# Patient Record
Sex: MALE | Race: WHITE | NOT HISPANIC OR LATINO | URBAN - METROPOLITAN AREA
[De-identification: names, ages, dates, MRNs, and addresses within clinical notes are randomized per-mention and may not be internally consistent; named-entity substitution may affect disease eponyms.]

---

## 2017-03-10 ENCOUNTER — INPATIENT (INPATIENT)
Facility: HOSPITAL | Age: 57
LOS: 2 days | Discharge: HOME | DRG: 638 | End: 2017-03-13
Attending: INTERNAL MEDICINE | Admitting: INTERNAL MEDICINE
Payer: COMMERCIAL

## 2017-03-10 VITALS — WEIGHT: 300.05 LBS | OXYGEN SATURATION: 97 % | HEART RATE: 66 BPM | HEIGHT: 69 IN

## 2017-03-10 DIAGNOSIS — E16.2 HYPOGLYCEMIA, UNSPECIFIED: ICD-10-CM

## 2017-03-10 DIAGNOSIS — R07.9 CHEST PAIN, UNSPECIFIED: ICD-10-CM

## 2017-03-10 DIAGNOSIS — I25.10 ATHEROSCLEROTIC HEART DISEASE OF NATIVE CORONARY ARTERY WITHOUT ANGINA PECTORIS: ICD-10-CM

## 2017-03-10 DIAGNOSIS — Z29.9 ENCOUNTER FOR PROPHYLACTIC MEASURES, UNSPECIFIED: ICD-10-CM

## 2017-03-10 LAB
ACETONE SERPL-MCNC: NEGATIVE — SIGNIFICANT CHANGE UP
ALBUMIN SERPL ELPH-MCNC: 3.2 G/DL — LOW (ref 3.5–5)
ALP SERPL-CCNC: 84 U/L — SIGNIFICANT CHANGE UP (ref 40–120)
ALT FLD-CCNC: 28 U/L DA — SIGNIFICANT CHANGE UP (ref 10–60)
ANION GAP SERPL CALC-SCNC: 9 MMOL/L — SIGNIFICANT CHANGE UP (ref 5–17)
APPEARANCE UR: CLEAR — SIGNIFICANT CHANGE UP
APTT BLD: 30.2 SEC — SIGNIFICANT CHANGE UP (ref 27.5–37.4)
AST SERPL-CCNC: 13 U/L — SIGNIFICANT CHANGE UP (ref 10–40)
BACTERIA # UR AUTO: ABNORMAL /HPF
BASOPHILS # BLD AUTO: 0.1 K/UL — SIGNIFICANT CHANGE UP (ref 0–0.2)
BASOPHILS NFR BLD AUTO: 0.8 % — SIGNIFICANT CHANGE UP (ref 0–2)
BILIRUB SERPL-MCNC: 0.3 MG/DL — SIGNIFICANT CHANGE UP (ref 0.2–1.2)
BILIRUB UR-MCNC: NEGATIVE — SIGNIFICANT CHANGE UP
BUN SERPL-MCNC: 10 MG/DL — SIGNIFICANT CHANGE UP (ref 7–18)
CALCIUM SERPL-MCNC: 8.3 MG/DL — LOW (ref 8.4–10.5)
CHLORIDE SERPL-SCNC: 115 MMOL/L — HIGH (ref 96–108)
CHOLEST SERPL-MCNC: 129 MG/DL — SIGNIFICANT CHANGE UP (ref 10–199)
CK MB BLD-MCNC: <2 % — SIGNIFICANT CHANGE UP (ref 0–3.5)
CK MB BLD-MCNC: <2.1 % — SIGNIFICANT CHANGE UP (ref 0–3.5)
CK MB CFR SERPL CALC: <1 NG/ML — SIGNIFICANT CHANGE UP (ref 0–3.6)
CK MB CFR SERPL CALC: <1 NG/ML — SIGNIFICANT CHANGE UP (ref 0–3.6)
CK SERPL-CCNC: 44 U/L — SIGNIFICANT CHANGE UP (ref 35–232)
CK SERPL-CCNC: 47 U/L — SIGNIFICANT CHANGE UP (ref 35–232)
CK SERPL-CCNC: 51 U/L — SIGNIFICANT CHANGE UP (ref 35–232)
CO2 SERPL-SCNC: 21 MMOL/L — LOW (ref 22–31)
COLOR SPEC: YELLOW — SIGNIFICANT CHANGE UP
CREAT SERPL-MCNC: 1.15 MG/DL — SIGNIFICANT CHANGE UP (ref 0.5–1.3)
DIFF PNL FLD: NEGATIVE — SIGNIFICANT CHANGE UP
EOSINOPHIL # BLD AUTO: 0 K/UL — SIGNIFICANT CHANGE UP (ref 0–0.5)
EOSINOPHIL NFR BLD AUTO: 0.2 % — SIGNIFICANT CHANGE UP (ref 0–6)
EPI CELLS # UR: ABNORMAL (ref 0–10)
GLUCOSE SERPL-MCNC: 70 MG/DL — SIGNIFICANT CHANGE UP (ref 70–99)
GLUCOSE UR QL: 1000 MG/DL
HBA1C BLD-MCNC: 7.7 % — HIGH (ref 4–5.6)
HCT VFR BLD CALC: 48.2 % — SIGNIFICANT CHANGE UP (ref 39–50)
HDLC SERPL-MCNC: 30 MG/DL — LOW (ref 40–125)
HGB BLD-MCNC: 15.7 G/DL — SIGNIFICANT CHANGE UP (ref 13–17)
INR BLD: 1.05 RATIO — SIGNIFICANT CHANGE UP (ref 0.88–1.16)
KETONES UR-MCNC: NEGATIVE — SIGNIFICANT CHANGE UP
LACTATE SERPL-SCNC: 1.4 MMOL/L — SIGNIFICANT CHANGE UP (ref 0.7–2)
LEUKOCYTE ESTERASE UR-ACNC: NEGATIVE — SIGNIFICANT CHANGE UP
LIPID PNL WITH DIRECT LDL SERPL: 66 MG/DL — SIGNIFICANT CHANGE UP
LYMPHOCYTES # BLD AUTO: 0.6 K/UL — LOW (ref 1–3.3)
LYMPHOCYTES # BLD AUTO: 5.1 % — LOW (ref 13–44)
MAGNESIUM SERPL-MCNC: 2.3 MG/DL — SIGNIFICANT CHANGE UP (ref 1.8–2.4)
MCHC RBC-ENTMCNC: 29.9 PG — SIGNIFICANT CHANGE UP (ref 27–34)
MCHC RBC-ENTMCNC: 32.6 GM/DL — SIGNIFICANT CHANGE UP (ref 32–36)
MCV RBC AUTO: 91.8 FL — SIGNIFICANT CHANGE UP (ref 80–100)
MONOCYTES # BLD AUTO: 1 K/UL — HIGH (ref 0–0.9)
MONOCYTES NFR BLD AUTO: 7.7 % — SIGNIFICANT CHANGE UP (ref 2–14)
NEUTROPHILS # BLD AUTO: 10.8 K/UL — HIGH (ref 1.8–7.4)
NEUTROPHILS NFR BLD AUTO: 86.2 % — HIGH (ref 43–77)
NITRITE UR-MCNC: NEGATIVE — SIGNIFICANT CHANGE UP
NT-PROBNP SERPL-SCNC: 290 PG/ML — HIGH (ref 0–125)
PH UR: 5 — SIGNIFICANT CHANGE UP (ref 4.8–8)
PLATELET # BLD AUTO: 178 K/UL — SIGNIFICANT CHANGE UP (ref 150–400)
POTASSIUM SERPL-MCNC: 3.5 MMOL/L — SIGNIFICANT CHANGE UP (ref 3.5–5.3)
POTASSIUM SERPL-SCNC: 3.5 MMOL/L — SIGNIFICANT CHANGE UP (ref 3.5–5.3)
PROT SERPL-MCNC: 7.1 G/DL — SIGNIFICANT CHANGE UP (ref 6–8.3)
PROT UR-MCNC: 15
PROTHROM AB SERPL-ACNC: 11.8 SEC — SIGNIFICANT CHANGE UP (ref 10–13.1)
RBC # BLD: 5.25 M/UL — SIGNIFICANT CHANGE UP (ref 4.2–5.8)
RBC # FLD: 13.8 % — SIGNIFICANT CHANGE UP (ref 10.3–14.5)
RBC CASTS # UR COMP ASSIST: SIGNIFICANT CHANGE UP /HPF (ref 0–2)
SODIUM SERPL-SCNC: 145 MMOL/L — SIGNIFICANT CHANGE UP (ref 135–145)
SP GR SPEC: 1.02 — SIGNIFICANT CHANGE UP (ref 1.01–1.02)
TOTAL CHOLESTEROL/HDL RATIO MEASUREMENT: 4.3 RATIO — SIGNIFICANT CHANGE UP (ref 3.4–9.6)
TRIGL SERPL-MCNC: 164 MG/DL — HIGH (ref 10–149)
TROPONIN I SERPL-MCNC: <0.015 NG/ML — SIGNIFICANT CHANGE UP (ref 0–0.04)
UROBILINOGEN FLD QL: NEGATIVE — SIGNIFICANT CHANGE UP
WBC # BLD: 12.5 K/UL — HIGH (ref 3.8–10.5)
WBC # FLD AUTO: 12.5 K/UL — HIGH (ref 3.8–10.5)
WBC UR QL: SIGNIFICANT CHANGE UP /HPF (ref 0–5)

## 2017-03-10 PROCEDURE — 99285 EMERGENCY DEPT VISIT HI MDM: CPT

## 2017-03-10 PROCEDURE — 71010: CPT | Mod: 26

## 2017-03-10 RX ORDER — TOPIRAMATE 25 MG
50 TABLET ORAL
Qty: 0 | Refills: 0 | Status: DISCONTINUED | OUTPATIENT
Start: 2017-03-10 | End: 2017-03-13

## 2017-03-10 RX ORDER — SODIUM CHLORIDE 9 MG/ML
1000 INJECTION, SOLUTION INTRAVENOUS
Qty: 0 | Refills: 0 | Status: DISCONTINUED | OUTPATIENT
Start: 2017-03-10 | End: 2017-03-10

## 2017-03-10 RX ORDER — ASPIRIN/CALCIUM CARB/MAGNESIUM 324 MG
81 TABLET ORAL DAILY
Qty: 0 | Refills: 0 | Status: DISCONTINUED | OUTPATIENT
Start: 2017-03-10 | End: 2017-03-13

## 2017-03-10 RX ORDER — LOSARTAN POTASSIUM 100 MG/1
25 TABLET, FILM COATED ORAL DAILY
Qty: 0 | Refills: 0 | Status: DISCONTINUED | OUTPATIENT
Start: 2017-03-10 | End: 2017-03-13

## 2017-03-10 RX ORDER — INSULIN LISPRO 100/ML
2 VIAL (ML) SUBCUTANEOUS ONCE
Qty: 0 | Refills: 0 | Status: COMPLETED | OUTPATIENT
Start: 2017-03-10 | End: 2017-03-10

## 2017-03-10 RX ORDER — INSULIN LISPRO 100/ML
VIAL (ML) SUBCUTANEOUS
Qty: 0 | Refills: 0 | Status: DISCONTINUED | OUTPATIENT
Start: 2017-03-10 | End: 2017-03-13

## 2017-03-10 RX ORDER — IPRATROPIUM/ALBUTEROL SULFATE 18-103MCG
3 AEROSOL WITH ADAPTER (GRAM) INHALATION EVERY 6 HOURS
Qty: 0 | Refills: 0 | Status: DISCONTINUED | OUTPATIENT
Start: 2017-03-10 | End: 2017-03-12

## 2017-03-10 RX ORDER — CLOPIDOGREL BISULFATE 75 MG/1
75 TABLET, FILM COATED ORAL EVERY 24 HOURS
Qty: 0 | Refills: 0 | Status: DISCONTINUED | OUTPATIENT
Start: 2017-03-10 | End: 2017-03-13

## 2017-03-10 RX ORDER — DEXTROSE 50 % IN WATER 50 %
25 SYRINGE (ML) INTRAVENOUS ONCE
Qty: 0 | Refills: 0 | Status: DISCONTINUED | OUTPATIENT
Start: 2017-03-10 | End: 2017-03-13

## 2017-03-10 RX ORDER — INFLUENZA VIRUS VACCINE 15; 15; 15; 15 UG/.5ML; UG/.5ML; UG/.5ML; UG/.5ML
0.5 SUSPENSION INTRAMUSCULAR ONCE
Qty: 0 | Refills: 0 | Status: COMPLETED | OUTPATIENT
Start: 2017-03-10 | End: 2017-03-13

## 2017-03-10 RX ORDER — OXCARBAZEPINE 300 MG/1
600 TABLET, FILM COATED ORAL DAILY
Qty: 0 | Refills: 0 | Status: DISCONTINUED | OUTPATIENT
Start: 2017-03-10 | End: 2017-03-13

## 2017-03-10 RX ORDER — DEXTROSE 50 % IN WATER 50 %
50 SYRINGE (ML) INTRAVENOUS ONCE
Qty: 0 | Refills: 0 | Status: COMPLETED | OUTPATIENT
Start: 2017-03-10 | End: 2017-03-10

## 2017-03-10 RX ORDER — FUROSEMIDE 40 MG
40 TABLET ORAL DAILY
Qty: 0 | Refills: 0 | Status: DISCONTINUED | OUTPATIENT
Start: 2017-03-10 | End: 2017-03-13

## 2017-03-10 RX ORDER — SPIRONOLACTONE 25 MG/1
25 TABLET, FILM COATED ORAL DAILY
Qty: 0 | Refills: 0 | Status: DISCONTINUED | OUTPATIENT
Start: 2017-03-10 | End: 2017-03-13

## 2017-03-10 RX ORDER — RANOLAZINE 500 MG/1
500 TABLET, FILM COATED, EXTENDED RELEASE ORAL
Qty: 0 | Refills: 0 | Status: DISCONTINUED | OUTPATIENT
Start: 2017-03-10 | End: 2017-03-13

## 2017-03-10 RX ORDER — CEFTRIAXONE 500 MG/1
1 INJECTION, POWDER, FOR SOLUTION INTRAMUSCULAR; INTRAVENOUS ONCE
Qty: 0 | Refills: 0 | Status: COMPLETED | OUTPATIENT
Start: 2017-03-10 | End: 2017-03-10

## 2017-03-10 RX ORDER — DEXTROSE 50 % IN WATER 50 %
100 SYRINGE (ML) INTRAVENOUS ONCE
Qty: 0 | Refills: 0 | Status: COMPLETED | OUTPATIENT
Start: 2017-03-10 | End: 2017-03-10

## 2017-03-10 RX ORDER — AZITHROMYCIN 500 MG/1
500 TABLET, FILM COATED ORAL ONCE
Qty: 0 | Refills: 0 | Status: COMPLETED | OUTPATIENT
Start: 2017-03-10 | End: 2017-03-10

## 2017-03-10 RX ORDER — METOPROLOL TARTRATE 50 MG
25 TABLET ORAL DAILY
Qty: 0 | Refills: 0 | Status: DISCONTINUED | OUTPATIENT
Start: 2017-03-10 | End: 2017-03-13

## 2017-03-10 RX ORDER — ENOXAPARIN SODIUM 100 MG/ML
40 INJECTION SUBCUTANEOUS DAILY
Qty: 0 | Refills: 0 | Status: DISCONTINUED | OUTPATIENT
Start: 2017-03-10 | End: 2017-03-13

## 2017-03-10 RX ORDER — FLUOXETINE HCL 10 MG
40 CAPSULE ORAL DAILY
Qty: 0 | Refills: 0 | Status: DISCONTINUED | OUTPATIENT
Start: 2017-03-10 | End: 2017-03-13

## 2017-03-10 RX ORDER — ATORVASTATIN CALCIUM 80 MG/1
10 TABLET, FILM COATED ORAL AT BEDTIME
Qty: 0 | Refills: 0 | Status: DISCONTINUED | OUTPATIENT
Start: 2017-03-10 | End: 2017-03-13

## 2017-03-10 RX ORDER — SODIUM CHLORIDE 9 MG/ML
1000 INJECTION, SOLUTION INTRAVENOUS
Qty: 0 | Refills: 0 | Status: DISCONTINUED | OUTPATIENT
Start: 2017-03-10 | End: 2017-03-13

## 2017-03-10 RX ADMIN — Medication 100 MILLILITER(S): at 12:12

## 2017-03-10 RX ADMIN — SODIUM CHLORIDE 50 MILLILITER(S): 9 INJECTION, SOLUTION INTRAVENOUS at 22:36

## 2017-03-10 RX ADMIN — Medication 50 MILLILITER(S): at 11:00

## 2017-03-10 RX ADMIN — OXCARBAZEPINE 600 MILLIGRAM(S): 300 TABLET, FILM COATED ORAL at 22:49

## 2017-03-10 RX ADMIN — ATORVASTATIN CALCIUM 10 MILLIGRAM(S): 80 TABLET, FILM COATED ORAL at 22:50

## 2017-03-10 RX ADMIN — SODIUM CHLORIDE 30 MILLILITER(S): 9 INJECTION, SOLUTION INTRAVENOUS at 14:03

## 2017-03-10 RX ADMIN — AZITHROMYCIN 250 MILLIGRAM(S): 500 TABLET, FILM COATED ORAL at 14:46

## 2017-03-10 RX ADMIN — Medication 40 MILLIGRAM(S): at 22:48

## 2017-03-10 RX ADMIN — Medication 3 MILLILITER(S): at 22:46

## 2017-03-10 RX ADMIN — CEFTRIAXONE 100 GRAM(S): 500 INJECTION, POWDER, FOR SOLUTION INTRAMUSCULAR; INTRAVENOUS at 14:03

## 2017-03-10 NOTE — ED PROVIDER NOTE - NS ED MD SCRIBE ATTENDING SCRIBE SECTIONS
PHYSICAL EXAM/HISTORY OF PRESENT ILLNESS/PAST MEDICAL/SURGICAL/SOCIAL HISTORY/DISPOSITION/HIV/VITAL SIGNS( Pullset)/REVIEW OF SYSTEMS

## 2017-03-10 NOTE — H&P ADULT. - PROBLEM SELECTOR PLAN 2
Likely stable angina   Continue with aspirin plavix and ranexa   Pt is also on lasix and spironolactone for peripheral edema as per wife , no heart failure   Trend cardiac enzyme  EKG showed no signs of ischemia   Monitor on Tele   Cardio Dr. Aguiar ( Primary cardiologist)

## 2017-03-10 NOTE — H&P ADULT. - PROBLEM SELECTOR PLAN 1
Off insulin pump and oral agent ( invokana and metformin)   Continue with Dextrose 5 @ 50 cc/hr   Fingerstick Q1 hour   If FS drops <70 repeat serum glucose (BMP ) and insulin and c-peptide level (at the time of drop FS)  Endo Dr. Aguiar following  Regular diet with evening snack

## 2017-03-10 NOTE — ED PROVIDER NOTE - CRITICAL CARE PROVIDED
direct patient care (not related to procedure)/interpretation of diagnostic studies/documentation/consultation with other physicians/additional history taking

## 2017-03-10 NOTE — H&P ADULT. - ASSESSMENT
56 M from home lives with wife , PMH of CAD with stent x1( august 2016), HTN, DM , bipolar disorder presented to ED with hypoglycemia. As per patient and wife at bedside patient had episode of low blood sugar , he was brought to [facility] and was discharged on Wednesday, last night Patient was not feeling well , FS was checked and it was in 300's , he went to bed , wife went to wake him up in the morning but he was lethargic and drowsy and FS was checked and it was 20 , Coke was given to patient , repeated 35 , EMS was called , D50 was   given by the EMS , wife also reported shaking and sweaty , patient reported feeling blacked out.  Patient reported being off insulin pump and PO invokana and metformin completely since last admission. He had episode of hypoglycemia 3-4 years ago and since then no further episodes except this last week. Last HbA1c was 7.5 as per wife.  In ED patient FS found to be 55 D50 was given , repeated in one hour 36, another D50 was  given , repeated 107 . Started on D5 .   Discussed with Endocrinologist Dr. Aguiar will send serum insulin level, c-peptide and sulfonylurea level, if FS drops repeat serum glucose , serum insulin and c-peptide level.

## 2017-03-10 NOTE — ED PROVIDER NOTE - PROGRESS NOTE DETAILS
Pt w/ persistent hypoglycemia. Given total three amps D50, now blood sugar 106. Will discuss w/ Dr. Aguiar, endocrinologist. Will admit pt, etiology for hypoglycemia unknown since pt has not been using insulin for past few days. Pt w/ persistent hypoglycemia. Given total three amps D50, now blood sugar 106. Will discuss w/ Dr. Aguiar, endocrinologist. Will admit pt, etiology for hypoglycemia unknown since pt has not been using insulin for past few days. case d/w Dr. Cote

## 2017-03-10 NOTE — ED PROVIDER NOTE - MEDICAL DECISION MAKING DETAILS
Pt w/ persistent hypoglycemia. Concern for compromised renal function. Will get labs. Also c/o L chest pain. Unlikely cardiac, however, given Hx of CAD w/ stent will get trops and admission.

## 2017-03-10 NOTE — ED ADULT NURSE NOTE - CHIEF COMPLAINT QUOTE
C/O LOW BLD SUGAR IN THE FIELD THIS AM. EMS REPORTS PT F/S IN THE FIELD WAS 20 1 AMP D50 IVP GIVEN; THEN F/S WAS 97 1 AMP D50 GIVEN; F/S AFTER . EMS REPORTS THAT PT C/O CHEST PRESSURE IN THE FIELD  MG PO AND NITRO 1 SL GIVEN

## 2017-03-10 NOTE — H&P ADULT. - ATTENDING COMMENTS
56 M from home lives with wife , PMH of CAD with stent x1( august 2016), HTN, DM , bipolar disorder presented to ED with hypoglycemia. As per patient and wife at bedside patient had episode of low blood sugar , he was brought to [facility] and was discharged on Wednesday, last night Patient was not feeling well , FS was checked and it was in 300's , he went to bed , wife went to wake him up in the morning but he was lethargic and drowsy and FS was checked and it was 20 , Coke was given to patient , repeated 35 , EMS was called , D50 was   given by the EMS , wife also reported shaking and sweaty , patient reported feeling blacked out.  Patient reported being off insulin pump and PO invokana and metformin completely since last admission. He had episode of hypoglycemia 3-4 years ago and since then no further episodes except this last week. Last HbA1c was 7.5 as per wife.  He also c/o persistent left sided chest pain , non exertional , no radiation with occasional SOB.  ROS negative for fever, chills, headache, abdominal pain, constipation , diarrhea, urinary pain or burning.    pt seen in bed, a+o x3, nad, vitals stable except hypoglycemia, physical exam reveals no focal motor deficit, clear lungs, regular s1s2, abd soft nd nt bs+, ext no edema. labs and diagnostic test result reviewed.    assessment   --- chest pain,  r/o acs, hypoglycemia, r/o bacteremia, h/o CAD with stent x1( august 2016), HTN, DM , bipolar disorder, alejandra     plan  --  adm to tele, acs protocol, lopressor, aspirin, statin, cont preadmit home meds, gi and dvt profilaxis  cbc, bmp, mg, phos, lipid, tsh, ce q8 x3    echo    cardio cons  endo cons  pulm cons 56 M from home lives with wife , PMH of CAD with stent x1( august 2016), HTN, DM , bipolar disorder presented to ED with hypoglycemia. As per patient and wife at bedside patient had episode of low blood sugar , he was brought to [facility] and was discharged on Wednesday, last night Patient was not feeling well , FS was checked and it was in 300's , he went to bed , wife went to wake him up in the morning but he was lethargic and drowsy and FS was checked and it was 20 , Coke was given to patient , repeated 35 , EMS was called , D50 was   given by the EMS , wife also reported shaking and sweaty , patient reported feeling blacked out.  Patient reported being off insulin pump and PO invokana and metformin completely since last admission. He had episode of hypoglycemia 3-4 years ago and since then no further episodes except this last week. Last HbA1c was 7.5 as per wife.  He also c/o persistent left sided chest pain , non exertional , no radiation with occasional SOB.  ROS negative for fever, chills, headache, abdominal pain, constipation , diarrhea, urinary pain or burning.    pt seen in bed, a+o x3, nad, vitals stable except hypoglycemia, physical exam reveals no focal motor deficit, clear lungs, regular s1s2, abd soft nd nt bs+, ext no edema. labs and diagnostic test result reviewed.    assessment   --- chest pain,  r/o acs, hypoglycemia, r/o bacteremia, h/o CAD with stent x1( august 2016), HTN, DM , bipolar disorder, alejandra     plan  --  adm to tele, acs protocol, lopressor, aspirin, statin, cont preadmit home meds, gi and dvt profilaxis  cbc, bmp, mg, phos, lipid, tsh, ce q8 x3, insulin lev, cpeptide, sulfonylureas  lev    echo    cardio cons  endo cons  pulm cons

## 2017-03-10 NOTE — ED PROVIDER NOTE - PMH
Abdominal Obesity    Arthritis    CAD (coronary artery disease)    Congestive heart failure    Diabetes Mellitus Type 2 in Obese    JONAH treated with BiPAP    Smoker

## 2017-03-10 NOTE — ED ADULT TRIAGE NOTE - CHIEF COMPLAINT QUOTE
C/O LOW BLD SUGAR IN THE FIELD THIS AM. EMS REPORTS PT F/S IN THE FIELD WAS 20 1 AMP D50 IVP GIVEN; THEN F/F WAS 97 1 AMP D50 GIVEN; F/S AFTER . EMS REPORTS THAT PT C/O CHEST PRESSURE IN THE FIELD  MG PO AND NITRO 1 SL GIVEN C/O LOW BLD SUGAR IN THE FIELD THIS AM. EMS REPORTS PT F/S IN THE FIELD WAS 20 1 AMP D50 IVP GIVEN; THEN F/S WAS 97 1 AMP D50 GIVEN; F/S AFTER . EMS REPORTS THAT PT C/O CHEST PRESSURE IN THE FIELD  MG PO AND NITRO 1 SL GIVEN

## 2017-03-10 NOTE — H&P ADULT. - HISTORY OF PRESENT ILLNESS
56 M from home lives with wife , PMH of CAD with stent x1( august 2016), HTN, DM , bipolar disorder presented to ED with hypoglycemia. As per patient and wife at bedside patient had episode of low blood sugar , he was brought to [facility] and was discharged on Wednesday, last night Patient was not feeling well , FS was checked and it was in 300's , he went to bed , wife went to wake him up in the morning but he was lethargic and drowsy and FS was checked and it was 20 , Coke was given to patient , repeated 35 , EMS was called , D50 was   given by the EMS , wife also reported shaking and sweaty , patient reported feeling blacked out.  He also c/o persistent left sided chest pain , non exertional , no radiation with occasional SOB. Patient reported being off insulin pump and PO invokana and metformin completely since last admission. He had episode of hypoglycemia 3-4 years ago and since then no further episodes except this last week. 56 M from home lives with wife , PMH of CAD with stent x1( august 2016), HTN, DM , bipolar disorder presented to ED with hypoglycemia. As per patient and wife at bedside patient had episode of low blood sugar , he was brought to [facility] and was discharged on Wednesday, last night Patient was not feeling well , FS was checked and it was in 300's , he went to bed , wife went to wake him up in the morning but he was lethargic and drowsy and FS was checked and it was 20 , Coke was given to patient , repeated 35 , EMS was called , D50 was   given by the EMS , wife also reported shaking and sweaty , patient reported feeling blacked out.  Patient reported being off insulin pump and PO invokana and metformin completely since last admission. He had episode of hypoglycemia 3-4 years ago and since then no further episodes except this last week. Last HbA1c was 7.5 as per wife.  He also c/o persistent left sided chest pain , non exertional , no radiation with occasional SOB.  ROS negative for fever, chills, headache, abdominal pain, constipation , diarrhea, urinary pain or burning.

## 2017-03-10 NOTE — ED PROVIDER NOTE - OBJECTIVE STATEMENT
56 M w/ PMHx of arthritis, bipolar disorder, CAD, DM, on insulin last dosage 5 days ago, and JONAH BIBA p/w hypoglycemia onset today. As per wife, pt's sugar 20 at home and EMS provided 1 amp D 50 and blood sugar went to 97. After pt was given nitroglycerin for L chest pain and 162 ASA but complains no relief.  Wife reports pt has been off insulin for 5 days after being in hospital. Wife states pt went to [facility] 5 days ago for low sugar and was in hospital and was d/c 2 days ago w/ decreased appetite. Pt also notes R sided chest pain described as tightness x 2 weeks. Wife denies vomiting, coughing, shortness of breath, urinary Sx, or any other complaint. PMD: Dr. Coto; Cardiologist: Dr. Aguiar. Pt is a former smoker and quit this month. 56 M w/ PMHx of arthritis, bipolar disorder, CAD, DM, on insulin last dosage 5 days ago, and JONAH BIBA p/w hypoglycemia onset today. As per wife, pt's sugar 20 at home and EMS provided 1 amp D 50 and blood sugar went to 97. After pt was given nitroglycerin for L chest pain and 162 ASA but complains no relief.  Wife reports pt has been off insulin for 5 days after being in hospital. Wife states pt went to [facility] 5 days ago for low sugar and was in hospital and was d/c 2 days ago w/ decreased appetite. Pt also notes R sided chest pain described as tightness x 2 weeks. Wife denies vomiting, coughing, shortness of breath, urinary Sx, or any other complaint. PMD: Dr. Coto; Endocrinologist: Dr. Aguiar, affiliated w/ [place]. Pt is a former smoker for 40 years and quit this month. 56 M w/ PMHx of arthritis, bipolar disorder, CAD, DM, on insulin last dosage 5 days ago, and JONAH BIBA p/w hypoglycemia onset today. As per wife, pt's sugar 20 at home, given coca cola and EMS provided 1 amp D 50 and blood sugar went to 97. After pt was given nitroglycerin for L chest pain and 162 ASA but complains no relief.  Wife reports pt has been off insulin for 5 days after being in hospital. Wife states pt went to [facility] 5 days ago for low sugar and was in hospital and was d/c 2 days ago w/ decreased appetite. Pt also notes R sided chest pain described as tightness x 2 weeks. Wife denies vomiting, coughing, shortness of breath, urinary Sx, or any other complaint. PMD: Dr. Coto; Endocrinologist: Dr. Aguiar, affiliated w/ [place]. Pt is a former smoker for 40 years and quit this month.

## 2017-03-10 NOTE — ED ADULT NURSE REASSESSMENT NOTE - NS ED NURSE REASSESS COMMENT FT1
FS 36 MD butts informed, Med given as ordered, patient alert and responsive. A and O X 3. Family at bedside.

## 2017-03-11 ENCOUNTER — TRANSCRIPTION ENCOUNTER (OUTPATIENT)
Age: 57
End: 2017-03-11

## 2017-03-11 LAB
ANION GAP SERPL CALC-SCNC: 9 MMOL/L — SIGNIFICANT CHANGE UP (ref 5–17)
BUN SERPL-MCNC: 10 MG/DL — SIGNIFICANT CHANGE UP (ref 7–18)
C PEPTIDE SERPL-MCNC: 7.4 NG/ML — HIGH (ref 0.9–7.1)
CALCIUM SERPL-MCNC: 8.2 MG/DL — LOW (ref 8.4–10.5)
CHLORIDE SERPL-SCNC: 108 MMOL/L — SIGNIFICANT CHANGE UP (ref 96–108)
CO2 SERPL-SCNC: 23 MMOL/L — SIGNIFICANT CHANGE UP (ref 22–31)
CREAT SERPL-MCNC: 1.27 MG/DL — SIGNIFICANT CHANGE UP (ref 0.5–1.3)
CULTURE RESULTS: NO GROWTH — SIGNIFICANT CHANGE UP
GLUCOSE SERPL-MCNC: 193 MG/DL — HIGH (ref 70–99)
GRAM STN FLD: SIGNIFICANT CHANGE UP
HCT VFR BLD CALC: 47.1 % — SIGNIFICANT CHANGE UP (ref 39–50)
HGB BLD-MCNC: 14.7 G/DL — SIGNIFICANT CHANGE UP (ref 13–17)
INSULIN SERPL-MCNC: 41 UU/ML — HIGH (ref 3–17)
MAGNESIUM SERPL-MCNC: 2.3 MG/DL — SIGNIFICANT CHANGE UP (ref 1.8–2.4)
MCHC RBC-ENTMCNC: 29.6 PG — SIGNIFICANT CHANGE UP (ref 27–34)
MCHC RBC-ENTMCNC: 31.3 GM/DL — LOW (ref 32–36)
MCV RBC AUTO: 94.4 FL — SIGNIFICANT CHANGE UP (ref 80–100)
PHOSPHATE SERPL-MCNC: 1.7 MG/DL — LOW (ref 2.5–4.5)
PLATELET # BLD AUTO: 166 K/UL — SIGNIFICANT CHANGE UP (ref 150–400)
POTASSIUM SERPL-MCNC: 3.9 MMOL/L — SIGNIFICANT CHANGE UP (ref 3.5–5.3)
POTASSIUM SERPL-SCNC: 3.9 MMOL/L — SIGNIFICANT CHANGE UP (ref 3.5–5.3)
RBC # BLD: 4.99 M/UL — SIGNIFICANT CHANGE UP (ref 4.2–5.8)
RBC # FLD: 13.8 % — SIGNIFICANT CHANGE UP (ref 10.3–14.5)
SODIUM SERPL-SCNC: 140 MMOL/L — SIGNIFICANT CHANGE UP (ref 135–145)
SPECIMEN SOURCE: SIGNIFICANT CHANGE UP
SPECIMEN SOURCE: SIGNIFICANT CHANGE UP
WBC # BLD: 8.8 K/UL — SIGNIFICANT CHANGE UP (ref 3.8–10.5)
WBC # FLD AUTO: 8.8 K/UL — SIGNIFICANT CHANGE UP (ref 3.8–10.5)

## 2017-03-11 RX ORDER — SODIUM,POTASSIUM PHOSPHATES 278-250MG
1 POWDER IN PACKET (EA) ORAL
Qty: 0 | Refills: 0 | Status: COMPLETED | OUTPATIENT
Start: 2017-03-11 | End: 2017-03-12

## 2017-03-11 RX ORDER — DEXTROSE 50 % IN WATER 50 %
50 SYRINGE (ML) INTRAVENOUS EVERY 6 HOURS
Qty: 0 | Refills: 0 | Status: DISCONTINUED | OUTPATIENT
Start: 2017-03-11 | End: 2017-03-13

## 2017-03-11 RX ADMIN — OXCARBAZEPINE 600 MILLIGRAM(S): 300 TABLET, FILM COATED ORAL at 12:44

## 2017-03-11 RX ADMIN — Medication 3 MILLILITER(S): at 02:28

## 2017-03-11 RX ADMIN — Medication 50 MILLIGRAM(S): at 05:56

## 2017-03-11 RX ADMIN — RANOLAZINE 500 MILLIGRAM(S): 500 TABLET, FILM COATED, EXTENDED RELEASE ORAL at 02:04

## 2017-03-11 RX ADMIN — SPIRONOLACTONE 25 MILLIGRAM(S): 25 TABLET, FILM COATED ORAL at 05:56

## 2017-03-11 RX ADMIN — ATORVASTATIN CALCIUM 10 MILLIGRAM(S): 80 TABLET, FILM COATED ORAL at 22:32

## 2017-03-11 RX ADMIN — SODIUM CHLORIDE 50 MILLILITER(S): 9 INJECTION, SOLUTION INTRAVENOUS at 05:56

## 2017-03-11 RX ADMIN — RANOLAZINE 500 MILLIGRAM(S): 500 TABLET, FILM COATED, EXTENDED RELEASE ORAL at 18:30

## 2017-03-11 RX ADMIN — Medication 81 MILLIGRAM(S): at 12:11

## 2017-03-11 RX ADMIN — Medication 40 MILLIGRAM(S): at 05:55

## 2017-03-11 RX ADMIN — RANOLAZINE 500 MILLIGRAM(S): 500 TABLET, FILM COATED, EXTENDED RELEASE ORAL at 11:07

## 2017-03-11 RX ADMIN — Medication 1 TABLET(S): at 22:32

## 2017-03-11 RX ADMIN — ENOXAPARIN SODIUM 40 MILLIGRAM(S): 100 INJECTION SUBCUTANEOUS at 12:11

## 2017-03-11 RX ADMIN — Medication 25 MILLIGRAM(S): at 05:55

## 2017-03-11 RX ADMIN — Medication 1 TABLET(S): at 12:44

## 2017-03-11 RX ADMIN — Medication 50 MILLIGRAM(S): at 02:03

## 2017-03-11 RX ADMIN — CLOPIDOGREL BISULFATE 75 MILLIGRAM(S): 75 TABLET, FILM COATED ORAL at 18:30

## 2017-03-11 RX ADMIN — Medication 3 MILLILITER(S): at 20:11

## 2017-03-11 RX ADMIN — Medication 1 TABLET(S): at 18:30

## 2017-03-11 RX ADMIN — Medication 50 MILLIGRAM(S): at 18:30

## 2017-03-11 RX ADMIN — Medication 40 MILLIGRAM(S): at 12:44

## 2017-03-11 RX ADMIN — LOSARTAN POTASSIUM 25 MILLIGRAM(S): 100 TABLET, FILM COATED ORAL at 05:55

## 2017-03-11 RX ADMIN — Medication 3 MILLILITER(S): at 08:13

## 2017-03-11 RX ADMIN — Medication 3 MILLILITER(S): at 14:35

## 2017-03-11 NOTE — DISCHARGE NOTE ADULT - MEDICATION SUMMARY - MEDICATIONS TO STOP TAKING
I will STOP taking the medications listed below when I get home from the hospital:    Invokana 300 mg oral tablet  -- 1 tab(s) by mouth once a day

## 2017-03-11 NOTE — DISCHARGE NOTE ADULT - MEDICATION SUMMARY - MEDICATIONS TO CHANGE
I will SWITCH the dose or number of times a day I take the medications listed below when I get home from the hospital:    metFORMIN 1000 mg oral tablet  -- 1 tab(s) by mouth 2 times a day

## 2017-03-11 NOTE — DISCHARGE NOTE ADULT - NS AS DC HF EDUCATION INSTRUCTIONS
Low salt diet/Report weight gain of 2 or more pounds in one day or 3 or more pounds in one week, worsening shortness of breath, fatigue, weakness, increased swelling of hands and feet to primary care provider/Call Primary Care Provider for follow-up after discharge/Activities as tolerated/Monitor Weight Daily

## 2017-03-11 NOTE — DISCHARGE NOTE ADULT - PLAN OF CARE
Stop all kind of insulin and continue metformin. Follow up with primary cardiologist and endocrinologist within week of discharge. Continue Aspirin, plavix and all the cardiac medication. Follow up with cardiologist within a week of discharge. We recommend repeat cardiac cath but patient refused to be transferred. We recommend to follow up with cardiologist and consider cardiac cath for possible unstable angina. Exercise 3-5 times a week 30 minutes a day. Weight loss will help control sugars and BP.

## 2017-03-11 NOTE — DISCHARGE NOTE ADULT - CARE PLAN
Principal Discharge DX:	Hypoglycemia  Goal:	Stop all kind of insulin and continue metformin.  Instructions for follow-up, activity and diet:	Follow up with primary cardiologist and endocrinologist within week of discharge.  Secondary Diagnosis:	CAD (coronary artery disease)  Instructions for follow-up, activity and diet:	Continue Aspirin, plavix and all the cardiac medication. Follow up with cardiologist within a week of discharge. We recommend repeat cardiac cath but patient refused to be transferred. We recommend to follow up with cardiologist and consider cardiac cath for possible unstable angina.  Secondary Diagnosis:	Abdominal obesity  Instructions for follow-up, activity and diet:	Exercise 3-5 times a week 30 minutes a day. Weight loss will help control sugars and BP.

## 2017-03-11 NOTE — DISCHARGE NOTE ADULT - MEDICATION SUMMARY - MEDICATIONS TO TAKE
I will START or STAY ON the medications listed below when I get home from the hospital:    spironolactone 25 mg oral tablet  -- 1 tab(s) by mouth once a day  -- Indication: For Htn    Aspirin Enteric Coated 81 mg oral delayed release tablet  -- 1 tab(s) by mouth once a day  -- Indication: For Ascvd prevention    Cozaar 25 mg oral tablet  -- 1 tab(s) by mouth once a day  -- Indication: For Htn    Ranexa 500 mg oral tablet, extended release  -- 1 tab(s) by mouth 2 times a day  -- Indication: For Anti angina    isosorbide mononitrate 30 mg oral tablet, extended release  -- 1 tab(s) by mouth once a day  -- Indication: For Anti anginal    Topamax 50 mg oral tablet  -- 1 tab(s) by mouth 2 times a day  -- Indication: For Anti conculsant    Trileptal 600 mg oral tablet  -- 1 tab(s) by mouth once a day  -- Indication: For ANticonvulsant    PROzac 40 mg oral capsule  -- 1 cap(s) by mouth once a day  -- Indication: For Anti depressant    Lipitor 10 mg oral tablet  -- 1 tab(s) by mouth once a day  -- Indication: For HLD    clopidogrel 75 mg oral tablet  -- 1 tab(s) by mouth every 24 hours  -- Indication: For CAD (coronary artery disease)    Lopressor  -- 25 milligram(s) by mouth once a day  -- Indication: For Htn    albuterol 90 mcg/inh inhalation powder  -- 2 puff(s) inhaled every 4 hours  -- Indication: For reactive airway disease    Lasix 40 mg oral tablet  -- 1 tab(s) by mouth once a day  -- Indication: For Peripheral swelling I will START or STAY ON the medications listed below when I get home from the hospital:    spironolactone 25 mg oral tablet  -- 1 tab(s) by mouth once a day  -- Indication: For Htn    Aspirin Enteric Coated 81 mg oral delayed release tablet  -- 1 tab(s) by mouth once a day  -- Indication: For Ascvd prevention    Cozaar 25 mg oral tablet  -- 1 tab(s) by mouth once a day  -- Indication: For Htn    Ranexa 500 mg oral tablet, extended release  -- 1 tab(s) by mouth 2 times a day  -- Indication: For Anti angina    isosorbide mononitrate 30 mg oral tablet, extended release  -- 1 tab(s) by mouth once a day  -- Indication: For Anti anginal    Topamax 50 mg oral tablet  -- 1 tab(s) by mouth 2 times a day  -- Indication: For Anti conculsant    Trileptal 600 mg oral tablet  -- 1 tab(s) by mouth once a day  -- Indication: For ANticonvulsant    PROzac 40 mg oral capsule  -- 1 cap(s) by mouth once a day  -- Indication: For Anti depressant    metFORMIN 500 mg oral tablet  -- 1 tab(s) by mouth 2 times a day  -- Check with your doctor before becoming pregnant.  Do not drink alcoholic beverages when taking this medication.  It is very important that you take or use this exactly as directed.  Do not skip doses or discontinue unless directed by your doctor.  Obtain medical advice before taking any non-prescription drugs as some may affect the action of this medication.  Take with food or milk.    -- Indication: For Diabetes mellitus    Lipitor 10 mg oral tablet  -- 1 tab(s) by mouth once a day  -- Indication: For HLD    clopidogrel 75 mg oral tablet  -- 1 tab(s) by mouth every 24 hours  -- Indication: For CAD (coronary artery disease)    Lopressor  -- 25 milligram(s) by mouth once a day  -- Indication: For Htn    albuterol 90 mcg/inh inhalation powder  -- 2 puff(s) inhaled every 4 hours  -- Indication: For reactive airway disease    Lasix 40 mg oral tablet  -- 1 tab(s) by mouth once a day  -- Indication: For Peripheral swelling

## 2017-03-11 NOTE — DISCHARGE NOTE ADULT - NS AS DC STROKE ED MATERIALS
Risk Factors for Stroke/Prescribed Medications/Need for Followup After Discharge/Stroke Education Booklet/Call 911 for Stroke/Stroke Warning Signs and Symptoms

## 2017-03-11 NOTE — DISCHARGE NOTE ADULT - NS AS DC FOLLOWUP STROKE INST
Heart Failure Heart Failure/Stroke (includes: TIA/SAH/ICH/Ischemic Stroke) Heart Failure/Influenza vaccination (VIS Pub Date: August 19, 2014)

## 2017-03-11 NOTE — DISCHARGE NOTE ADULT - HOSPITAL COURSE
56 M from home lives with wife , PMH of CAD with stent x1( august 2016), HTN, DM , bipolar disorder presented to ED with hypoglycemia. As per patient and wife at bedside patient had episode of low blood sugar , he was brought to [facility] and was discharged on Wednesday, last night Patient was not feeling well , FS was checked and it was in 300's , he went to bed , wife went to wake him up in the morning but he was lethargic and drowsy and FS was checked and it was 20 , Coke was given to patient , repeated 35 , EMS was called , D50 was   given by the EMS , wife also reported shaking and sweaty , patient reported feeling blacked out.  Patient reported being off insulin pump and PO invokana and metformin completely since last admission. He had episode of hypoglycemia 3-4 years ago and since then no further episodes except this last week. Last HbA1c was 7.5 as per wife.  In ED patient FS found to be 55 D50 was given , repeated in one hour 36, another D50 was  given , repeated 107 . Started on D5 .   Discussed with Endocrinologist Dr. Aguiar will send serum insulin level, c-peptide and sulfonylurea level, if FS drops repeat serum glucose , serum insulin and c-peptide level.     Patient was admitted for Hypoglycemia.  patient is off insulin pump and oral agent ( invokana and metformin)   We started Dextrose 5 @ 50 cc/hr because of low sugars. Fingerstick was checked  Q1 hour. Endo Dr. Aguiar was consulted and Regular diet with evening snack was started. .     Problem/Plan - 2:  ·  Problem: Chest pain.  Plan: Likely stable angina   Continue with aspirin plavix and ranexa   Pt is also on lasix and spironolactone for peripheral edema as per wife , no heart failure   Trend cardiac enzyme  EKG showed no signs of ischemia   Monitor on Tele   Cardio Dr. Aguiar ( Primary cardiologist).         Continue Aspirin, plavix and all the cardiac medication. Follow up with cardiologist within a week of discharge. We recommend repeat cardiac cath but patient refused to be transferred. We recommend to follow up with cardiologist and consider cardiac cath for possible unstable angina. 56 M from home lives with wife , PMH of CAD with stent x1( august 2016), HTN, DM , bipolar disorder presented to ED with hypoglycemia. As per patient and wife at bedside patient had episode of low blood sugar , he was brought to [facility] and was discharged on Wednesday, last night Patient was not feeling well , FS was checked and it was in 300's , he went to bed , wife went to wake him up in the morning but he was lethargic and drowsy and FS was checked and it was 20 , Coke was given to patient , repeated 35 , EMS was called , D50 was   given by the EMS , wife also reported shaking and sweaty , patient reported feeling blacked out.  Patient reported being off insulin pump and PO invokana and metformin completely since last admission. He had episode of hypoglycemia 3-4 years ago and since then no further episodes except this last week. Last HbA1c was 7.5 as per wife.    In ED patient FS found to be 55 D50 was given , repeated in one hour 36, another D50 was  given , repeated 107 . Started on D5 .   Discussed with Endocrinologist Dr. Guevara will send serum insulin level, c-peptide and sulfonylurea level, if FS drops repeat serum glucose , serum insulin and c-peptide level.     Patient was admitted for Hypoglycemia.  patient is off insulin pump and oral agent ( invokana and metformin)   We started Dextrose 5 @ 50 cc/hr because of low sugars. Fingerstick was checked  Q1 hour. Endo Dr. Guevara was consulted and Regular diet with evening snack was started. Patient did not had any episode of hypoglycemia during the hospitalization. Dr guevara recommended to discontinue all insulin on discharge and continue metformin 500 twice a day    Patient had intermittent Chest pain likely unstable angina. Continue with aspirin plavix and ranexa. Pt is also on lasix and spironolactone for peripheral edema as per wife , no history of heart failure. cardiac enzyme remianed -ve. EKG showed no signs of ischemia. Patient was   Monitored on Tele but no acute arrythmia was found.  Cardio Dr. Guevara ( Primary cardiologist) was coinsutld and we offered the patient to have cardiac cath but refused to go to [place] for cath and wants to get discharged.     We recommend patient to continue Aspirin, plavix and all the cardiac medication. Follow up with cardiologist within a week of discharge. We recommend repeat cardiac cath but patient refused to be transferred. We recommend to follow up with cardiologist and consider cardiac cath for possible unstable angina.

## 2017-03-11 NOTE — DISCHARGE NOTE ADULT - PATIENT PORTAL LINK FT
“You can access the [facility] Patient Portal, offered by [facility], by registering with the following website: [email or web address]”

## 2017-03-12 LAB
ANION GAP SERPL CALC-SCNC: 12 MMOL/L — SIGNIFICANT CHANGE UP (ref 5–17)
BUN SERPL-MCNC: 13 MG/DL — SIGNIFICANT CHANGE UP (ref 7–18)
CALCIUM SERPL-MCNC: 8.5 MG/DL — SIGNIFICANT CHANGE UP (ref 8.4–10.5)
CHLORIDE SERPL-SCNC: 111 MMOL/L — HIGH (ref 96–108)
CO2 SERPL-SCNC: 21 MMOL/L — LOW (ref 22–31)
CREAT SERPL-MCNC: 1.25 MG/DL — SIGNIFICANT CHANGE UP (ref 0.5–1.3)
GLUCOSE SERPL-MCNC: 186 MG/DL — HIGH (ref 70–99)
HCT VFR BLD CALC: 45.9 % — SIGNIFICANT CHANGE UP (ref 39–50)
HGB BLD-MCNC: 15 G/DL — SIGNIFICANT CHANGE UP (ref 13–17)
MAGNESIUM SERPL-MCNC: 2.4 MG/DL — SIGNIFICANT CHANGE UP (ref 1.8–2.4)
MCHC RBC-ENTMCNC: 30.5 PG — SIGNIFICANT CHANGE UP (ref 27–34)
MCHC RBC-ENTMCNC: 32.6 GM/DL — SIGNIFICANT CHANGE UP (ref 32–36)
MCV RBC AUTO: 93.6 FL — SIGNIFICANT CHANGE UP (ref 80–100)
PHOSPHATE SERPL-MCNC: 2.4 MG/DL — LOW (ref 2.5–4.5)
PLATELET # BLD AUTO: 179 K/UL — SIGNIFICANT CHANGE UP (ref 150–400)
POTASSIUM SERPL-MCNC: 4 MMOL/L — SIGNIFICANT CHANGE UP (ref 3.5–5.3)
POTASSIUM SERPL-SCNC: 4 MMOL/L — SIGNIFICANT CHANGE UP (ref 3.5–5.3)
RBC # BLD: 4.9 M/UL — SIGNIFICANT CHANGE UP (ref 4.2–5.8)
RBC # FLD: 13.9 % — SIGNIFICANT CHANGE UP (ref 10.3–14.5)
SODIUM SERPL-SCNC: 144 MMOL/L — SIGNIFICANT CHANGE UP (ref 135–145)
WBC # BLD: 9.1 K/UL — SIGNIFICANT CHANGE UP (ref 3.8–10.5)
WBC # FLD AUTO: 9.1 K/UL — SIGNIFICANT CHANGE UP (ref 3.8–10.5)

## 2017-03-12 RX ORDER — TIOTROPIUM BROMIDE 18 UG/1
1 CAPSULE ORAL; RESPIRATORY (INHALATION) DAILY
Qty: 0 | Refills: 0 | Status: DISCONTINUED | OUTPATIENT
Start: 2017-03-12 | End: 2017-03-13

## 2017-03-12 RX ORDER — ALBUTEROL 90 UG/1
1 AEROSOL, METERED ORAL EVERY 4 HOURS
Qty: 0 | Refills: 0 | Status: DISCONTINUED | OUTPATIENT
Start: 2017-03-12 | End: 2017-03-13

## 2017-03-12 RX ORDER — ALBUTEROL 90 UG/1
1 AEROSOL, METERED ORAL EVERY 4 HOURS
Qty: 0 | Refills: 0 | Status: COMPLETED | OUTPATIENT
Start: 2017-03-12 | End: 2018-02-08

## 2017-03-12 RX ADMIN — Medication 1 TABLET(S): at 07:39

## 2017-03-12 RX ADMIN — Medication 81 MILLIGRAM(S): at 11:50

## 2017-03-12 RX ADMIN — RANOLAZINE 500 MILLIGRAM(S): 500 TABLET, FILM COATED, EXTENDED RELEASE ORAL at 07:38

## 2017-03-12 RX ADMIN — OXCARBAZEPINE 600 MILLIGRAM(S): 300 TABLET, FILM COATED ORAL at 11:50

## 2017-03-12 RX ADMIN — Medication 50 MILLIGRAM(S): at 22:20

## 2017-03-12 RX ADMIN — Medication 3 MILLILITER(S): at 08:02

## 2017-03-12 RX ADMIN — RANOLAZINE 500 MILLIGRAM(S): 500 TABLET, FILM COATED, EXTENDED RELEASE ORAL at 22:21

## 2017-03-12 RX ADMIN — ALBUTEROL 1 PUFF(S): 90 AEROSOL, METERED ORAL at 22:21

## 2017-03-12 RX ADMIN — LOSARTAN POTASSIUM 25 MILLIGRAM(S): 100 TABLET, FILM COATED ORAL at 07:38

## 2017-03-12 RX ADMIN — Medication 25 MILLIGRAM(S): at 07:38

## 2017-03-12 RX ADMIN — Medication 40 MILLIGRAM(S): at 11:50

## 2017-03-12 RX ADMIN — Medication 3 MILLILITER(S): at 14:10

## 2017-03-12 RX ADMIN — Medication 50 MILLIGRAM(S): at 07:38

## 2017-03-12 RX ADMIN — Medication 40 MILLIGRAM(S): at 07:38

## 2017-03-12 RX ADMIN — ATORVASTATIN CALCIUM 10 MILLIGRAM(S): 80 TABLET, FILM COATED ORAL at 22:21

## 2017-03-12 RX ADMIN — ENOXAPARIN SODIUM 40 MILLIGRAM(S): 100 INJECTION SUBCUTANEOUS at 11:50

## 2017-03-12 RX ADMIN — CLOPIDOGREL BISULFATE 75 MILLIGRAM(S): 75 TABLET, FILM COATED ORAL at 17:41

## 2017-03-12 RX ADMIN — Medication 3: at 11:51

## 2017-03-12 RX ADMIN — SPIRONOLACTONE 25 MILLIGRAM(S): 25 TABLET, FILM COATED ORAL at 07:38

## 2017-03-12 RX ADMIN — Medication 3 MILLILITER(S): at 21:09

## 2017-03-12 NOTE — DIETITIAN INITIAL EVALUATION ADULT. - OTHER INFO
Pt visited.Reports H/O DM x 7 yrs. Pt  was on insulin pump and On Oral hypoglycemic agent at home.Report having good appetite  and   consuming bed time   snack. Pt is Compliance to the diet .And Medications. Per pt he does not have insulin pump at present pt has Removed it at home..No chewing or swallowing difficulty r Reported.NKFA .

## 2017-03-13 VITALS
TEMPERATURE: 99 F | HEART RATE: 71 BPM | RESPIRATION RATE: 16 BRPM | OXYGEN SATURATION: 93 % | SYSTOLIC BLOOD PRESSURE: 127 MMHG | DIASTOLIC BLOOD PRESSURE: 73 MMHG

## 2017-03-13 LAB
-  CLINDAMYCIN: SIGNIFICANT CHANGE UP
-  PENICILLIN: SIGNIFICANT CHANGE UP
CULTURE RESULTS: SIGNIFICANT CHANGE UP
METHOD TYPE: SIGNIFICANT CHANGE UP
ORGANISM # SPEC MICROSCOPIC CNT: SIGNIFICANT CHANGE UP
ORGANISM # SPEC MICROSCOPIC CNT: SIGNIFICANT CHANGE UP
SPECIMEN SOURCE: SIGNIFICANT CHANGE UP

## 2017-03-13 PROCEDURE — 87184 SC STD DISK METHOD PER PLATE: CPT

## 2017-03-13 PROCEDURE — 83525 ASSAY OF INSULIN: CPT

## 2017-03-13 PROCEDURE — 94640 AIRWAY INHALATION TREATMENT: CPT

## 2017-03-13 PROCEDURE — 93005 ELECTROCARDIOGRAM TRACING: CPT

## 2017-03-13 PROCEDURE — 84681 ASSAY OF C-PEPTIDE: CPT

## 2017-03-13 PROCEDURE — 83605 ASSAY OF LACTIC ACID: CPT

## 2017-03-13 PROCEDURE — 85027 COMPLETE CBC AUTOMATED: CPT

## 2017-03-13 PROCEDURE — 83036 HEMOGLOBIN GLYCOSYLATED A1C: CPT

## 2017-03-13 PROCEDURE — 87086 URINE CULTURE/COLONY COUNT: CPT

## 2017-03-13 PROCEDURE — 80053 COMPREHEN METABOLIC PANEL: CPT

## 2017-03-13 PROCEDURE — 81001 URINALYSIS AUTO W/SCOPE: CPT

## 2017-03-13 PROCEDURE — 82550 ASSAY OF CK (CPK): CPT

## 2017-03-13 PROCEDURE — 93306 TTE W/DOPPLER COMPLETE: CPT

## 2017-03-13 PROCEDURE — G0480: CPT

## 2017-03-13 PROCEDURE — 83880 ASSAY OF NATRIURETIC PEPTIDE: CPT

## 2017-03-13 PROCEDURE — 80048 BASIC METABOLIC PNL TOTAL CA: CPT

## 2017-03-13 PROCEDURE — 83735 ASSAY OF MAGNESIUM: CPT

## 2017-03-13 PROCEDURE — 82009 KETONE BODYS QUAL: CPT

## 2017-03-13 PROCEDURE — 87040 BLOOD CULTURE FOR BACTERIA: CPT

## 2017-03-13 PROCEDURE — 84484 ASSAY OF TROPONIN QUANT: CPT

## 2017-03-13 PROCEDURE — 85730 THROMBOPLASTIN TIME PARTIAL: CPT

## 2017-03-13 PROCEDURE — 82553 CREATINE MB FRACTION: CPT

## 2017-03-13 PROCEDURE — 84100 ASSAY OF PHOSPHORUS: CPT

## 2017-03-13 PROCEDURE — 90686 IIV4 VACC NO PRSV 0.5 ML IM: CPT

## 2017-03-13 PROCEDURE — 80061 LIPID PANEL: CPT

## 2017-03-13 PROCEDURE — 85610 PROTHROMBIN TIME: CPT

## 2017-03-13 PROCEDURE — 99285 EMERGENCY DEPT VISIT HI MDM: CPT | Mod: 25

## 2017-03-13 PROCEDURE — 71045 X-RAY EXAM CHEST 1 VIEW: CPT

## 2017-03-13 RX ORDER — METFORMIN HYDROCHLORIDE 850 MG/1
1 TABLET ORAL
Qty: 0 | Refills: 0 | COMMUNITY

## 2017-03-13 RX ORDER — CLOPIDOGREL BISULFATE 75 MG/1
1 TABLET, FILM COATED ORAL
Qty: 0 | Refills: 0 | COMMUNITY
Start: 2017-03-13

## 2017-03-13 RX ORDER — ISOSORBIDE MONONITRATE 60 MG/1
30 TABLET, EXTENDED RELEASE ORAL DAILY
Qty: 0 | Refills: 0 | Status: DISCONTINUED | OUTPATIENT
Start: 2017-03-13 | End: 2017-03-13

## 2017-03-13 RX ORDER — ISOSORBIDE MONONITRATE 60 MG/1
1 TABLET, EXTENDED RELEASE ORAL
Qty: 30 | Refills: 0 | OUTPATIENT
Start: 2017-03-13 | End: 2017-04-12

## 2017-03-13 RX ORDER — METFORMIN HYDROCHLORIDE 850 MG/1
1 TABLET ORAL
Qty: 60 | Refills: 0 | OUTPATIENT
Start: 2017-03-13 | End: 2017-04-12

## 2017-03-13 RX ORDER — CANAGLIFLOZIN 100 MG/1
1 TABLET, FILM COATED ORAL
Qty: 0 | Refills: 0 | COMMUNITY

## 2017-03-13 RX ADMIN — ALBUTEROL 1 PUFF(S): 90 AEROSOL, METERED ORAL at 12:11

## 2017-03-13 RX ADMIN — Medication 50 MILLIGRAM(S): at 06:35

## 2017-03-13 RX ADMIN — TIOTROPIUM BROMIDE 1 CAPSULE(S): 18 CAPSULE ORAL; RESPIRATORY (INHALATION) at 12:12

## 2017-03-13 RX ADMIN — Medication 40 MILLIGRAM(S): at 12:09

## 2017-03-13 RX ADMIN — RANOLAZINE 500 MILLIGRAM(S): 500 TABLET, FILM COATED, EXTENDED RELEASE ORAL at 06:35

## 2017-03-13 RX ADMIN — Medication 40 MILLIGRAM(S): at 06:35

## 2017-03-13 RX ADMIN — INFLUENZA VIRUS VACCINE 0.5 MILLILITER(S): 15; 15; 15; 15 SUSPENSION INTRAMUSCULAR at 08:14

## 2017-03-13 RX ADMIN — SPIRONOLACTONE 25 MILLIGRAM(S): 25 TABLET, FILM COATED ORAL at 06:35

## 2017-03-13 RX ADMIN — ISOSORBIDE MONONITRATE 30 MILLIGRAM(S): 60 TABLET, EXTENDED RELEASE ORAL at 12:12

## 2017-03-13 RX ADMIN — ENOXAPARIN SODIUM 40 MILLIGRAM(S): 100 INJECTION SUBCUTANEOUS at 12:12

## 2017-03-13 RX ADMIN — Medication 81 MILLIGRAM(S): at 12:09

## 2017-03-13 RX ADMIN — LOSARTAN POTASSIUM 25 MILLIGRAM(S): 100 TABLET, FILM COATED ORAL at 06:35

## 2017-03-13 RX ADMIN — Medication 3: at 12:09

## 2017-03-13 RX ADMIN — Medication 25 MILLIGRAM(S): at 06:35

## 2017-03-13 RX ADMIN — OXCARBAZEPINE 600 MILLIGRAM(S): 300 TABLET, FILM COATED ORAL at 12:09

## 2017-03-13 RX ADMIN — ALBUTEROL 1 PUFF(S): 90 AEROSOL, METERED ORAL at 06:39

## 2017-03-15 LAB
CULTURE RESULTS: SIGNIFICANT CHANGE UP
SPECIMEN SOURCE: SIGNIFICANT CHANGE UP

## 2017-03-16 DIAGNOSIS — E66.01 MORBID (SEVERE) OBESITY DUE TO EXCESS CALORIES: ICD-10-CM

## 2017-03-16 DIAGNOSIS — G47.33 OBSTRUCTIVE SLEEP APNEA (ADULT) (PEDIATRIC): ICD-10-CM

## 2017-03-16 DIAGNOSIS — F31.9 BIPOLAR DISORDER, UNSPECIFIED: ICD-10-CM

## 2017-03-16 DIAGNOSIS — E11.649 TYPE 2 DIABETES MELLITUS WITH HYPOGLYCEMIA WITHOUT COMA: ICD-10-CM

## 2017-03-16 DIAGNOSIS — Z87.891 PERSONAL HISTORY OF NICOTINE DEPENDENCE: ICD-10-CM

## 2017-03-16 DIAGNOSIS — I25.110 ATHEROSCLEROTIC HEART DISEASE OF NATIVE CORONARY ARTERY WITH UNSTABLE ANGINA PECTORIS: ICD-10-CM

## 2017-03-16 DIAGNOSIS — Z23 ENCOUNTER FOR IMMUNIZATION: ICD-10-CM

## 2017-03-16 DIAGNOSIS — E78.5 HYPERLIPIDEMIA, UNSPECIFIED: ICD-10-CM

## 2017-03-16 DIAGNOSIS — Z79.02 LONG TERM (CURRENT) USE OF ANTITHROMBOTICS/ANTIPLATELETS: ICD-10-CM

## 2017-03-16 DIAGNOSIS — Z79.4 LONG TERM (CURRENT) USE OF INSULIN: ICD-10-CM

## 2017-03-16 DIAGNOSIS — Z79.82 LONG TERM (CURRENT) USE OF ASPIRIN: ICD-10-CM

## 2017-03-16 DIAGNOSIS — I10 ESSENTIAL (PRIMARY) HYPERTENSION: ICD-10-CM

## 2017-03-16 DIAGNOSIS — Z79.84 LONG TERM (CURRENT) USE OF ORAL HYPOGLYCEMIC DRUGS: ICD-10-CM

## 2017-03-16 DIAGNOSIS — Z95.5 PRESENCE OF CORONARY ANGIOPLASTY IMPLANT AND GRAFT: ICD-10-CM

## 2017-03-19 LAB — SULFONYLUREA SERPL-MCNC: SIGNIFICANT CHANGE UP

## 2017-05-17 ENCOUNTER — INPATIENT (INPATIENT)
Facility: HOSPITAL | Age: 57
LOS: 4 days | Discharge: HOME | DRG: 918 | End: 2017-05-22
Attending: INTERNAL MEDICINE | Admitting: INTERNAL MEDICINE
Payer: COMMERCIAL

## 2017-05-17 VITALS — OXYGEN SATURATION: 98 % | TEMPERATURE: 98 F | WEIGHT: 289.91 LBS | HEIGHT: 69 IN

## 2017-05-17 LAB
ALBUMIN SERPL ELPH-MCNC: 3.2 G/DL — LOW (ref 3.5–5)
ALP SERPL-CCNC: 113 U/L — SIGNIFICANT CHANGE UP (ref 40–120)
ALT FLD-CCNC: 26 U/L DA — SIGNIFICANT CHANGE UP (ref 10–60)
ANION GAP SERPL CALC-SCNC: 6 MMOL/L — SIGNIFICANT CHANGE UP (ref 5–17)
APPEARANCE UR: CLEAR — SIGNIFICANT CHANGE UP
AST SERPL-CCNC: 20 U/L — SIGNIFICANT CHANGE UP (ref 10–40)
BACTERIA # UR AUTO: NEGATIVE /HPF — SIGNIFICANT CHANGE UP
BASE EXCESS BLDA CALC-SCNC: 2.3 MMOL/L — HIGH (ref -2–2)
BASOPHILS # BLD AUTO: 0.1 K/UL — SIGNIFICANT CHANGE UP (ref 0–0.2)
BASOPHILS NFR BLD AUTO: 0.4 % — SIGNIFICANT CHANGE UP (ref 0–2)
BILIRUB SERPL-MCNC: 0.1 MG/DL — LOW (ref 0.2–1.2)
BILIRUB UR-MCNC: NEGATIVE — SIGNIFICANT CHANGE UP
BLOOD GAS COMMENTS ARTERIAL: SIGNIFICANT CHANGE UP
BUN SERPL-MCNC: 9 MG/DL — SIGNIFICANT CHANGE UP (ref 7–18)
CALCIUM SERPL-MCNC: 9.2 MG/DL — SIGNIFICANT CHANGE UP (ref 8.4–10.5)
CHLORIDE SERPL-SCNC: 113 MMOL/L — HIGH (ref 96–108)
CO2 SERPL-SCNC: 27 MMOL/L — SIGNIFICANT CHANGE UP (ref 22–31)
COLOR SPEC: YELLOW — SIGNIFICANT CHANGE UP
COMMENT - URINE: SIGNIFICANT CHANGE UP
CREAT SERPL-MCNC: 1.15 MG/DL — SIGNIFICANT CHANGE UP (ref 0.5–1.3)
DIFF PNL FLD: NEGATIVE — SIGNIFICANT CHANGE UP
EOSINOPHIL # BLD AUTO: 0.1 K/UL — SIGNIFICANT CHANGE UP (ref 0–0.5)
EOSINOPHIL NFR BLD AUTO: 0.8 % — SIGNIFICANT CHANGE UP (ref 0–6)
EPI CELLS # UR: ABNORMAL (ref 0–10)
GLUCOSE SERPL-MCNC: 37 MG/DL — CRITICAL LOW (ref 70–99)
GLUCOSE UR QL: 250
HCO3 BLDA-SCNC: 26 MMOL/L — SIGNIFICANT CHANGE UP (ref 23–27)
HCT VFR BLD CALC: 50.5 % — HIGH (ref 39–50)
HGB BLD-MCNC: 16 G/DL — SIGNIFICANT CHANGE UP (ref 13–17)
HOROWITZ INDEX BLDA+IHG-RTO: 21 — SIGNIFICANT CHANGE UP
KETONES UR-MCNC: ABNORMAL
LEUKOCYTE ESTERASE UR-ACNC: ABNORMAL
LYMPHOCYTES # BLD AUTO: 19.4 % — SIGNIFICANT CHANGE UP (ref 13–44)
LYMPHOCYTES # BLD AUTO: 2.7 K/UL — SIGNIFICANT CHANGE UP (ref 1–3.3)
MCHC RBC-ENTMCNC: 30.8 PG — SIGNIFICANT CHANGE UP (ref 27–34)
MCHC RBC-ENTMCNC: 31.7 GM/DL — LOW (ref 32–36)
MCV RBC AUTO: 97.2 FL — SIGNIFICANT CHANGE UP (ref 80–100)
MONOCYTES # BLD AUTO: 0.6 K/UL — SIGNIFICANT CHANGE UP (ref 0–0.9)
MONOCYTES NFR BLD AUTO: 3.9 % — SIGNIFICANT CHANGE UP (ref 2–14)
NEUTROPHILS # BLD AUTO: 10.6 K/UL — HIGH (ref 1.8–7.4)
NEUTROPHILS NFR BLD AUTO: 75.4 % — SIGNIFICANT CHANGE UP (ref 43–77)
NITRITE UR-MCNC: NEGATIVE — SIGNIFICANT CHANGE UP
PCO2 BLDA: 42 MMHG — SIGNIFICANT CHANGE UP (ref 32–46)
PH BLDA: 7.42 — SIGNIFICANT CHANGE UP (ref 7.35–7.45)
PH UR: 6 — SIGNIFICANT CHANGE UP (ref 5–8)
PLATELET # BLD AUTO: 257 K/UL — SIGNIFICANT CHANGE UP (ref 150–400)
PO2 BLDA: 59 MMHG — LOW (ref 74–108)
POTASSIUM SERPL-MCNC: 2.9 MMOL/L — CRITICAL LOW (ref 3.5–5.3)
POTASSIUM SERPL-SCNC: 2.9 MMOL/L — CRITICAL LOW (ref 3.5–5.3)
PROT SERPL-MCNC: 7.6 G/DL — SIGNIFICANT CHANGE UP (ref 6–8.3)
PROT UR-MCNC: 30 MG/DL
RBC # BLD: 5.2 M/UL — SIGNIFICANT CHANGE UP (ref 4.2–5.8)
RBC # FLD: 14.7 % — HIGH (ref 10.3–14.5)
RBC CASTS # UR COMP ASSIST: SIGNIFICANT CHANGE UP /HPF (ref 0–2)
SAO2 % BLDA: 91 % — LOW (ref 92–96)
SODIUM SERPL-SCNC: 146 MMOL/L — HIGH (ref 135–145)
SP GR SPEC: 1.02 — SIGNIFICANT CHANGE UP (ref 1.01–1.02)
UROBILINOGEN FLD QL: NEGATIVE — SIGNIFICANT CHANGE UP
WBC # BLD: 14 K/UL — HIGH (ref 3.8–10.5)
WBC # FLD AUTO: 14 K/UL — HIGH (ref 3.8–10.5)
WBC UR QL: SIGNIFICANT CHANGE UP /HPF (ref 0–5)

## 2017-05-17 PROCEDURE — 70450 CT HEAD/BRAIN W/O DYE: CPT | Mod: 26

## 2017-05-17 PROCEDURE — 71010: CPT | Mod: 26

## 2017-05-17 RX ORDER — DEXTROSE 50 % IN WATER 50 %
25 SYRINGE (ML) INTRAVENOUS ONCE
Qty: 0 | Refills: 0 | Status: COMPLETED | OUTPATIENT
Start: 2017-05-17 | End: 2017-05-17

## 2017-05-17 RX ORDER — DEXTROSE 10 % IN WATER 10 %
1000 INTRAVENOUS SOLUTION INTRAVENOUS
Qty: 0 | Refills: 0 | Status: DISCONTINUED | OUTPATIENT
Start: 2017-05-17 | End: 2017-05-18

## 2017-05-17 RX ORDER — SODIUM CHLORIDE 9 MG/ML
1000 INJECTION INTRAMUSCULAR; INTRAVENOUS; SUBCUTANEOUS ONCE
Qty: 0 | Refills: 0 | Status: COMPLETED | OUTPATIENT
Start: 2017-05-17 | End: 2017-05-17

## 2017-05-17 RX ORDER — POTASSIUM CHLORIDE 20 MEQ
40 PACKET (EA) ORAL ONCE
Qty: 0 | Refills: 0 | Status: COMPLETED | OUTPATIENT
Start: 2017-05-17 | End: 2017-05-17

## 2017-05-17 RX ORDER — POTASSIUM CHLORIDE 20 MEQ
10 PACKET (EA) ORAL
Qty: 0 | Refills: 0 | Status: COMPLETED | OUTPATIENT
Start: 2017-05-17 | End: 2017-05-18

## 2017-05-17 RX ORDER — DEXTROSE 50 % IN WATER 50 %
50 SYRINGE (ML) INTRAVENOUS ONCE
Qty: 0 | Refills: 0 | Status: COMPLETED | OUTPATIENT
Start: 2017-05-17 | End: 2017-05-17

## 2017-05-17 RX ORDER — SODIUM CHLORIDE 9 MG/ML
1000 INJECTION, SOLUTION INTRAVENOUS
Qty: 0 | Refills: 0 | Status: DISCONTINUED | OUTPATIENT
Start: 2017-05-17 | End: 2017-05-18

## 2017-05-17 RX ADMIN — Medication 125 MILLILITER(S): at 23:35

## 2017-05-17 RX ADMIN — Medication 25 GRAM(S): at 21:04

## 2017-05-17 RX ADMIN — Medication 25 GRAM(S): at 20:24

## 2017-05-17 RX ADMIN — Medication 40 MILLIEQUIVALENT(S): at 21:22

## 2017-05-17 RX ADMIN — SODIUM CHLORIDE 1000 MILLILITER(S): 9 INJECTION INTRAMUSCULAR; INTRAVENOUS; SUBCUTANEOUS at 20:29

## 2017-05-17 RX ADMIN — Medication 25 GRAM(S): at 22:53

## 2017-05-17 RX ADMIN — Medication 50 MILLILITER(S): at 23:35

## 2017-05-17 RX ADMIN — Medication 25 GRAM(S): at 22:25

## 2017-05-17 RX ADMIN — SODIUM CHLORIDE 150 MILLILITER(S): 9 INJECTION, SOLUTION INTRAVENOUS at 21:07

## 2017-05-17 NOTE — H&P ADULT - PROBLEM SELECTOR PLAN 3
Continue home medications. Continue home medications aspirin, metoprolol, imdur, lipitor, ranexa, spironolactone

## 2017-05-17 NOTE — H&P ADULT - HISTORY OF PRESENT ILLNESS
57 obese male from home, lives with wife, with PMH of CAD with stent x1( august 2016), CHF, arthritis, HTN, DM, JONAH (on BIPAP) presented to the ED with complaint of weakness and hypoglycemia. As per the patient he took both insulin and metformin today prior to eating dinner. He was unable to eat due to weakness and presented to the ED. On arrival to the ED, he BG was 49. He received D50 x 3 in ED with persistent hypoglycemia to 45 again, which later improved to 76. He was started on D5. He denied nausea, vomiting, fever, chills, shortness of breath, cough, chest pain, palpitations, abdominal pain, urinary symptoms, or diarrhea. The patient was recently discharged on 3/13/17 after hospitalization for hypoglycemia. All insulin was discontinued at that time and discharge instructions indicated he was to continue only on metformin. The patient has several admissions to several hospitals due to recurrent hypoglycemia. Insulin and cpeptide levels were both elevated on previous admission. A1C in 3/2017 was 7.7.     PSH umbilical hernia bipolar disorder presented to ED with hypoglycemia. 57 obese male from home, lives with wife, with PMH of CAD with stent x1( august 2016), CHF, arthritis, HTN, DM, JONAH (on BIPAP) presented to the ED with complaint of weakness and hypoglycemia. As per the patient he developed weakness 2 days ago. He checked his blood glucose and it was 77. He decided to hold his insulin dose due to the hypoglycemia. Today, he again felt weakness associated with mild nausea and noted hypoglycemia to 70's. On arrival to the ED, he BG was 49. He received D50 x 3 in ED with persistent hypoglycemia to 45 again, which later improved to 76. He was started on D10 in the ED with improvement of BG to 207. He denied nausea, vomiting, fever, chills, shortness of breath, cough, chest pain, palpitations, abdominal pain, urinary symptoms, or diarrhea. The patient was recently discharged on 3/13/17 after hospitalization for hypoglycemia. All insulin was discontinued at that time and discharge instructions indicated he was to continue only on metformin. As per the patient he recently saw his endocrinologist Dr. Aguiar and he was restarted no insulin (30 units in AM, patient unsure name of insulin) and oral anithyperglycemic medications were discontinued. The patient has several admissions due to recurrent hypoglycemia. Insulin and cpeptide levels were both elevated on previous admission. A1C in 3/2017 was 7.7.     PSH umbilical hernia bipolar disorder presented to ED with hypoglycemia.

## 2017-05-17 NOTE — H&P ADULT - NSHPLABSRESULTS_GEN_ALL_CORE
16.0   14.0  )-----------( 257      ( 17 May 2017 20:28 )             50.5           146<H>  |  113<H>  |  9   ----------------------------<  37<LL>  2.9<LL>   |  27  |  1.15    Ca    9.2      17 May 2017 20:28  Mg     2.2         TPro  7.6  /  Alb  3.2<L>  /  TBili  0.1<L>  /  DBili  x   /  AST  20  /  ALT  26  /  AlkPhos  113            ABG - ( 17 May 2017 21:59 )  pH: 7.42  /  pCO2: 42    /  pO2: 59    / HCO3: 26    / Base Excess: 2.3   /  SaO2: 91                  Urinalysis Basic - ( 17 May 2017 23:45 )    Color: Yellow / Appearance: Clear / S.020 / pH: x  Gluc: x / Ketone: Trace  / Bili: Negative / Urobili: Negative   Blood: x / Protein: 30 mg/dL / Nitrite: Negative   Leuk Esterase: Trace / RBC: 0-2 /HPF / WBC 0-2 /HPF   Sq Epi: x / Non Sq Epi: Occasional / Bacteria: Negative /HPF            Lactate Trend            CAPILLARY BLOOD GLUCOSE  76 (17 May 2017 23:15)

## 2017-05-17 NOTE — H&P ADULT - ATTENDING COMMENTS
Patient is a 56y/o male with PMH of CAD s/p coronary stent, OSAS on Bipap, now presenting with chief complaint of weakness and nausea. His FS glucose was low and he hasn't taken insulin in 2 days. On previous admissions for the same Dx he has had elevated C- peptide and insulin levels. FS glucose in the ED as low as 40mg/dl. Differential Dx includes; exogenous insulin, pituitary abnormality (CT head -), liver disease, adrenal insufficiency, infection (no evidence) and neoplasm. Will monitor FS glucose hourly and administer D10 IV. Endocrine consult in AM.

## 2017-05-17 NOTE — ED PROVIDER NOTE - OBJECTIVE STATEMENT
56 y/o M pt with PMHx of Obesity, Arthritis, CAD, CHF, DM (Type II; takes insulin and oral hypoglycemics), and JONAH (with BiPAP) and PSHx of Umbilical Hernia presents to ED c/o general weakness s/p hypoglycemia today. Pt reports taking both his insulin and oral hypoglycemics today; pt was about to make dinner but was too weak to do so, which prompted his ED visit. Pt denies nausea, vomiting, diarrhea, cough, or any other complaints. NKDA.

## 2017-05-17 NOTE — H&P ADULT - PROBLEM SELECTOR PLAN 2
Patient with hypokalemia likely secondary to excess insulin intake. K level 2.9 on admission. Patient received 40meq PO and 10meq x 3 IV.   -follow up repeat K

## 2017-05-17 NOTE — ED ADULT NURSE NOTE - ED STAT RN HANDOFF DETAILS
endorse pt to RN Chase with no signs of any distress no signs of any discomfort pt endorse pt to ICU withv/s stable.

## 2017-05-17 NOTE — ED ADULT NURSE REASSESSMENT NOTE - NS ED NURSE REASSESS COMMENT FT1
attending PMD Rodger and NATASHA Rosario aware of the pt FS of 49. endorse pt to NATASHA rosario
[id number] - pt sleeping  but arousable on D5 in progress, pt FS is still low even pt is on D5 IV. MD Yip aware ordered another amp of dextrose 50 pt stable no distress iv dextrose given as ordered  for ICU consult.

## 2017-05-17 NOTE — H&P ADULT - PROBLEM SELECTOR PLAN 1
Patient presented with severe hypoglycemia s/p insulin and oral hypoglycemic medication.   -admit to ICU  -continue D5 to maintain BG>90. Consider D10 if blood glucose does not improve.  -hold insulin and oral antihypoglycemic medications  -monitor blood glucose q 1 hr until blood glucose normalizes for >2 readings.  -follow up endocrinology, Dr. Aguiar  -continue consistent carbohydrate diet   -continue hypoglycemia protocol Patient presented with severe hypoglycemia s/p insulin and oral hypoglycemic medication.   -admit to ICU  -continue D5 to maintain BG>90. Consider D10 if blood glucose does not improve.  -hold insulin and oral antihypoglycemic medications  -monitor blood glucose q 1 hr until blood glucose normalizes for >2 readings.  -follow up endocrinology, Dr. Aguiar  -continue consistent carbohydrate diet   -continue hypoglycemia protocol  -Ct head negative Patient presented with severe hypoglycemia s/p insulin 2 days ago and recurrent admissions due to hypoglycemia. Concern for excessive insulin administration.   -admit to ICU  -continue D5 to maintain BG>90. Consider D10 if blood glucose does not improve.  -hold insulin and oral antihypoglycemic medications  -monitor blood glucose q 1 hr until blood glucose normalizes for >2 readings.  -follow up endocrinology, Dr. Aguiar  -continue consistent carbohydrate diet   -continue hypoglycemia protocol  -Ct head negative  -follow up repeat insulin level, c peptide, and A1c Patient presented with severe hypoglycemia s/p insulin 2 days ago and recurrent admissions due to hypoglycemia. Concern for excessive insulin administration vs insulin sensitivity with recurrent hypoglycemic episodes while on insulin.  -admit to ICU  -continue D5 to maintain BG>90. Consider D10 if blood glucose does not improve.  -hold insulin and oral antihypoglycemic medications  -monitor blood glucose q 1 hr until blood glucose normalizes for >2 readings.  -follow up endocrinology, Dr. Aguiar  -continue consistent carbohydrate diet   -continue hypoglycemia protocol  -Ct head negative  -follow up repeat insulin level, c peptide, and A1c

## 2017-05-17 NOTE — ED PROVIDER NOTE - MEDICAL DECISION MAKING DETAILS
58 y/o M pt presents with general weakness s/p hypoglycemia today. Plan for labs, D50, and re-evaluate with likely d/c home.

## 2017-05-17 NOTE — H&P ADULT - NSHPPHYSICALEXAM_GEN_ALL_CORE
General: A/ox 3, No acute Distress  Neck: Supple, NO JVD  Cardiac: S1 S2, No M/R/G  Pulmonary: CTAB, Breathing unlabored, No Rhonchi/Rales/Wheezing  Abdomen: Soft, Non -tender, +BS x 4 quads  Extremities: No Rashes, No edema  Neuro: A/o x 3, No focal deficits General: obese middle aged male, A/ox 3, No acute Distress  Neck: Supple, NO JVD  Cardiac: S1 S2, No M/R/G  Pulmonary: CTAB, Breathing unlabored, No Rhonchi/Rales/Wheezing  Abdomen: Soft, Non -tender, +BS x 4 quads  Extremities: No Rashes, No edema  Neuro: A/o x 3, No focal deficits

## 2017-05-17 NOTE — H&P ADULT - ASSESSMENT
57 obese male from home, lives with wife, with PMH of CAD with stent x1( august 2016), CHF, arthritis, HTN, DM, JONAH (on BIPAP) presented to the ED with complaint of weakness and hypoglycemia. Concern for excessive insulin administration vs insulin sensitivity with recurrent hypoglycemic episodes while on insulin.

## 2017-05-17 NOTE — ED PROVIDER NOTE - NS ED MD SCRIBE ATTENDING SCRIBE SECTIONS
HIV/HISTORY OF PRESENT ILLNESS/PHYSICAL EXAM/VITAL SIGNS( Pullset)/REVIEW OF SYSTEMS/PAST MEDICAL/SURGICAL/SOCIAL HISTORY/RESULTS/DISPOSITION

## 2017-05-17 NOTE — H&P ADULT - NSHPREVIEWOFSYSTEMS_GEN_ALL_CORE
REVIEW OF SYSTEMS:    CONSTITUTIONAL: No weakness, fevers or chills  EYES/ENT: No visual changes;  No vertigo or throat pain   NECK: No pain or stiffness  RESPIRATORY: No cough, wheezing, hemoptysis; No shortness of breath  CARDIOVASCULAR: No chest pain or palpitations  GASTROINTESTINAL: No abdominal or epigastric pain. No nausea, vomiting, or hematemesis; No diarrhea or constipation. No melena or hematochezia.  GENITOURINARY: No dysuria, frequency or hematuria  NEUROLOGICAL: No numbness or weakness  SKIN: No itching, burning, rashes, or lesions   All other review of systems is negative unless indicated above. REVIEW OF SYSTEMS:    CONSTITUTIONAL: obese, weakness, No fevers or chills  EYES/ENT: No visual changes;  No vertigo or throat pain   NECK: No pain or stiffness  RESPIRATORY: No cough, wheezing, hemoptysis; No shortness of breath  CARDIOVASCULAR: No chest pain or palpitations  GASTROINTESTINAL: No abdominal or epigastric pain. No nausea, vomiting, or hematemesis; No diarrhea or constipation. No melena or hematochezia.  GENITOURINARY: No dysuria, frequency or hematuria  NEUROLOGICAL: No numbness, no syncope, no loss of consciousness   SKIN: No itching, burning, rashes, or lesions   All other review of systems is negative unless indicated above. REVIEW OF SYSTEMS:    CONSTITUTIONAL: obese, weakness, No fevers or chills  EYES/ENT: No visual changes;  No vertigo or throat pain   NECK: No pain or stiffness  RESPIRATORY: No cough, wheezing, hemoptysis; No shortness of breath  CARDIOVASCULAR: No chest pain or palpitations  GASTROINTESTINAL: Nausea,  No abdominal or epigastric pain. No vomiting, or hematemesis; No diarrhea or constipation. No melena or hematochezia.  GENITOURINARY: No dysuria, frequency or hematuria  NEUROLOGICAL: No numbness, no syncope, no loss of consciousness   SKIN: No itching, burning, rashes, or lesions   All other review of systems is negative unless indicated above.

## 2017-05-17 NOTE — H&P ADULT - PROBLEM SELECTOR PLAN 5
Patient is active smoker, trying to quit. Patient counseled on smoking cessation and agreed to nicotine patch.

## 2017-05-18 DIAGNOSIS — F31.9 BIPOLAR DISORDER, UNSPECIFIED: ICD-10-CM

## 2017-05-18 DIAGNOSIS — E16.2 HYPOGLYCEMIA, UNSPECIFIED: ICD-10-CM

## 2017-05-18 DIAGNOSIS — I25.10 ATHEROSCLEROTIC HEART DISEASE OF NATIVE CORONARY ARTERY WITHOUT ANGINA PECTORIS: ICD-10-CM

## 2017-05-18 DIAGNOSIS — G47.33 OBSTRUCTIVE SLEEP APNEA (ADULT) (PEDIATRIC): ICD-10-CM

## 2017-05-18 DIAGNOSIS — Z29.9 ENCOUNTER FOR PROPHYLACTIC MEASURES, UNSPECIFIED: ICD-10-CM

## 2017-05-18 DIAGNOSIS — E87.6 HYPOKALEMIA: ICD-10-CM

## 2017-05-18 DIAGNOSIS — F17.200 NICOTINE DEPENDENCE, UNSPECIFIED, UNCOMPLICATED: ICD-10-CM

## 2017-05-18 LAB
ALBUMIN SERPL ELPH-MCNC: 2.5 G/DL — LOW (ref 3.5–5)
ALP SERPL-CCNC: 86 U/L — SIGNIFICANT CHANGE UP (ref 40–120)
ALT FLD-CCNC: 21 U/L DA — SIGNIFICANT CHANGE UP (ref 10–60)
ANION GAP SERPL CALC-SCNC: 6 MMOL/L — SIGNIFICANT CHANGE UP (ref 5–17)
ANION GAP SERPL CALC-SCNC: 6 MMOL/L — SIGNIFICANT CHANGE UP (ref 5–17)
AST SERPL-CCNC: 15 U/L — SIGNIFICANT CHANGE UP (ref 10–40)
BILIRUB SERPL-MCNC: 0.2 MG/DL — SIGNIFICANT CHANGE UP (ref 0.2–1.2)
BUN SERPL-MCNC: 7 MG/DL — SIGNIFICANT CHANGE UP (ref 7–18)
BUN SERPL-MCNC: 8 MG/DL — SIGNIFICANT CHANGE UP (ref 7–18)
C PEPTIDE SERPL-MCNC: 0.9 NG/ML — SIGNIFICANT CHANGE UP (ref 0.9–7.1)
CALCIUM SERPL-MCNC: 7.5 MG/DL — LOW (ref 8.4–10.5)
CALCIUM SERPL-MCNC: 8.5 MG/DL — SIGNIFICANT CHANGE UP (ref 8.4–10.5)
CHLORIDE SERPL-SCNC: 109 MMOL/L — HIGH (ref 96–108)
CHLORIDE SERPL-SCNC: 111 MMOL/L — HIGH (ref 96–108)
CO2 SERPL-SCNC: 24 MMOL/L — SIGNIFICANT CHANGE UP (ref 22–31)
CO2 SERPL-SCNC: 27 MMOL/L — SIGNIFICANT CHANGE UP (ref 22–31)
CREAT SERPL-MCNC: 0.8 MG/DL — SIGNIFICANT CHANGE UP (ref 0.5–1.3)
CREAT SERPL-MCNC: 0.93 MG/DL — SIGNIFICANT CHANGE UP (ref 0.5–1.3)
GLUCOSE SERPL-MCNC: 102 MG/DL — HIGH (ref 70–99)
GLUCOSE SERPL-MCNC: 150 MG/DL — HIGH (ref 70–99)
HBA1C BLD-MCNC: 8.9 % — HIGH (ref 4–5.6)
HCT VFR BLD CALC: 45.8 % — SIGNIFICANT CHANGE UP (ref 39–50)
HGB BLD-MCNC: 14.5 G/DL — SIGNIFICANT CHANGE UP (ref 13–17)
INSULIN SERPL-MCNC: 77 UU/ML — HIGH (ref 3–17)
MAGNESIUM SERPL-MCNC: 1.9 MG/DL — SIGNIFICANT CHANGE UP (ref 1.6–2.6)
MAGNESIUM SERPL-MCNC: 2.2 MG/DL — SIGNIFICANT CHANGE UP (ref 1.6–2.6)
MCHC RBC-ENTMCNC: 31 PG — SIGNIFICANT CHANGE UP (ref 27–34)
MCHC RBC-ENTMCNC: 31.6 GM/DL — LOW (ref 32–36)
MCV RBC AUTO: 97.9 FL — SIGNIFICANT CHANGE UP (ref 80–100)
PHOSPHATE SERPL-MCNC: 2 MG/DL — LOW (ref 2.5–4.5)
PLATELET # BLD AUTO: 188 K/UL — SIGNIFICANT CHANGE UP (ref 150–400)
POTASSIUM SERPL-MCNC: 3.2 MMOL/L — LOW (ref 3.5–5.3)
POTASSIUM SERPL-MCNC: 4.7 MMOL/L — SIGNIFICANT CHANGE UP (ref 3.5–5.3)
POTASSIUM SERPL-SCNC: 3.2 MMOL/L — LOW (ref 3.5–5.3)
POTASSIUM SERPL-SCNC: 4.7 MMOL/L — SIGNIFICANT CHANGE UP (ref 3.5–5.3)
PROT SERPL-MCNC: 6 G/DL — SIGNIFICANT CHANGE UP (ref 6–8.3)
RBC # BLD: 4.67 M/UL — SIGNIFICANT CHANGE UP (ref 4.2–5.8)
RBC # FLD: 14.8 % — HIGH (ref 10.3–14.5)
SODIUM SERPL-SCNC: 139 MMOL/L — SIGNIFICANT CHANGE UP (ref 135–145)
SODIUM SERPL-SCNC: 144 MMOL/L — SIGNIFICANT CHANGE UP (ref 135–145)
WBC # BLD: 12.4 K/UL — HIGH (ref 3.8–10.5)
WBC # FLD AUTO: 12.4 K/UL — HIGH (ref 3.8–10.5)

## 2017-05-18 PROCEDURE — 99291 CRITICAL CARE FIRST HOUR: CPT

## 2017-05-18 PROCEDURE — 99285 EMERGENCY DEPT VISIT HI MDM: CPT | Mod: 25

## 2017-05-18 PROCEDURE — 99222 1ST HOSP IP/OBS MODERATE 55: CPT

## 2017-05-18 RX ORDER — SODIUM CHLORIDE 9 MG/ML
1000 INJECTION INTRAMUSCULAR; INTRAVENOUS; SUBCUTANEOUS
Qty: 0 | Refills: 0 | Status: DISCONTINUED | OUTPATIENT
Start: 2017-05-18 | End: 2017-05-18

## 2017-05-18 RX ORDER — RANOLAZINE 500 MG/1
500 TABLET, FILM COATED, EXTENDED RELEASE ORAL
Qty: 0 | Refills: 0 | Status: DISCONTINUED | OUTPATIENT
Start: 2017-05-18 | End: 2017-05-22

## 2017-05-18 RX ORDER — DEXTROSE 10 % IN WATER 10 %
1000 INTRAVENOUS SOLUTION INTRAVENOUS
Qty: 0 | Refills: 0 | Status: DISCONTINUED | OUTPATIENT
Start: 2017-05-18 | End: 2017-05-19

## 2017-05-18 RX ORDER — DEXTROSE 50 % IN WATER 50 %
25 SYRINGE (ML) INTRAVENOUS ONCE
Qty: 0 | Refills: 0 | Status: DISCONTINUED | OUTPATIENT
Start: 2017-05-18 | End: 2017-05-22

## 2017-05-18 RX ORDER — OXCARBAZEPINE 300 MG/1
600 TABLET, FILM COATED ORAL DAILY
Qty: 0 | Refills: 0 | Status: DISCONTINUED | OUTPATIENT
Start: 2017-05-18 | End: 2017-05-22

## 2017-05-18 RX ORDER — CLOPIDOGREL BISULFATE 75 MG/1
75 TABLET, FILM COATED ORAL DAILY
Qty: 0 | Refills: 0 | Status: DISCONTINUED | OUTPATIENT
Start: 2017-05-18 | End: 2017-05-22

## 2017-05-18 RX ORDER — SODIUM CHLORIDE 9 MG/ML
1000 INJECTION, SOLUTION INTRAVENOUS
Qty: 0 | Refills: 0 | Status: DISCONTINUED | OUTPATIENT
Start: 2017-05-18 | End: 2017-05-18

## 2017-05-18 RX ORDER — SPIRONOLACTONE 25 MG/1
25 TABLET, FILM COATED ORAL DAILY
Qty: 0 | Refills: 0 | Status: DISCONTINUED | OUTPATIENT
Start: 2017-05-18 | End: 2017-05-22

## 2017-05-18 RX ORDER — ISOSORBIDE MONONITRATE 60 MG/1
30 TABLET, EXTENDED RELEASE ORAL DAILY
Qty: 0 | Refills: 0 | Status: DISCONTINUED | OUTPATIENT
Start: 2017-05-18 | End: 2017-05-22

## 2017-05-18 RX ORDER — SODIUM CHLORIDE 9 MG/ML
1000 INJECTION, SOLUTION INTRAVENOUS
Qty: 0 | Refills: 0 | Status: DISCONTINUED | OUTPATIENT
Start: 2017-05-18 | End: 2017-05-19

## 2017-05-18 RX ORDER — DEXTROSE 50 % IN WATER 50 %
12.5 SYRINGE (ML) INTRAVENOUS ONCE
Qty: 0 | Refills: 0 | Status: DISCONTINUED | OUTPATIENT
Start: 2017-05-18 | End: 2017-05-22

## 2017-05-18 RX ORDER — FUROSEMIDE 40 MG
40 TABLET ORAL DAILY
Qty: 0 | Refills: 0 | Status: DISCONTINUED | OUTPATIENT
Start: 2017-05-18 | End: 2017-05-19

## 2017-05-18 RX ORDER — RANOLAZINE 500 MG/1
500 TABLET, FILM COATED, EXTENDED RELEASE ORAL
Qty: 0 | Refills: 0 | Status: DISCONTINUED | OUTPATIENT
Start: 2017-05-18 | End: 2017-05-18

## 2017-05-18 RX ORDER — DEXTROSE 50 % IN WATER 50 %
1 SYRINGE (ML) INTRAVENOUS ONCE
Qty: 0 | Refills: 0 | Status: DISCONTINUED | OUTPATIENT
Start: 2017-05-18 | End: 2017-05-22

## 2017-05-18 RX ORDER — DEXTROSE 50 % IN WATER 50 %
25 SYRINGE (ML) INTRAVENOUS ONCE
Qty: 0 | Refills: 0 | Status: COMPLETED | OUTPATIENT
Start: 2017-05-18 | End: 2017-05-18

## 2017-05-18 RX ORDER — ASPIRIN/CALCIUM CARB/MAGNESIUM 324 MG
81 TABLET ORAL DAILY
Qty: 0 | Refills: 0 | Status: DISCONTINUED | OUTPATIENT
Start: 2017-05-18 | End: 2017-05-22

## 2017-05-18 RX ORDER — FLUOXETINE HCL 10 MG
40 CAPSULE ORAL DAILY
Qty: 0 | Refills: 0 | Status: DISCONTINUED | OUTPATIENT
Start: 2017-05-18 | End: 2017-05-22

## 2017-05-18 RX ORDER — ENOXAPARIN SODIUM 100 MG/ML
40 INJECTION SUBCUTANEOUS DAILY
Qty: 0 | Refills: 0 | Status: DISCONTINUED | OUTPATIENT
Start: 2017-05-18 | End: 2017-05-22

## 2017-05-18 RX ORDER — ATORVASTATIN CALCIUM 80 MG/1
10 TABLET, FILM COATED ORAL AT BEDTIME
Qty: 0 | Refills: 0 | Status: DISCONTINUED | OUTPATIENT
Start: 2017-05-18 | End: 2017-05-22

## 2017-05-18 RX ORDER — LOSARTAN POTASSIUM 100 MG/1
25 TABLET, FILM COATED ORAL DAILY
Qty: 0 | Refills: 0 | Status: DISCONTINUED | OUTPATIENT
Start: 2017-05-18 | End: 2017-05-22

## 2017-05-18 RX ORDER — GLUCAGON INJECTION, SOLUTION 0.5 MG/.1ML
1 INJECTION, SOLUTION SUBCUTANEOUS ONCE
Qty: 0 | Refills: 0 | Status: DISCONTINUED | OUTPATIENT
Start: 2017-05-18 | End: 2017-05-22

## 2017-05-18 RX ORDER — NICOTINE POLACRILEX 2 MG
1 GUM BUCCAL DAILY
Qty: 0 | Refills: 0 | Status: DISCONTINUED | OUTPATIENT
Start: 2017-05-18 | End: 2017-05-22

## 2017-05-18 RX ORDER — PANTOPRAZOLE SODIUM 20 MG/1
40 TABLET, DELAYED RELEASE ORAL
Qty: 0 | Refills: 0 | Status: DISCONTINUED | OUTPATIENT
Start: 2017-05-18 | End: 2017-05-22

## 2017-05-18 RX ORDER — TOPIRAMATE 25 MG
50 TABLET ORAL
Qty: 0 | Refills: 0 | Status: DISCONTINUED | OUTPATIENT
Start: 2017-05-18 | End: 2017-05-22

## 2017-05-18 RX ORDER — SODIUM,POTASSIUM PHOSPHATES 278-250MG
1 POWDER IN PACKET (EA) ORAL
Qty: 0 | Refills: 0 | Status: COMPLETED | OUTPATIENT
Start: 2017-05-18 | End: 2017-05-18

## 2017-05-18 RX ORDER — ALBUTEROL 90 UG/1
2 AEROSOL, METERED ORAL EVERY 6 HOURS
Qty: 0 | Refills: 0 | Status: DISCONTINUED | OUTPATIENT
Start: 2017-05-18 | End: 2017-05-22

## 2017-05-18 RX ORDER — METOPROLOL TARTRATE 50 MG
25 TABLET ORAL DAILY
Qty: 0 | Refills: 0 | Status: DISCONTINUED | OUTPATIENT
Start: 2017-05-18 | End: 2017-05-22

## 2017-05-18 RX ORDER — DEXTROSE 50 % IN WATER 50 %
50 SYRINGE (ML) INTRAVENOUS ONCE
Qty: 0 | Refills: 0 | Status: COMPLETED | OUTPATIENT
Start: 2017-05-18 | End: 2017-05-18

## 2017-05-18 RX ADMIN — SODIUM CHLORIDE 125 MILLILITER(S): 9 INJECTION, SOLUTION INTRAVENOUS at 06:04

## 2017-05-18 RX ADMIN — Medication 1 TABLET(S): at 08:30

## 2017-05-18 RX ADMIN — Medication 25 MILLILITER(S): at 05:00

## 2017-05-18 RX ADMIN — Medication 100 MILLIEQUIVALENT(S): at 02:11

## 2017-05-18 RX ADMIN — LOSARTAN POTASSIUM 25 MILLIGRAM(S): 100 TABLET, FILM COATED ORAL at 06:03

## 2017-05-18 RX ADMIN — Medication 30 MILLILITER(S): at 02:11

## 2017-05-18 RX ADMIN — CLOPIDOGREL BISULFATE 75 MILLIGRAM(S): 75 TABLET, FILM COATED ORAL at 13:18

## 2017-05-18 RX ADMIN — Medication 30 MILLILITER(S): at 16:00

## 2017-05-18 RX ADMIN — Medication 100 MILLIEQUIVALENT(S): at 01:12

## 2017-05-18 RX ADMIN — Medication 100 MILLIEQUIVALENT(S): at 03:39

## 2017-05-18 RX ADMIN — ENOXAPARIN SODIUM 40 MILLIGRAM(S): 100 INJECTION SUBCUTANEOUS at 13:18

## 2017-05-18 RX ADMIN — Medication 40 MILLIGRAM(S): at 06:02

## 2017-05-18 RX ADMIN — Medication 1 TABLET(S): at 21:05

## 2017-05-18 RX ADMIN — Medication 1 TABLET(S): at 17:58

## 2017-05-18 RX ADMIN — SODIUM CHLORIDE 200 MILLILITER(S): 9 INJECTION, SOLUTION INTRAVENOUS at 06:52

## 2017-05-18 RX ADMIN — SPIRONOLACTONE 25 MILLIGRAM(S): 25 TABLET, FILM COATED ORAL at 06:03

## 2017-05-18 RX ADMIN — Medication 40 MILLIGRAM(S): at 13:14

## 2017-05-18 RX ADMIN — RANOLAZINE 500 MILLIGRAM(S): 500 TABLET, FILM COATED, EXTENDED RELEASE ORAL at 06:02

## 2017-05-18 RX ADMIN — Medication 50 MILLIGRAM(S): at 17:59

## 2017-05-18 RX ADMIN — Medication 1 PATCH: at 14:00

## 2017-05-18 RX ADMIN — ISOSORBIDE MONONITRATE 30 MILLIGRAM(S): 60 TABLET, EXTENDED RELEASE ORAL at 13:20

## 2017-05-18 RX ADMIN — SODIUM CHLORIDE 100 MILLILITER(S): 9 INJECTION, SOLUTION INTRAVENOUS at 03:38

## 2017-05-18 RX ADMIN — Medication 25 MILLILITER(S): at 06:53

## 2017-05-18 RX ADMIN — OXCARBAZEPINE 600 MILLIGRAM(S): 300 TABLET, FILM COATED ORAL at 13:16

## 2017-05-18 RX ADMIN — Medication 50 MILLIGRAM(S): at 06:02

## 2017-05-18 RX ADMIN — Medication 25 MILLIGRAM(S): at 06:02

## 2017-05-18 RX ADMIN — Medication 81 MILLIGRAM(S): at 13:18

## 2017-05-18 RX ADMIN — ATORVASTATIN CALCIUM 10 MILLIGRAM(S): 80 TABLET, FILM COATED ORAL at 21:04

## 2017-05-18 RX ADMIN — Medication 1 TABLET(S): at 13:14

## 2017-05-18 RX ADMIN — Medication 30 MILLILITER(S): at 10:00

## 2017-05-18 RX ADMIN — Medication 50 MILLILITER(S): at 11:02

## 2017-05-18 RX ADMIN — RANOLAZINE 500 MILLIGRAM(S): 500 TABLET, FILM COATED, EXTENDED RELEASE ORAL at 17:59

## 2017-05-18 RX ADMIN — Medication 25 MILLILITER(S): at 03:38

## 2017-05-18 RX ADMIN — SODIUM CHLORIDE 75 MILLILITER(S): 9 INJECTION INTRAMUSCULAR; INTRAVENOUS; SUBCUTANEOUS at 02:11

## 2017-05-18 RX ADMIN — PANTOPRAZOLE SODIUM 40 MILLIGRAM(S): 20 TABLET, DELAYED RELEASE ORAL at 06:03

## 2017-05-18 NOTE — BEHAVIORAL HEALTH ASSESSMENT NOTE - NSBHCHARTREVIEWINVESTIGATE_PSY_A_CORE FT
Ventricular Rate 56 BPM    Atrial Rate 56 BPM    P-R Interval 142 ms    QRS Duration 106 ms     ms    QTc 483 ms    P Axis 26 degrees    R Axis 32 degrees    T Axis 37 degrees    Diagnosis Line Sinus bradycardia with Premature atrial complexes  Prolonged QT  Abnormal ECG  Confirmed by CATARINO ROSENTHAL, RICHELLE ([id number]) on 11-Mar-2017 08:20:22

## 2017-05-18 NOTE — CONSULT NOTE ADULT - ASSESSMENT
58 yo morbidly obese M from home, lives with wife, with PMHx Bipolar Disorder, CAD s/p stent x 1, CHF, arthritis, HTN, DM II, JONAH on BIPAP, recently discharged 3/13/17 after hospitalization for hypoglycemia and instructed to d/c Lantus 30 U sc AM and c/w Metformin po bid, however, pt c/w prior diabetic medications, and p/w c/o generalized weakness, chills, malaise, and hypoglycemia.  Pt's home diabetic medications are on hold and pt started on D10 and D5 with improving signs and symptoms. 56 yo morbidly obese M from home, lives with wife, with PMHx Bipolar Disorder, CAD s/p stent x 1, CHF, arthritis, HTN, DM II, JONAH on BIPAP, recently discharged 3/13/17 after hospitalization for hypoglycemia, who now p/w c/o generalized weakness, chills, malaise, and hypoglycemia likely 2/2 overdosage of insulin.  Pt's home diabetic medications are on hold and pt started on D10.

## 2017-05-18 NOTE — BEHAVIORAL HEALTH ASSESSMENT NOTE - NSBHCHARTREVIEWLAB_PSY_A_CORE FT
14.5   12.4  )-----------( 188      ( 18 May 2017 06:46 )             45.8     05-18    139  |  109<H>  |  7   ----------------------------<  150<H>  4.7   |  24  |  0.80    Ca    7.5<L>      18 May 2017 06:46  Phos  2.0     05-18  Mg     1.9     05-18    TPro  6.0  /  Alb  2.5<L>  /  TBili  0.2  /  DBili  x   /  AST  15  /  ALT  21  /  AlkPhos  86  05-18    LIVER FUNCTIONS - ( 18 May 2017 06:46 )  Alb: 2.5 g/dL / Pro: 6.0 g/dL / ALK PHOS: 86 U/L / ALT: 21 U/L DA / AST: 15 U/L / GGT: x

## 2017-05-18 NOTE — BEHAVIORAL HEALTH ASSESSMENT NOTE - HPI (INCLUDE ILLNESS QUALITY, SEVERITY, DURATION, TIMING, CONTEXT, MODIFYING FACTORS, ASSOCIATED SIGNS AND SYMPTOMS)
Pt is a poor historian. says he is doing well. says he is in the hospital because his glucose was low. pt doesn't know why. denies depressive sxs. no cortes. no psychosis. no anxiety. sleeping well. good appetite.

## 2017-05-18 NOTE — PROGRESS NOTE ADULT - SUBJECTIVE AND OBJECTIVE BOX
INTERVAL HPI/OVERNIGHT EVENTS: ***  patient had D50x3 overnight for low blood sugars. received 3 doses of D50 in ED.    PRESSORS: [ ] YES [ x] NO  WHICH:    ANTIBIOTICS:none                  DATE STARTED:  ANTIBIOTICS:                  DATE STARTED:  ANTIBIOTICS:                  DATE STARTED:    Antimicrobial:    Cardiovascular:  spironolactone 25milliGRAM(s) Oral daily  losartan 25milliGRAM(s) Oral daily  isosorbide   mononitrate ER Tablet (IMDUR) 30milliGRAM(s) Oral daily  metoprolol succinate ER 25milliGRAM(s) Oral daily  furosemide    Tablet 40milliGRAM(s) Oral daily  ranolazine 500milliGRAM(s) Oral two times a day    Pulmonary:  ALBUTerol    90 MICROgram(s) HFA Inhaler 2Puff(s) Inhalation every 6 hours PRN    Hematalogic:  aspirin enteric coated 81milliGRAM(s) Oral daily  clopidogrel Tablet 75milliGRAM(s) Oral daily  enoxaparin Injectable 40milliGRAM(s) SubCutaneous daily    Other:  topiramate 50milliGRAM(s) Oral two times a day  OXcarbazepine 600milliGRAM(s) Oral daily  FLUoxetine 40milliGRAM(s) Oral daily  atorvastatin 10milliGRAM(s) Oral at bedtime  dextrose 5%. 1000milliLiter(s) IV Continuous <Continuous>  dextrose Gel 1Dose(s) Oral once PRN  dextrose 50% Injectable 12.5Gram(s) IV Push once  dextrose 50% Injectable 25Gram(s) IV Push once  dextrose 50% Injectable 25Gram(s) IV Push once  glucagon  Injectable 1milliGRAM(s) IntraMuscular once PRN  pantoprazole    Tablet 40milliGRAM(s) Oral before breakfast  nicotine -  14 mG/24Hr(s) Patch 1patch Transdermal daily  dextrose 10%. 1000milliLiter(s) IV Continuous <Continuous>  dextrose 50% Injectable 25milliLiter(s) IV Push once  dextrose 5%. 1000milliLiter(s) IV Continuous <Continuous>    spironolactone 25milliGRAM(s) Oral daily  aspirin enteric coated 81milliGRAM(s) Oral daily  losartan 25milliGRAM(s) Oral daily  isosorbide   mononitrate ER Tablet (IMDUR) 30milliGRAM(s) Oral daily  topiramate 50milliGRAM(s) Oral two times a day  OXcarbazepine 600milliGRAM(s) Oral daily  FLUoxetine 40milliGRAM(s) Oral daily  atorvastatin 10milliGRAM(s) Oral at bedtime  dextrose 5%. 1000milliLiter(s) IV Continuous <Continuous>  dextrose Gel 1Dose(s) Oral once PRN  dextrose 50% Injectable 12.5Gram(s) IV Push once  dextrose 50% Injectable 25Gram(s) IV Push once  dextrose 50% Injectable 25Gram(s) IV Push once  glucagon  Injectable 1milliGRAM(s) IntraMuscular once PRN  clopidogrel Tablet 75milliGRAM(s) Oral daily  metoprolol succinate ER 25milliGRAM(s) Oral daily  ALBUTerol    90 MICROgram(s) HFA Inhaler 2Puff(s) Inhalation every 6 hours PRN  furosemide    Tablet 40milliGRAM(s) Oral daily  enoxaparin Injectable 40milliGRAM(s) SubCutaneous daily  pantoprazole    Tablet 40milliGRAM(s) Oral before breakfast  ranolazine 500milliGRAM(s) Oral two times a day  nicotine -  14 mG/24Hr(s) Patch 1patch Transdermal daily  dextrose 10%. 1000milliLiter(s) IV Continuous <Continuous>  dextrose 50% Injectable 25milliLiter(s) IV Push once  dextrose 5%. 1000milliLiter(s) IV Continuous <Continuous>    Drug Dosing Weight  Height (cm): 175.3 (18 May 2017 01:43)  Weight (kg): 138.8 (18 May 2017 01:43)  BMI (kg/m2): 45.2 (18 May 2017 01:43)  BSA (m2): 2.48 (18 May 2017 01:43)    CENTRAL LINE: [ ] YES [ x] NO  LOCATION:   DATE INSERTED:  REMOVE: [ ] YES [ ] NO  EXPLAIN:    SEE: [ ] YES [x ] NO    DATE INSERTED:  REMOVE:  [ ] YES [ ] NO  EXPLAIN:    A-LINE:  [ ] YES [ x] NO  LOCATION:   DATE INSERTED:  REMOVE:  [ ] YES [ ] NO  EXPLAIN:    PMH -reviewed admission note, no change since admission  Heart faliure: acute [ ] chronic [ ] acute or chronic [ ] diastolic [ ] systolic [ ] combied systolic and diastolic[ ]  VIOLA: ATN[ ] renal medullary necrosis [ ] CKD I [ ]CKDII [ ]CKD III [ ]CKD IV [ ]CKD V [ ]Other pathological lesions [ ]  Abdominal Nutrition Status: malnutrition [ ] cachexia [ ] morbid obesity/BMI=40 [ ] Supplement ordered [___________]     ICU Vital Signs Last 24 Hrs  T(C): 36.1, Max: 36.9 ( @ 20:25)  T(F): 96.9, Max: 98.4 ( @ 20:25)  HR: 68 (65 - 78)  BP: 119/58 (119/56 - 143/77)  BP(mean): 70 (70 - 82)  ABP: --  ABP(mean): --  RR: 20 (18 - 22)  SpO2: 98% (89% - 98%)      ABG - ( 17 May 2017 21:59 )  pH: 7.42  /  pCO2: 42    /  pO2: 59    / HCO3: 26    / Base Excess: 2.3   /  SaO2: 91                          PHYSICAL EXAM:    GENERAL:   HEAD: atraumatic, normocephalic   EYES: PERRLA, white sclera.   ENMT: nasal mucosa- Oral cavity-   NECK: supple, JVD/ no JVD, thyroid-   LYMPH: no palpable lymph nodes     SKIN: warm, dry   CHEST/LUNG: ET tube: size        cm at lip. No Chest deformity , no chest tenderness. bilateral breath sounds,no  adventitious sounds  HEART: RRR, no m/r/g   ABDOMEN: soft, nontender, nondistended; bowel sounds.  :  EXTREMITIES: +1 non-pitting edema, no cyanosis, no clubbing.  NEURO: AA0X , mood/ affect-, no focal neuro deficits        LABS:  CBC Full  -  ( 17 May 2017 20:28 )  WBC Count : 14.0 K/uL  Hemoglobin : 16.0 g/dL  Hematocrit : 50.5 %  Platelet Count - Automated : 257 K/uL  Mean Cell Volume : 97.2 fl  Mean Cell Hemoglobin : 30.8 pg  Mean Cell Hemoglobin Concentration : 31.7 gm/dL  Auto Neutrophil # : 10.6 K/uL  Auto Lymphocyte # : 2.7 K/uL  Auto Monocyte # : 0.6 K/uL  Auto Eosinophil # : 0.1 K/uL  Auto Basophil # : 0.1 K/uL  Auto Neutrophil % : 75.4 %  Auto Lymphocyte % : 19.4 %  Auto Monocyte % : 3.9 %  Auto Eosinophil % : 0.8 %  Auto Basophil % : 0.4 %        144  |  111<H>  |  8   ----------------------------<  102<H>  3.2<L>   |  27  |  0.93    Ca    8.5      17 May 2017 23:45  Mg     2.2         TPro  7.6  /  Alb  3.2<L>  /  TBili  0.1<L>  /  DBili  x   /  AST  20  /  ALT  26  /  AlkPhos  113        Urinalysis Basic - ( 17 May 2017 23:45 )    Color: Yellow / Appearance: Clear / S.020 / pH: x  Gluc: x / Ketone: Trace  / Bili: Negative / Urobili: Negative   Blood: x / Protein: 30 mg/dL / Nitrite: Negative   Leuk Esterase: Trace / RBC: 0-2 /HPF / WBC 0-2 /HPF   Sq Epi: x / Non Sq Epi: Occasional / Bacteria: Negative /HPF          RADIOLOGY & ADDITIONAL STUDIES REVIEWED:  ***    [ x]GOALS OF CARE DISCUSSION WITH PATIENT/FAMILY/PROXY:    CRITICAL CARE TIME SPENT: 35 minutes INTERVAL HPI/OVERNIGHT EVENTS: ***  patient had D50x3 overnight for low blood sugars. received 3 doses of D50 in ED.    PRESSORS: [ ] YES [ x] NO  WHICH:    ANTIBIOTICS:none                  DATE STARTED:  ANTIBIOTICS:                  DATE STARTED:  ANTIBIOTICS:                  DATE STARTED:    Antimicrobial:    Cardiovascular:  spironolactone 25milliGRAM(s) Oral daily  losartan 25milliGRAM(s) Oral daily  isosorbide   mononitrate ER Tablet (IMDUR) 30milliGRAM(s) Oral daily  metoprolol succinate ER 25milliGRAM(s) Oral daily  furosemide    Tablet 40milliGRAM(s) Oral daily  ranolazine 500milliGRAM(s) Oral two times a day    Pulmonary:  ALBUTerol    90 MICROgram(s) HFA Inhaler 2Puff(s) Inhalation every 6 hours PRN    Hematalogic:  aspirin enteric coated 81milliGRAM(s) Oral daily  clopidogrel Tablet 75milliGRAM(s) Oral daily  enoxaparin Injectable 40milliGRAM(s) SubCutaneous daily    Other:  topiramate 50milliGRAM(s) Oral two times a day  OXcarbazepine 600milliGRAM(s) Oral daily  FLUoxetine 40milliGRAM(s) Oral daily  atorvastatin 10milliGRAM(s) Oral at bedtime  dextrose 5%. 1000milliLiter(s) IV Continuous <Continuous>  dextrose Gel 1Dose(s) Oral once PRN  dextrose 50% Injectable 12.5Gram(s) IV Push once  dextrose 50% Injectable 25Gram(s) IV Push once  dextrose 50% Injectable 25Gram(s) IV Push once  glucagon  Injectable 1milliGRAM(s) IntraMuscular once PRN  pantoprazole    Tablet 40milliGRAM(s) Oral before breakfast  nicotine -  14 mG/24Hr(s) Patch 1patch Transdermal daily  dextrose 10%. 1000milliLiter(s) IV Continuous <Continuous>  dextrose 50% Injectable 25milliLiter(s) IV Push once  dextrose 5%. 1000milliLiter(s) IV Continuous <Continuous>    spironolactone 25milliGRAM(s) Oral daily  aspirin enteric coated 81milliGRAM(s) Oral daily  losartan 25milliGRAM(s) Oral daily  isosorbide   mononitrate ER Tablet (IMDUR) 30milliGRAM(s) Oral daily  topiramate 50milliGRAM(s) Oral two times a day  OXcarbazepine 600milliGRAM(s) Oral daily  FLUoxetine 40milliGRAM(s) Oral daily  atorvastatin 10milliGRAM(s) Oral at bedtime  dextrose 5%. 1000milliLiter(s) IV Continuous <Continuous>  dextrose Gel 1Dose(s) Oral once PRN  dextrose 50% Injectable 12.5Gram(s) IV Push once  dextrose 50% Injectable 25Gram(s) IV Push once  dextrose 50% Injectable 25Gram(s) IV Push once  glucagon  Injectable 1milliGRAM(s) IntraMuscular once PRN  clopidogrel Tablet 75milliGRAM(s) Oral daily  metoprolol succinate ER 25milliGRAM(s) Oral daily  ALBUTerol    90 MICROgram(s) HFA Inhaler 2Puff(s) Inhalation every 6 hours PRN  furosemide    Tablet 40milliGRAM(s) Oral daily  enoxaparin Injectable 40milliGRAM(s) SubCutaneous daily  pantoprazole    Tablet 40milliGRAM(s) Oral before breakfast  ranolazine 500milliGRAM(s) Oral two times a day  nicotine -  14 mG/24Hr(s) Patch 1patch Transdermal daily  dextrose 10%. 1000milliLiter(s) IV Continuous <Continuous>  dextrose 50% Injectable 25milliLiter(s) IV Push once  dextrose 5%. 1000milliLiter(s) IV Continuous <Continuous>    Drug Dosing Weight  Height (cm): 175.3 (18 May 2017 01:43)  Weight (kg): 138.8 (18 May 2017 01:43)  BMI (kg/m2): 45.2 (18 May 2017 01:43)  BSA (m2): 2.48 (18 May 2017 01:43)    CENTRAL LINE: [ ] YES [ x] NO  LOCATION:   DATE INSERTED:  REMOVE: [ ] YES [ ] NO  EXPLAIN:    SEE: [ ] YES [x ] NO    DATE INSERTED:  REMOVE:  [ ] YES [ ] NO  EXPLAIN:    A-LINE:  [ ] YES [ x] NO  LOCATION:   DATE INSERTED:  REMOVE:  [ ] YES [ ] NO  EXPLAIN:    PMH -reviewed admission note, no change since admission  Heart faliure: acute [ ] chronic [ ] acute or chronic [ ] diastolic [ ] systolic [ ] combied systolic and diastolic[ ]  VIOLA: ATN[ ] renal medullary necrosis [ ] CKD I [ ]CKDII [ ]CKD III [ ]CKD IV [ ]CKD V [ ]Other pathological lesions [ ]  Abdominal Nutrition Status: malnutrition [ ] cachexia [ ] morbid obesity/BMI=40 [ ] Supplement ordered [___________]     ICU Vital Signs Last 24 Hrs  T(C): 36.1, Max: 36.9 ( @ 20:25)  T(F): 96.9, Max: 98.4 ( @ 20:25)  HR: 68 (65 - 78)  BP: 119/58 (119/56 - 143/77)  BP(mean): 70 (70 - 82)  ABP: --  ABP(mean): --  RR: 20 (18 - 22)  SpO2: 98% (89% - 98%)      ABG - ( 17 May 2017 21:59 )  pH: 7.42  /  pCO2: 42    /  pO2: 59    / HCO3: 26    / Base Excess: 2.3   /  SaO2: 91                          PHYSICAL EXAM:    GENERAL: patient is lying comfortably in bed.  HEAD: atraumatic, normocephalic   EYES: PERRLA, white sclera.   ENMT: nasal mucosa and Oral cavity- normal  NECK: supple, no JVD, thyroid- normal  LYMPH: no palpable lymph nodes     SKIN: normal, tatoos noted over both upper extremities.  CHEST/LUNG:  No Chest deformity , no chest tenderness. bilateral breath sounds,no  adventitious sounds  HEART: RRR, no m/r/g   ABDOMEN: soft, nontender, nondistended; bowel sounds.  : none  EXTREMITIES: no pedal edema, no cyanosis, no clubbing.  NEURO: AA0X3 , mood/ affect- normal, no focal neuro deficits        LABS:  CBC Full  -  ( 17 May 2017 20:28 )  WBC Count : 14.0 K/uL  Hemoglobin : 16.0 g/dL  Hematocrit : 50.5 %  Platelet Count - Automated : 257 K/uL  Mean Cell Volume : 97.2 fl  Mean Cell Hemoglobin : 30.8 pg  Mean Cell Hemoglobin Concentration : 31.7 gm/dL  Auto Neutrophil # : 10.6 K/uL  Auto Lymphocyte # : 2.7 K/uL  Auto Monocyte # : 0.6 K/uL  Auto Eosinophil # : 0.1 K/uL  Auto Basophil # : 0.1 K/uL  Auto Neutrophil % : 75.4 %  Auto Lymphocyte % : 19.4 %  Auto Monocyte % : 3.9 %  Auto Eosinophil % : 0.8 %  Auto Basophil % : 0.4 %        144  |  111<H>  |  8   ----------------------------<  102<H>  3.2<L>   |  27  |  0.93    Ca    8.5      17 May 2017 23:45  Mg     2.2         TPro  7.6  /  Alb  3.2<L>  /  TBili  0.1<L>  /  DBili  x   /  AST  20  /  ALT  26  /  AlkPhos  113        Urinalysis Basic - ( 17 May 2017 23:45 )    Color: Yellow / Appearance: Clear / S.020 / pH: x  Gluc: x / Ketone: Trace  / Bili: Negative / Urobili: Negative   Blood: x / Protein: 30 mg/dL / Nitrite: Negative   Leuk Esterase: Trace / RBC: 0-2 /HPF / WBC 0-2 /HPF   Sq Epi: x / Non Sq Epi: Occasional / Bacteria: Negative /HPF          RADIOLOGY & ADDITIONAL STUDIES REVIEWED:  ***    [ x]GOALS OF CARE DISCUSSION WITH PATIENT/FAMILY/PROXY:    CRITICAL CARE TIME SPENT: 35 minutes

## 2017-05-18 NOTE — BEHAVIORAL HEALTH ASSESSMENT NOTE - NSBHCHARTREVIEWIMAGING_PSY_A_CORE FT
EXAM:  CT BRAIN                            PROCEDURE DATE:  05/17/2017        INTERPRETATION:  .    CLINICAL INFORMATION: Weakness    TECHNIQUE: Multiple axial CT images of the head were obtained without   contrast. Sagittal and coronal reconstructed images were acquired from   the source data.    COMPARISON: No prior CT studies of the brain are available for comparison   at this institution.    FINDINGS: There is no acute intracranial hemorrhage, mass effect, midline   shift, herniation, extra-axial fluid collection, or hydrocephalus.    There is diffuse cerebral volume loss with prominence of the sulci,   fissures, and cisternal spaces which is normal for the patient's age.   There is mild periventricular white matter hypoattenuation statistically   compatible with microvascular changes given calcific atherosclerotic   disease of the intracranial arteries. There is a chronic lacunar infarct   within the right caudate.    The paranasal sinuses and mastoid air cells are clear. The calvarium is   intact. The imaged orbits are unremarkable.    IMPRESSION: No acute intracranial hemorrhage, mass effect, or midline   shift.    Microvascular disease.

## 2017-05-18 NOTE — BEHAVIORAL HEALTH ASSESSMENT NOTE - NSBHCONSULTFOLLOWDETAILS_PSY_A_CORE FT
pt is not on acute risk to self or others. pt will need help managing his insulin as he seems with limited cognition and unable to provide history and meds he takes.

## 2017-05-18 NOTE — BEHAVIORAL HEALTH ASSESSMENT NOTE - NSBHREFERDETAILS_PSY_A_CORE_FT
pt with h/o bipolar disorder presents with hypoglycemia in context of what seems poor management of insulin.

## 2017-05-18 NOTE — PROGRESS NOTE ADULT - ASSESSMENT
57 obese male from home PMH of CAD with stent x1( august 2016), CHF, arthritis, HTN, DM, JONAH (on BIPAP), bipolar disorder presented to the ED with complaint of weakness and hypoglycemia for last 2 days. Concern for excessive insulin administration vs insulin sensitivity with recurrent hypoglycemic episodes while on insulin. Admitted to ICU for close monitoring of accuchecks.    1) CNS- AAOX3, no sedatives. Ct head negative for acute pathology. Patient supposed to have psych issues in the past with inpatient admission in past. Will continue fluoxetine, topiramate and trileptal.    2) Endocrine-  -Hypoglycemia.    Likely secondary to excessive insulin administration vs insulin sensitivity with recurrent hypoglycemic episodes while on insulin.  s/p 6 doses of D50 total.  Accuchecks- [id number]  Will continue D5 at 200cc/hr with D10 at 30cc/hr   insulin and oral antihypoglycemic medications are held  will monitor blood glucose q 1 hr until blood glucose normalizes for >2 readings.  endocrinology, Dr. Aguiar  consistent carbohydrate diet   -continue hypoglycemia protocol  -Ct head negative  Will f/u insulin level, c peptide, and A1c.     Patient was found to be Hypokalemia likely secondary to excess insulin intake on admission. K level 2.9 on admission. Patient received 40meq PO and 10meq x 3 IV-> 3.2, this AM     3)CVS- CAD s/p stent  Echo in march 2017 s/o EF of 55-60% with G 1 DD.  Will continue aspirin, Plavix, metoprolol, imdur, lipitor, ranexa, spironolactone.    4)Respiratory-  JONAH   Patient has JONAH, noncompliant with BIPAP.   Patient counseled on smoking cessation , started on nicotine patch.     5)ID- no antibiotics    6) Renal- No renal issues  7)Skin- no skin lesions.    8)Hem-onch: H/H stable.    9) GI: on protonix and DASH diet.    9) DVT prophylaxis.- on lovenox s/c 57 obese male from home PMH of CAD with stent x1( august 2016), CHF, arthritis, HTN, DM, JONAH (on BIPAP), bipolar disorder presented to the ED with complaint of weakness and hypoglycemia for last 2 days. Concern for excessive insulin administration vs insulin sensitivity with recurrent hypoglycemic episodes while on insulin. Admitted to ICU for close monitoring of accuchecks.    1) CNS- AAOX3, no sedatives. Ct head negative for acute pathology. Patient supposed to have psych issues in the past with inpatient admission in past. Will continue fluoxetine, topiramate and trileptal. will get psych consult- Dr. choi was consulted    2) Endocrine-  -Hypoglycemia.    Likely secondary to excessive insulin administration vs insulin sensitivity with recurrent hypoglycemic episodes while on insulin.  s/p 6 doses of D50 total.  Accuchecks- [id number]  Will continue D5 at 200cc/hr with D10 at 30cc/hr, since finger sticks are still on the lower side, will give D10% at 30 cc/hr in 2 IV lines on both limbs.  insulin and oral antihypoglycemic medications are held  will monitor blood glucose q 1 hr until blood glucose normalizes for >2 readings.  endocrinology, Dr. Aguiar recommended  to obtain cortisol levels if persistant hypoglycemia as well as repeat c-peptide and insulin levels also if patient is hypotensive.  continue regular diet.  -continue hypoglycemia protocol  -Ct head negative for acute pathology.  -Will f/u insulin level, c peptide, and A1c . insulin levels elevated to 77.   Patient was found to be Hypokalemia likely secondary to excess insulin intake on admission. K level 2.9 on admission. Patient received 40meq PO and 10meq x 3 IV-> 3.2, this AM, K levels are normalized    3)CVS- CAD s/p stent  Echo in march 2017 s/o EF of 55-60% with G 1 DD.  Will continue aspirin, Plavix, metoprolol, imdur, lipitor, ranexa, spironolactone.    4)Respiratory-  JONAH   Patient has JONAH, noncompliant with BIPAP.   Patient counseled on smoking cessation , started on nicotine patch.     5)ID- no antibiotics    6) Renal- No renal issues  7)Skin- no skin lesions.    8)Hem-onch: H/H stable.    9) GI: on protonix and DASH diet.    9) DVT prophylaxis.- on lovenox s/c

## 2017-05-18 NOTE — CONSULT NOTE ADULT - SUBJECTIVE AND OBJECTIVE BOX
Patient is a 56 yo Male who presents with a chief complaint of hypoglycemia (17 May 2017 23:55)      HPI:  56 yo morbidly obese M from home, lives with wife, with PMHx Bipolar Disorder, CAD s/p stent x 1, CHF, arthritis, HTN, DM II, JONAH on BIPAP, recently discharged 3/13/17 after hospitalization for hypoglycemia and instructed to d/c Lantus 30 U sc AM and c/w Metformin po bid, however, pt c/w prior diabetic medications with last dosages on Tuesday 3/16/17 AM, at which point pt c/o generalized weakness, chills, malaise, and hypoglycemia with home Accu-Cheks ~ 50-70 with intermittent improvement s/p ice cream.  In the ED, pt noted with persistent hypoglycemia in ~40's, s/p multiple D50 and started on D10 IVF.       PAST MEDICAL & SURGICAL HISTORY:  Smoker  Arthritis  CAD (coronary artery disease)  JONAH treated with BiPAP  Congestive heart failure  Abdominal Obesity  Diabetes Mellitus Type 2 in Obese  Umbilical Hernia without Obstruction or Gangrene: s/p repair         MEDICATIONS  (STANDING):  spironolactone 25milliGRAM(s) Oral daily  aspirin enteric coated 81milliGRAM(s) Oral daily  losartan 25milliGRAM(s) Oral daily  isosorbide   mononitrate ER Tablet (IMDUR) 30milliGRAM(s) Oral daily  topiramate 50milliGRAM(s) Oral two times a day  OXcarbazepine 600milliGRAM(s) Oral daily  FLUoxetine 40milliGRAM(s) Oral daily  atorvastatin 10milliGRAM(s) Oral at bedtime  dextrose 5%. 1000milliLiter(s) IV Continuous <Continuous>  dextrose 50% Injectable 12.5Gram(s) IV Push once  dextrose 50% Injectable 25Gram(s) IV Push once  dextrose 50% Injectable 25Gram(s) IV Push once  clopidogrel Tablet 75milliGRAM(s) Oral daily  metoprolol succinate ER 25milliGRAM(s) Oral daily  furosemide    Tablet 40milliGRAM(s) Oral daily  enoxaparin Injectable 40milliGRAM(s) SubCutaneous daily  pantoprazole    Tablet 40milliGRAM(s) Oral before breakfast  ranolazine 500milliGRAM(s) Oral two times a day  nicotine -  14 mG/24Hr(s) Patch 1patch Transdermal daily  dextrose 10%. 1000milliLiter(s) IV Continuous <Continuous>  dextrose 5%. 1000milliLiter(s) IV Continuous <Continuous>  potassium acid phosphate/sodium acid phosphate tablet (K-PHOS No. 2) 1Tablet(s) Oral four times a day with meals    MEDICATIONS  (PRN):  dextrose Gel 1Dose(s) Oral once PRN Blood Glucose LESS THAN 70 milliGRAM(s)/deciLiter  glucagon  Injectable 1milliGRAM(s) IntraMuscular once PRN Glucose <70 milliGRAM(s)/deciLiter  ALBUTerol    90 MICROgram(s) HFA Inhaler 2Puff(s) Inhalation every 6 hours PRN Shortness of Breath and/or Wheezing      FAMILY HISTORY:  Family history of MI (myocardial infarction)      SOCIAL HISTORY: Denies IVDA, alcohol ingestion, or illicit substance use.       REVIEW OF SYSTEMS:  CONSTITUTIONAL: (+) Malaise. No fever, weight loss, or fatigue  EYES: No eye pain, visual disturbances, or discharge  ENT:  No difficulty hearing, tinnitus, vertigo; No sinus or throat pain  NECK: No pain or stiffness  RESPIRATORY: No cough, wheezing, chills or hemoptysis; No Shortness of Breath  CARDIOVASCULAR: No chest pain, palpitations, passing out, dizziness, or leg swelling  GASTROINTESTINAL: No abdominal or epigastric pain. No nausea, vomiting, or hematemesis; No diarrhea or constipation. No melena or hematochezia.  GENITOURINARY: No dysuria, frequency, hematuria, or incontinence  NEUROLOGICAL: (+) Improving tremors/chills. No headaches, memory loss, loss of strength, numbness  SKIN: No itching, burning, rashes, or lesions   LYMPH Nodes: No enlarged glands  ENDOCRINE: No heat or cold intolerance; No hair loss  MUSCULOSKELETAL: (+) Generalized weakness. No joint pain or swelling; No muscle, back, or extremity pain  PSYCHIATRIC: No depression, anxiety, mood swings, or difficulty sleeping  HEME/LYMPH: No easy bruising, or bleeding gums  ALLERGY AND IMMUNOLOGIC: No hives or eczema	        Vital Signs Last 24 Hrs  T(C): 36.1, Max: 36.9 (05-17 @ 20:25)  T(F): 96.9, Max: 98.4 (05-17 @ 20:25)  HR: 63 (63 - 78)  BP: 126/83 (86/64 - 143/77)  BP(mean): 93 (70 - 93)  RR: 19 (18 - 22)  SpO2: 96% (89% - 99%)      Constitutional: WD, WN, NAD    HEENT: NC, AT    Neck:  No JVD, bruits or thyromegaly    Respiratory:  Clear without rales or rhonchi    Cardiovascular:  RR without murmur, rub or gallop.    Gastrointestinal: Obese, Soft without hepatosplenomegaly. Normoactive bowel sounds.      Extremities: without cyanosis, clubbing or edema.    Neurological:  Oriented   x   3   . No gross sensory or motor defects.        LABS:                        14.5   12.4  )-----------( 188      ( 18 May 2017 06:46 )             45.8         139  |  109<H>  |  7   ----------------------------<  150<H>  4.7   |  24  |  0.80    Ca    7.5<L>      18 May 2017 06:46  Phos  2.0       Mg     1.9         TPro  6.0  /  Alb  2.5<L>  /  TBili  0.2  /  DBili  x   /  AST  15  /  ALT  21  /  AlkPhos  86  18          Urinalysis Basic - ( 17 May 2017 23:45 )    Color: Yellow / Appearance: Clear / S.020 / pH: x  Gluc: x / Ketone: Trace  / Bili: Negative / Urobili: Negative   Blood: x / Protein: 30 mg/dL / Nitrite: Negative   Leuk Esterase: Trace / RBC: 0-2 /HPF / WBC 0-2 /HPF   Sq Epi: x / Non Sq Epi: Occasional / Bacteria: Negative /HPF      CAPILLARY BLOOD GLUCOSE  111 (18 May 2017 07:00)  59 (18 May 2017 06:00)  60 (18 May 2017 05:00)  103 (18 May 2017 04:00)  77 (18 May 2017 03:00)  98 (18 May 2017 02:00)  161 (18 May 2017 01:43)  207 (18 May 2017 00:44)  76 (17 May 2017 23:15)  45 (17 May 2017 22:28)  99 (17 May 2017 20:42)  49 (17 May 2017 19:49)      RADIOLOGY & ADDITIONAL STUDIES:  EXAM:  CHEST-PORTABLE STAT  IMPRESSION:  Mild diffuse interstitial prominence. Patient is a 56 yo Male who presents with a chief complaint of hypoglycemia (17 May 2017 23:55)      HPI:  56 yo morbidly obese M from home, lives with wife, with PMHx Bipolar Disorder, CAD s/p stent x 1, CHF, arthritis, HTN, DM II, JONAH on BIPAP, recently discharged 3/13/17 after hospitalization for hypoglycemia and instructed to d/c Lantus 30 U sc AM and c/w Metformin po bid. As outpatient, accu-cheks uncontrolled above 300 and started on Soliqua 30 U qd.  Pt's last dosages of Soliqua and Metformin on Tuesday 3/16/17 AM, at which point pt c/o generalized weakness, chills, malaise, and hypoglycemia with home Accu-Cheks ~ 50-70 with intermittent improvement s/p ice cream.  In the ED, pt noted with persistent hypoglycemia in ~40's, s/p multiple D50 and started on D10 IVF.       PAST MEDICAL & SURGICAL HISTORY:  Smoker  Arthritis  CAD (coronary artery disease)  JONAH treated with BiPAP  Congestive heart failure  Abdominal Obesity  Diabetes Mellitus Type 2 in Obese  Umbilical Hernia without Obstruction or Gangrene: s/p repair         MEDICATIONS  (STANDING):  spironolactone 25milliGRAM(s) Oral daily  aspirin enteric coated 81milliGRAM(s) Oral daily  losartan 25milliGRAM(s) Oral daily  isosorbide   mononitrate ER Tablet (IMDUR) 30milliGRAM(s) Oral daily  topiramate 50milliGRAM(s) Oral two times a day  OXcarbazepine 600milliGRAM(s) Oral daily  FLUoxetine 40milliGRAM(s) Oral daily  atorvastatin 10milliGRAM(s) Oral at bedtime  dextrose 5%. 1000milliLiter(s) IV Continuous <Continuous>  dextrose 50% Injectable 12.5Gram(s) IV Push once  dextrose 50% Injectable 25Gram(s) IV Push once  dextrose 50% Injectable 25Gram(s) IV Push once  clopidogrel Tablet 75milliGRAM(s) Oral daily  metoprolol succinate ER 25milliGRAM(s) Oral daily  furosemide    Tablet 40milliGRAM(s) Oral daily  enoxaparin Injectable 40milliGRAM(s) SubCutaneous daily  pantoprazole    Tablet 40milliGRAM(s) Oral before breakfast  ranolazine 500milliGRAM(s) Oral two times a day  nicotine -  14 mG/24Hr(s) Patch 1patch Transdermal daily  dextrose 10%. 1000milliLiter(s) IV Continuous <Continuous>  dextrose 5%. 1000milliLiter(s) IV Continuous <Continuous>  potassium acid phosphate/sodium acid phosphate tablet (K-PHOS No. 2) 1Tablet(s) Oral four times a day with meals    MEDICATIONS  (PRN):  dextrose Gel 1Dose(s) Oral once PRN Blood Glucose LESS THAN 70 milliGRAM(s)/deciLiter  glucagon  Injectable 1milliGRAM(s) IntraMuscular once PRN Glucose <70 milliGRAM(s)/deciLiter  ALBUTerol    90 MICROgram(s) HFA Inhaler 2Puff(s) Inhalation every 6 hours PRN Shortness of Breath and/or Wheezing      FAMILY HISTORY:  Family history of MI (myocardial infarction)      SOCIAL HISTORY: Denies IVDA, alcohol ingestion, or illicit substance use.       REVIEW OF SYSTEMS:  CONSTITUTIONAL: (+) Malaise. No fever, weight loss, or fatigue  EYES: No eye pain, visual disturbances, or discharge  ENT:  No difficulty hearing, tinnitus, vertigo; No sinus or throat pain  NECK: No pain or stiffness  RESPIRATORY: No cough, wheezing, chills or hemoptysis; No Shortness of Breath  CARDIOVASCULAR: No chest pain, palpitations, passing out, dizziness, or leg swelling  GASTROINTESTINAL: No abdominal or epigastric pain. No nausea, vomiting, or hematemesis; No diarrhea or constipation. No melena or hematochezia.  GENITOURINARY: No dysuria, frequency, hematuria, or incontinence  NEUROLOGICAL: (+) Improving tremors/chills. No headaches, memory loss, loss of strength, numbness  SKIN: No itching, burning, rashes, or lesions   LYMPH Nodes: No enlarged glands  ENDOCRINE: No heat or cold intolerance; No hair loss  MUSCULOSKELETAL: (+) Generalized weakness. No joint pain or swelling; No muscle, back, or extremity pain  PSYCHIATRIC: No depression, anxiety, mood swings, or difficulty sleeping  HEME/LYMPH: No easy bruising, or bleeding gums  ALLERGY AND IMMUNOLOGIC: No hives or eczema	        Vital Signs Last 24 Hrs  T(C): 36.1, Max: 36.9 ( @ 20:25)  T(F): 96.9, Max: 98.4 ( @ 20:25)  HR: 63 (63 - 78)  BP: 126/83 (86/64 - 143/77)  BP(mean): 93 (70 - 93)  RR: 19 (18 - 22)  SpO2: 96% (89% - 99%)      Constitutional: WD, WN, NAD    HEENT: NC, AT    Neck:  No JVD, bruits or thyromegaly    Respiratory:  Clear without rales or rhonchi    Cardiovascular:  RR without murmur, rub or gallop.    Gastrointestinal: Obese, Soft without hepatosplenomegaly. Normoactive bowel sounds.      Extremities: without cyanosis, clubbing or edema.    Neurological:  Oriented   x   3   . No gross sensory or motor defects.        LABS:                        14.5   12.4  )-----------( 188      ( 18 May 2017 06:46 )             45.8     18    139  |  109<H>  |  7   ----------------------------<  150<H>  4.7   |  24  |  0.80    Ca    7.5<L>      18 May 2017 06:46  Phos  2.0       Mg     1.9         TPro  6.0  /  Alb  2.5<L>  /  TBili  0.2  /  DBili  x   /  AST  15  /  ALT  21  /  AlkPhos  86  18          Urinalysis Basic - ( 17 May 2017 23:45 )    Color: Yellow / Appearance: Clear / S.020 / pH: x  Gluc: x / Ketone: Trace  / Bili: Negative / Urobili: Negative   Blood: x / Protein: 30 mg/dL / Nitrite: Negative   Leuk Esterase: Trace / RBC: 0-2 /HPF / WBC 0-2 /HPF   Sq Epi: x / Non Sq Epi: Occasional / Bacteria: Negative /HPF      CAPILLARY BLOOD GLUCOSE  111 (18 May 2017 07:00)  59 (18 May 2017 06:00)  60 (18 May 2017 05:00)  103 (18 May 2017 04:00)  77 (18 May 2017 03:00)  98 (18 May 2017 02:00)  161 (18 May 2017 01:43)  207 (18 May 2017 00:44)  76 (17 May 2017 23:15)  45 (17 May 2017 22:28)  99 (17 May 2017 20:42)  49 (17 May 2017 19:49)      RADIOLOGY & ADDITIONAL STUDIES:  EXAM:  CHEST-PORTABLE STAT  IMPRESSION:  Mild diffuse interstitial prominence. Patient is a 56 yo Male who presents with a chief complaint of hypoglycemia (17 May 2017 23:55)      HPI:  1.	56 yo morbidly obese M from home, lives with wife, with PMHx Bipolar Disorder, CAD s/p stent x 1, CHF, arthritis, HTN, DM II, JONAH on BIPAP, recently discharged 3/13/17 after hospitalization for hypoglycemia and instructed to d/c Lantus 30 U sc AM and c/w Metformin po bid. As outpatient, accu-cheks uncontrolled above 300 and started on Soliqua 30 U qd.  Pt's last dosages of Soliqua and Metformin on Tuesday 3/16/17 AM, at which point pt c/o generalized weakness, chills, malaise, and hypoglycemia with home Accu-Cheks ~ 50-70 with intermittent improvement s/p ice cream.  In the ED, pt noted with persistent hypoglycemia in ~40's, s/p multiple D50 and started on D10 IVF. Denies injecting extra doses, eating well. Pt has a cat with dm on insulin       PAST MEDICAL & SURGICAL HISTORY:  Smoker  Arthritis  CAD (coronary artery disease)  JONAH treated with BiPAP  Congestive heart failure  Abdominal Obesity  Diabetes Mellitus Type 2 in Obese  Umbilical Hernia without Obstruction or Gangrene: s/p repair         MEDICATIONS  (STANDING):  spironolactone 25milliGRAM(s) Oral daily  aspirin enteric coated 81milliGRAM(s) Oral daily  losartan 25milliGRAM(s) Oral daily  isosorbide   mononitrate ER Tablet (IMDUR) 30milliGRAM(s) Oral daily  topiramate 50milliGRAM(s) Oral two times a day  OXcarbazepine 600milliGRAM(s) Oral daily  FLUoxetine 40milliGRAM(s) Oral daily  atorvastatin 10milliGRAM(s) Oral at bedtime  dextrose 5%. 1000milliLiter(s) IV Continuous <Continuous>  dextrose 50% Injectable 12.5Gram(s) IV Push once  dextrose 50% Injectable 25Gram(s) IV Push once  dextrose 50% Injectable 25Gram(s) IV Push once  clopidogrel Tablet 75milliGRAM(s) Oral daily  metoprolol succinate ER 25milliGRAM(s) Oral daily  furosemide    Tablet 40milliGRAM(s) Oral daily  enoxaparin Injectable 40milliGRAM(s) SubCutaneous daily  pantoprazole    Tablet 40milliGRAM(s) Oral before breakfast  ranolazine 500milliGRAM(s) Oral two times a day  nicotine -  14 mG/24Hr(s) Patch 1patch Transdermal daily  dextrose 10%. 1000milliLiter(s) IV Continuous <Continuous>  dextrose 5%. 1000milliLiter(s) IV Continuous <Continuous>  potassium acid phosphate/sodium acid phosphate tablet (K-PHOS No. 2) 1Tablet(s) Oral four times a day with meals    MEDICATIONS  (PRN):  dextrose Gel 1Dose(s) Oral once PRN Blood Glucose LESS THAN 70 milliGRAM(s)/deciLiter  glucagon  Injectable 1milliGRAM(s) IntraMuscular once PRN Glucose <70 milliGRAM(s)/deciLiter  ALBUTerol    90 MICROgram(s) HFA Inhaler 2Puff(s) Inhalation every 6 hours PRN Shortness of Breath and/or Wheezing      FAMILY HISTORY:  Family history of MI (myocardial infarction)      SOCIAL HISTORY: Denies IVDA, alcohol ingestion, or illicit substance use.       REVIEW OF SYSTEMS:  CONSTITUTIONAL: (+) Malaise. No fever, weight loss, or fatigue  EYES: No eye pain, visual disturbances, or discharge  ENT:  No difficulty hearing, tinnitus, vertigo; No sinus or throat pain  NECK: No pain or stiffness  RESPIRATORY: No cough, wheezing, chills or hemoptysis; No Shortness of Breath  CARDIOVASCULAR: No chest pain, palpitations, passing out, dizziness, or leg swelling  GASTROINTESTINAL: No abdominal or epigastric pain. No nausea, vomiting, or hematemesis; No diarrhea or constipation. No melena or hematochezia.  GENITOURINARY: No dysuria, frequency, hematuria, or incontinence  NEUROLOGICAL: (+) Improving tremors/chills. No headaches, memory loss, loss of strength, numbness  SKIN: No itching, burning, rashes, or lesions   LYMPH Nodes: No enlarged glands  ENDOCRINE: No heat or cold intolerance; No hair loss  MUSCULOSKELETAL: (+) Generalized weakness. No joint pain or swelling; No muscle, back, or extremity pain  PSYCHIATRIC: No depression, anxiety, mood swings, or difficulty sleeping  HEME/LYMPH: No easy bruising, or bleeding gums  ALLERGY AND IMMUNOLOGIC: No hives or eczema	        Vital Signs Last 24 Hrs  T(C): 36.1, Max: 36.9 (05-17 @ 20:25)  T(F): 96.9, Max: 98.4 ( @ 20:25)  HR: 63 (63 - 78)  BP: 126/83 (86/64 - 143/77)  BP(mean): 93 (70 - 93)  RR: 19 (18 - 22)  SpO2: 96% (89% - 99%)      Constitutional: WD, WN, NAD    HEENT: NC, AT    Neck:  No JVD, bruits or thyromegaly    Respiratory:  Clear without rales or rhonchi    Cardiovascular:  RR without murmur, rub or gallop.    Gastrointestinal: Obese, Soft without hepatosplenomegaly. Normoactive bowel sounds.      Extremities: without cyanosis, clubbing or edema.    Neurological:  Oriented   x   3   . No gross sensory or motor defects.        LABS:                        14.5   12.4  )-----------( 188      ( 18 May 2017 06:46 )             45.8     05-18    139  |  109<H>  |  7   ----------------------------<  150<H>  4.7   |  24  |  0.80    Ca    7.5<L>      18 May 2017 06:46  Phos  2.0     -  Mg     1.9         TPro  6.0  /  Alb  2.5<L>  /  TBili  0.2  /  DBili  x   /  AST  15  /  ALT  21  /  AlkPhos  86  -18          Urinalysis Basic - ( 17 May 2017 23:45 )    Color: Yellow / Appearance: Clear / S.020 / pH: x  Gluc: x / Ketone: Trace  / Bili: Negative / Urobili: Negative   Blood: x / Protein: 30 mg/dL / Nitrite: Negative   Leuk Esterase: Trace / RBC: 0-2 /HPF / WBC 0-2 /HPF   Sq Epi: x / Non Sq Epi: Occasional / Bacteria: Negative /HPF      CAPILLARY BLOOD GLUCOSE  111 (18 May 2017 07:00)  59 (18 May 2017 06:00)  60 (18 May 2017 05:00)  103 (18 May 2017 04:00)  77 (18 May 2017 03:00)  98 (18 May 2017 02:00)  161 (18 May 2017 01:43)  207 (18 May 2017 00:44)  76 (17 May 2017 23:15)  45 (17 May 2017 22:28)  99 (17 May 2017 20:42)  49 (17 May 2017 19:49)      RADIOLOGY & ADDITIONAL STUDIES:  EXAM:  CHEST-PORTABLE STAT  IMPRESSION:  Mild diffuse interstitial prominence.

## 2017-05-18 NOTE — BEHAVIORAL HEALTH ASSESSMENT NOTE - SUMMARY
56 yo male with PMH of  bipolar disorder, CAD, CHF, HTN, DM, JONAH, arthritis p/w hypoglycemia. unclear if pt misusing insulin. pt with prior admissions to the hospital due to hypoglycemia. pt denies current sxs and is a poor historian. denies prior psych h/o even though he was inpt psych late February/early march due to cortes. unable to reach wife to get collateral.   please contact wife and get collateral regarding list of medications, outpt treatment, psych treatment and recent mental status. pt denies SI/HI or any psych sxs.   please contact outpt psych to verify outpt psych meds that seem to be: trileptal 600 qd, prozac 40 mg qd and topamax 50 bid.   will follow

## 2017-05-18 NOTE — BEHAVIORAL HEALTH ASSESSMENT NOTE - NSBHCHARTREVIEWVS_PSY_A_CORE FT
Vital Signs Last 24 Hrs  T(C): 36.1, Max: 36.9 (05-17 @ 20:25)  T(F): 96.9, Max: 98.4 (05-17 @ 20:25)  HR: 61 (58 - 78)  BP: 101/44 (86/64 - 143/77)  BP(mean): 58 (58 - 93)  RR: 27 (18 - 28)  SpO2: 96% (89% - 99%)

## 2017-05-18 NOTE — CONSULT NOTE ADULT - PROBLEM SELECTOR RECOMMENDATION 9
- Afebrile, hemodynamically stable.  Improving blood glucose on D10 and D5.   - Taper off dextrose IVF as tolerated  - C/w holding diabetic medications  - Monitor for clinical signs and symptoms of hypoglycemia.   - Monitor Accu-Cheks closely  - Diabetes education  - Nutrition evaluation  - Lifestyle modifications - Afebrile, hemodynamically stable.  Improving blood glucose on D10 and D5.   - Obtain insulin and c-peptide levels.  - Taper off dextrose IVF as tolerated  - C/w holding diabetic medications  - Monitor for clinical signs and symptoms of hypoglycemia.   - Monitor Accu-Cheks closely  - Diabetes education  - Nutrition evaluation  - Regular diet  - Lifestyle modifications - Afebrile, hemodynamically stable.  Improving blood glucose on D10 and D5.   - Obtain insulin and c-peptide levels.  - Taper off dextrose IVF as tolerated  encourage po intake  obtain cortisol levels if persistant hypoglycemia as well as repeat c-peptide and insulin levels  - C/w holding diabetic medications  - Monitor for clinical signs and symptoms of hypoglycemia.   - Monitor Accu-Cheks closely  - Diabetes education  - Nutrition evaluation  - Regular diet  - Lifestyle modifications

## 2017-05-18 NOTE — BEHAVIORAL HEALTH ASSESSMENT NOTE - OTHER PAST PSYCHIATRIC HISTORY (INCLUDE DETAILS REGARDING ONSET, COURSE OF ILLNESS, INPATIENT/OUTPATIENT TREATMENT)
pt denies h/o inpt psych or SA. as per chart pt with inpt psych late February or early March due to cortes.   pt has outpt psych. says he sees her weekly and her name is Laura.  pt does not know his meds.

## 2017-05-19 LAB
ALBUMIN SERPL ELPH-MCNC: 2.5 G/DL — LOW (ref 3.5–5)
ALP SERPL-CCNC: 91 U/L — SIGNIFICANT CHANGE UP (ref 40–120)
ALT FLD-CCNC: 22 U/L DA — SIGNIFICANT CHANGE UP (ref 10–60)
ANION GAP SERPL CALC-SCNC: 8 MMOL/L — SIGNIFICANT CHANGE UP (ref 5–17)
AST SERPL-CCNC: 18 U/L — SIGNIFICANT CHANGE UP (ref 10–40)
BASOPHILS # BLD AUTO: 0.1 K/UL — SIGNIFICANT CHANGE UP (ref 0–0.2)
BASOPHILS NFR BLD AUTO: 0.5 % — SIGNIFICANT CHANGE UP (ref 0–2)
BILIRUB SERPL-MCNC: 0.4 MG/DL — SIGNIFICANT CHANGE UP (ref 0.2–1.2)
BUN SERPL-MCNC: 5 MG/DL — LOW (ref 7–18)
C PEPTIDE SERPL-MCNC: 0.2 NG/ML — LOW (ref 0.9–7.1)
C PEPTIDE SERPL-MCNC: 0.6 NG/ML — LOW (ref 0.9–7.1)
CALCIUM SERPL-MCNC: 7.8 MG/DL — LOW (ref 8.4–10.5)
CHLORIDE SERPL-SCNC: 107 MMOL/L — SIGNIFICANT CHANGE UP (ref 96–108)
CO2 SERPL-SCNC: 23 MMOL/L — SIGNIFICANT CHANGE UP (ref 22–31)
CORTIS AM PEAK SERPL-MCNC: 7.9 UG/DL — SIGNIFICANT CHANGE UP (ref 6–18.4)
CREAT SERPL-MCNC: 0.93 MG/DL — SIGNIFICANT CHANGE UP (ref 0.5–1.3)
EOSINOPHIL # BLD AUTO: 0.1 K/UL — SIGNIFICANT CHANGE UP (ref 0–0.5)
EOSINOPHIL NFR BLD AUTO: 1.5 % — SIGNIFICANT CHANGE UP (ref 0–6)
GLUCOSE SERPL-MCNC: 76 MG/DL — SIGNIFICANT CHANGE UP (ref 70–99)
HCT VFR BLD CALC: 49 % — SIGNIFICANT CHANGE UP (ref 39–50)
HGB BLD-MCNC: 15.3 G/DL — SIGNIFICANT CHANGE UP (ref 13–17)
INSULIN SERPL-MCNC: 32.4 UU/ML — HIGH (ref 3–17)
LYMPHOCYTES # BLD AUTO: 1.4 K/UL — SIGNIFICANT CHANGE UP (ref 1–3.3)
LYMPHOCYTES # BLD AUTO: 13.6 % — SIGNIFICANT CHANGE UP (ref 13–44)
MAGNESIUM SERPL-MCNC: 2.1 MG/DL — SIGNIFICANT CHANGE UP (ref 1.6–2.6)
MCHC RBC-ENTMCNC: 30.5 PG — SIGNIFICANT CHANGE UP (ref 27–34)
MCHC RBC-ENTMCNC: 31.2 GM/DL — LOW (ref 32–36)
MCV RBC AUTO: 97.8 FL — SIGNIFICANT CHANGE UP (ref 80–100)
MONOCYTES # BLD AUTO: 0.6 K/UL — SIGNIFICANT CHANGE UP (ref 0–0.9)
MONOCYTES NFR BLD AUTO: 5.8 % — SIGNIFICANT CHANGE UP (ref 2–14)
NEUTROPHILS # BLD AUTO: 7.8 K/UL — HIGH (ref 1.8–7.4)
NEUTROPHILS NFR BLD AUTO: 78.6 % — HIGH (ref 43–77)
PHOSPHATE SERPL-MCNC: 2.7 MG/DL — SIGNIFICANT CHANGE UP (ref 2.5–4.5)
PLATELET # BLD AUTO: 182 K/UL — SIGNIFICANT CHANGE UP (ref 150–400)
POTASSIUM SERPL-MCNC: 3.7 MMOL/L — SIGNIFICANT CHANGE UP (ref 3.5–5.3)
POTASSIUM SERPL-SCNC: 3.7 MMOL/L — SIGNIFICANT CHANGE UP (ref 3.5–5.3)
PROT SERPL-MCNC: 6.3 G/DL — SIGNIFICANT CHANGE UP (ref 6–8.3)
RBC # BLD: 5.01 M/UL — SIGNIFICANT CHANGE UP (ref 4.2–5.8)
RBC # FLD: 14.6 % — HIGH (ref 10.3–14.5)
SODIUM SERPL-SCNC: 138 MMOL/L — SIGNIFICANT CHANGE UP (ref 135–145)
WBC # BLD: 9.9 K/UL — SIGNIFICANT CHANGE UP (ref 3.8–10.5)
WBC # FLD AUTO: 9.9 K/UL — SIGNIFICANT CHANGE UP (ref 3.8–10.5)

## 2017-05-19 RX ORDER — DEXTROSE 50 % IN WATER 50 %
25 SYRINGE (ML) INTRAVENOUS ONCE
Qty: 0 | Refills: 0 | Status: COMPLETED | OUTPATIENT
Start: 2017-05-19 | End: 2017-05-19

## 2017-05-19 RX ORDER — SODIUM CHLORIDE 9 MG/ML
1000 INJECTION INTRAMUSCULAR; INTRAVENOUS; SUBCUTANEOUS ONCE
Qty: 0 | Refills: 0 | Status: DISCONTINUED | OUTPATIENT
Start: 2017-05-19 | End: 2017-05-19

## 2017-05-19 RX ORDER — DEXTROSE 10 % IN WATER 10 %
1000 INTRAVENOUS SOLUTION INTRAVENOUS
Qty: 0 | Refills: 0 | Status: DISCONTINUED | OUTPATIENT
Start: 2017-05-19 | End: 2017-05-19

## 2017-05-19 RX ORDER — FUROSEMIDE 40 MG
40 TABLET ORAL DAILY
Qty: 0 | Refills: 0 | Status: DISCONTINUED | OUTPATIENT
Start: 2017-05-19 | End: 2017-05-22

## 2017-05-19 RX ORDER — SODIUM CHLORIDE 9 MG/ML
1000 INJECTION, SOLUTION INTRAVENOUS
Qty: 0 | Refills: 0 | Status: DISCONTINUED | OUTPATIENT
Start: 2017-05-19 | End: 2017-05-19

## 2017-05-19 RX ADMIN — CLOPIDOGREL BISULFATE 75 MILLIGRAM(S): 75 TABLET, FILM COATED ORAL at 13:02

## 2017-05-19 RX ADMIN — Medication 25 MILLILITER(S): at 06:52

## 2017-05-19 RX ADMIN — RANOLAZINE 500 MILLIGRAM(S): 500 TABLET, FILM COATED, EXTENDED RELEASE ORAL at 06:18

## 2017-05-19 RX ADMIN — ATORVASTATIN CALCIUM 10 MILLIGRAM(S): 80 TABLET, FILM COATED ORAL at 21:35

## 2017-05-19 RX ADMIN — LOSARTAN POTASSIUM 25 MILLIGRAM(S): 100 TABLET, FILM COATED ORAL at 06:21

## 2017-05-19 RX ADMIN — Medication 25 MILLILITER(S): at 00:34

## 2017-05-19 RX ADMIN — SPIRONOLACTONE 25 MILLIGRAM(S): 25 TABLET, FILM COATED ORAL at 06:18

## 2017-05-19 RX ADMIN — Medication 50 MILLIGRAM(S): at 17:38

## 2017-05-19 RX ADMIN — OXCARBAZEPINE 600 MILLIGRAM(S): 300 TABLET, FILM COATED ORAL at 13:04

## 2017-05-19 RX ADMIN — ISOSORBIDE MONONITRATE 30 MILLIGRAM(S): 60 TABLET, EXTENDED RELEASE ORAL at 13:03

## 2017-05-19 RX ADMIN — Medication 40 MILLIGRAM(S): at 06:21

## 2017-05-19 RX ADMIN — PANTOPRAZOLE SODIUM 40 MILLIGRAM(S): 20 TABLET, DELAYED RELEASE ORAL at 06:21

## 2017-05-19 RX ADMIN — Medication 1 PATCH: at 13:03

## 2017-05-19 RX ADMIN — ENOXAPARIN SODIUM 40 MILLIGRAM(S): 100 INJECTION SUBCUTANEOUS at 13:02

## 2017-05-19 RX ADMIN — Medication 1 PATCH: at 13:04

## 2017-05-19 RX ADMIN — Medication 40 MILLIGRAM(S): at 13:03

## 2017-05-19 RX ADMIN — Medication 81 MILLIGRAM(S): at 13:02

## 2017-05-19 RX ADMIN — RANOLAZINE 500 MILLIGRAM(S): 500 TABLET, FILM COATED, EXTENDED RELEASE ORAL at 17:37

## 2017-05-19 RX ADMIN — Medication 50 MILLIGRAM(S): at 06:21

## 2017-05-19 NOTE — PROGRESS NOTE ADULT - ASSESSMENT
57 obese male from home PMH of CAD with stent x1( august 2016), CHF, arthritis, HTN, DM, JONAH (on BIPAP), bipolar disorder presented to the ED with complaint of weakness and hypoglycemia for last 2 days. Concern for excessive insulin administration vs insulin sensitivity with recurrent hypoglycemic episodes while on insulin. Admitted to ICU for close monitoring of accuchecks.    1) CNS- AAOX3, no sedatives. Ct head negative for acute pathology. Patient supposed to have psych issues in the past with inpatient admission in past. Will continue fluoxetine, topiramate and trileptal. will get psych consult- Dr. choi was consulted    2) Endocrine-  -Hypoglycemia.    Likely secondary to excessive insulin administration vs insulin sensitivity with recurrent hypoglycemic episodes while on insulin.  s/p 6 doses of D50 total.  Accuchecks- [id number]  Will continue D5 at 200cc/hr with D10 at 30cc/hr, since finger sticks are still on the lower side, will give D10% at 30 cc/hr in 2 IV lines on both limbs.  insulin and oral antihypoglycemic medications are held  will monitor blood glucose q 1 hr until blood glucose normalizes for >2 readings.  endocrinology, Dr. Aguiar recommended  to obtain cortisol levels if persistant hypoglycemia as well as repeat c-peptide and insulin levels also if patient is hypotensive.  continue regular diet.  -continue hypoglycemia protocol  -Ct head negative for acute pathology.  -Will f/u insulin level, c peptide, and A1c . insulin levels elevated to 77.   Patient was found to be Hypokalemia likely secondary to excess insulin intake on admission. K level 2.9 on admission. Patient received 40meq PO and 10meq x 3 IV-> 3.2, this AM, K levels are normalized    3)CVS- CAD s/p stent  Echo in march 2017 s/o EF of 55-60% with G 1 DD.  Will continue aspirin, Plavix, metoprolol, imdur, lipitor, ranexa, spironolactone.    4)Respiratory-  JONAH   Patient has JONAH, noncompliant with BIPAP.   Patient counseled on smoking cessation , started on nicotine patch.     5)ID- no antibiotics    6) Renal- No renal issues  7)Skin- no skin lesions.    8)Hem-onch: H/H stable.    9) GI: on protonix and DASH diet.    9) DVT prophylaxis.- on lovenox s/c 57 obese male from home PMH of CAD with stent x1( august 2016), CHF, arthritis, HTN, DM, JONAH (on BIPAP), bipolar disorder presented to the ED with complaint of weakness and hypoglycemia for last 2 days. Concern for excessive insulin administration vs insulin sensitivity with recurrent hypoglycemic episodes while on insulin. Admitted to ICU for close monitoring of accuchecks.    1) CNS- AAOX3, no sedatives. Ct head negative for acute pathology. Patient supposed to have psych issues in the past with inpatient admission in past. Will continue fluoxetine, topiramate and trileptal .psychiatrist- Dr. choi was consulted, who recommended to get more information from primary psychiatrist, will reach out today.    2) Endocrine-  -Hypoglycemia.    Likely secondary to excessive insulin administration vs insulin sensitivity with recurrent hypoglycemic episodes while on insulin.  s/p 9 doses of D50 total.  Accuchecks- [phone].  Will continue D5 at 75cc/hr with D10 at 30cc/hr  in 2 IV lines on both limbs., since finger sticks are still on the lower side,   insulin and oral antihypoglycemic medications are held  will monitor blood glucose q 1 hr until blood glucose normalizes for >2 readings.  endocrinology, Dr. Aguiar recommended  to obtain cortisol levels if persistant hypoglycemia as well as repeat c-peptide and insulin levels also if patient is hypotensive.  continue regular diet.  cortisol, insulin and c peptide levels are testing which were sent overnight. previous results s/o increased insulin to 77 and c peptide levels normal.  -continue hypoglycemia protocol  -Ct head negative for acute pathology.  -Hba1c- 8.9.  Patient was found to be Hypokalemia likely secondary to excess insulin intake on admission. K level 2.9 on admission. Patient received 40meq PO and 10meq x 3 IV-> 3.2, this AM, K levels are normalized    3)CVS- CAD s/p stent  Echo in march 2017 s/o EF of 55-60% with G 1 DD.  Will continue aspirin, Plavix, metoprolol, imdur, lipitor, ranexa, spironolactone.    4)Respiratory-  JONAH   Patient has JONAH, noncompliant with BIPAP.   Patient counseled on smoking cessation , started on nicotine patch.     5)ID- no antibiotics    6) Renal- No renal issues  7)Skin- no skin lesions.    8)Hem-onch: H/H stable.    9) GI: on protonix and DASH diet.    9) DVT prophylaxis.- on lovenox s/c 57 obese male from home PMH of CAD with stent x1( august 2016), CHF, arthritis, HTN, DM, JONAH (on BIPAP), bipolar disorder presented to the ED with complaint of weakness and hypoglycemia for last 2 days. Concern for excessive insulin administration vs insulin sensitivity with recurrent hypoglycemic episodes while on insulin. Admitted to ICU for close monitoring of accuchecks.    1) CNS- AAOX3, no sedatives. Ct head negative for acute pathology. Patient supposed to have psych issues in the past with inpatient admission in past. Will continue fluoxetine, topiramate and trileptal .psychiatrist- Dr. choi was consulted, who recommended to get more information from primary psychiatrist, will reach out today.    2) Endocrine-  -Hypoglycemia.    Likely secondary to excessive insulin administration vs insulin sensitivity with recurrent hypoglycemic episodes while on insulin.  s/p 9 doses of D50 total.  Accuchecks- [phone].  On D5 at 75cc/hr with D10 at 30cc/hr  in 2 IV lines on both limbs., since finger sticks are still on the lower side, will start D 10 3rd line, and d/c D5w.   insulin and oral antihypoglycemic medications are held  will monitor blood glucose q 1 hr until blood glucose normalizes for >2 readings.  endocrinology, Dr. Aguiar recommended  to obtain cortisol levels if persistant hypoglycemia as well as repeat c-peptide and insulin levels also if patient is hypotensive.  continue regular diet.  cortisol, insulin and c peptide levels are testing which were sent overnight. previous results s/o increased insulin to 77 and c peptide levels normal.  -continue hypoglycemia protocol  -Ct head negative for acute pathology.  -Hba1c- 8.9.  -Will get sulfonyl urea levels.  Patient was found to be Hypokalemia likely secondary to excess insulin intake on admission. K level 2.9 on admission. Patient received 40meq PO and 10meq x 3 IV-> 3.2, this AM, K levels are normalized    3)CVS- CAD s/p stent  Echo in march 2017 s/o EF of 55-60% with G 1 DD.  Will continue aspirin, Plavix, metoprolol, imdur, lipitor, ranexa, spironolactone.    4)Respiratory-  JONAH   Patient has JONAH, noncompliant with BIPAP.   Patient counseled on smoking cessation , started on nicotine patch.     5)ID- no antibiotics    6) Renal- No renal issues  7)Skin- no skin lesions.    8)Hem-onch: H/H stable.    9) GI: on protonix and DASH diet.    9) DVT prophylaxis.- on lovenox s/c

## 2017-05-19 NOTE — DIETITIAN INITIAL EVALUATION ADULT. - OTHER INFO
Pt wife c/o of meals served. Discussed with them, obtaining food choices. Tried to guide pt in regards to some better food choices and making steps toward a healthier lifestyle. Wife had declined diabetic ed, stating she knows what to do, but pt won't do it. Pt has some food from outside dietary dept.

## 2017-05-19 NOTE — PROGRESS NOTE ADULT - SUBJECTIVE AND OBJECTIVE BOX
INTERVAL HPI/OVERNIGHT EVENTS: ***  overnight patient F/s in 40s, D50 X2 given and this AM, one more dose of D50 given     PRESSORS: [ ] YES [ ] NO  WHICH:    ANTIBIOTICS:                  DATE STARTED:  ANTIBIOTICS:                  DATE STARTED:  ANTIBIOTICS:                  DATE STARTED:    Antimicrobial:    Cardiovascular:  spironolactone 25milliGRAM(s) Oral daily  losartan 25milliGRAM(s) Oral daily  isosorbide   mononitrate ER Tablet (IMDUR) 30milliGRAM(s) Oral daily  metoprolol succinate ER 25milliGRAM(s) Oral daily  furosemide    Tablet 40milliGRAM(s) Oral daily  ranolazine 500milliGRAM(s) Oral two times a day    Pulmonary:  ALBUTerol    90 MICROgram(s) HFA Inhaler 2Puff(s) Inhalation every 6 hours PRN    Hematalogic:  aspirin enteric coated 81milliGRAM(s) Oral daily  clopidogrel Tablet 75milliGRAM(s) Oral daily  enoxaparin Injectable 40milliGRAM(s) SubCutaneous daily    Other:  topiramate 50milliGRAM(s) Oral two times a day  OXcarbazepine 600milliGRAM(s) Oral daily  FLUoxetine 40milliGRAM(s) Oral daily  atorvastatin 10milliGRAM(s) Oral at bedtime  dextrose 5%. 1000milliLiter(s) IV Continuous <Continuous>  dextrose Gel 1Dose(s) Oral once PRN  dextrose 50% Injectable 12.5Gram(s) IV Push once  dextrose 50% Injectable 25Gram(s) IV Push once  dextrose 50% Injectable 25Gram(s) IV Push once  glucagon  Injectable 1milliGRAM(s) IntraMuscular once PRN  pantoprazole    Tablet 40milliGRAM(s) Oral before breakfast  nicotine -  14 mG/24Hr(s) Patch 1patch Transdermal daily  dextrose 10%. 1000milliLiter(s) IV Continuous <Continuous>  dextrose 10%. 1000milliLiter(s) IV Continuous <Continuous>  dextrose 5%. 1000milliLiter(s) IV Continuous <Continuous>    spironolactone 25milliGRAM(s) Oral daily  aspirin enteric coated 81milliGRAM(s) Oral daily  losartan 25milliGRAM(s) Oral daily  isosorbide   mononitrate ER Tablet (IMDUR) 30milliGRAM(s) Oral daily  topiramate 50milliGRAM(s) Oral two times a day  OXcarbazepine 600milliGRAM(s) Oral daily  FLUoxetine 40milliGRAM(s) Oral daily  atorvastatin 10milliGRAM(s) Oral at bedtime  dextrose 5%. 1000milliLiter(s) IV Continuous <Continuous>  dextrose Gel 1Dose(s) Oral once PRN  dextrose 50% Injectable 12.5Gram(s) IV Push once  dextrose 50% Injectable 25Gram(s) IV Push once  dextrose 50% Injectable 25Gram(s) IV Push once  glucagon  Injectable 1milliGRAM(s) IntraMuscular once PRN  clopidogrel Tablet 75milliGRAM(s) Oral daily  metoprolol succinate ER 25milliGRAM(s) Oral daily  ALBUTerol    90 MICROgram(s) HFA Inhaler 2Puff(s) Inhalation every 6 hours PRN  furosemide    Tablet 40milliGRAM(s) Oral daily  enoxaparin Injectable 40milliGRAM(s) SubCutaneous daily  pantoprazole    Tablet 40milliGRAM(s) Oral before breakfast  ranolazine 500milliGRAM(s) Oral two times a day  nicotine -  14 mG/24Hr(s) Patch 1patch Transdermal daily  dextrose 10%. 1000milliLiter(s) IV Continuous <Continuous>  dextrose 10%. 1000milliLiter(s) IV Continuous <Continuous>  dextrose 5%. 1000milliLiter(s) IV Continuous <Continuous>    Drug Dosing Weight  Height (cm): 175.3 (18 May 2017 01:43)  Weight (kg): 138.8 (18 May 2017 01:43)  BMI (kg/m2): 45.2 (18 May 2017 01:43)  BSA (m2): 2.48 (18 May 2017 01:43)    CENTRAL LINE: [ ] YES [ ] NO  LOCATION:   DATE INSERTED:  REMOVE: [ ] YES [ ] NO  EXPLAIN:    HANSON: [ ] YES [ ] NO    DATE INSERTED:  REMOVE:  [ ] YES [ ] NO  EXPLAIN:    A-LINE:  [ ] YES [ ] NO  LOCATION:   DATE INSERTED:  REMOVE:  [ ] YES [ ] NO  EXPLAIN:    PMH -reviewed admission note, no change since admission  Heart faliure: acute [ ] chronic [ ] acute or chronic [ ] diastolic [ ] systolic [ ] combied systolic and diastolic[ ]  VIOLA: ATN[ ] renal medullary necrosis [ ] CKD I [ ]CKDII [ ]CKD III [ ]CKD IV [ ]CKD V [ ]Other pathological lesions [ ]  Abdominal Nutrition Status: malnutrition [ ] cachexia [ ] morbid obesity/BMI=40 [ ] Supplement ordered [___________]     ICU Vital Signs Last 24 Hrs  T(C): 35.8, Max: 36.6 (-18 @ 10:00)  T(F): 96.4, Max: 97.9 (18 @ 20:27)  HR: 53 (53 - 70)  BP: 118/42 (97/47 - 126/83)  BP(mean): 62 (50 - 93)  ABP: --  ABP(mean): --  RR: 17 (17 - 31)  SpO2: 99% (95% - 99%)      ABG - ( 17 May 2017 21:59 )  pH: 7.42  /  pCO2: 42    /  pO2: 59    / HCO3: 26    / Base Excess: 2.3   /  SaO2: 91                          PHYSICAL EXAM:    GENERAL:   HEAD: atraumatic, normocephalic   EYES: PERRLA, white sclera.   ENMT: nasal mucosa- Oral cavity-   NECK: supple, JVD/ no JVD, thyroid-   LYMPH: no palpable lymph nodes     SKIN: warm, dry   CHEST/LUNG: ET tube: size        cm at lip. No Chest deformity , no chest tenderness. bilateral breath sounds,no  adventitious sounds  HEART: RRR, no m/r/g   ABDOMEN: soft, nontender, nondistended; bowel sounds.  :hanson catheter.  EXTREMITIES: +1 non-pitting edema, no cyanosis, no clubbing.  NEURO: AA0X , mood/ affect-, no focal neuro deficits        LABS:  CBC Full  -  ( 18 May 2017 06:46 )  WBC Count : 12.4 K/uL  Hemoglobin : 14.5 g/dL  Hematocrit : 45.8 %  Platelet Count - Automated : 188 K/uL  Mean Cell Volume : 97.9 fl  Mean Cell Hemoglobin : 31.0 pg  Mean Cell Hemoglobin Concentration : 31.6 gm/dL  Auto Neutrophil # : x  Auto Lymphocyte # : x  Auto Monocyte # : x  Auto Eosinophil # : x  Auto Basophil # : x  Auto Neutrophil % : x  Auto Lymphocyte % : x  Auto Monocyte % : x  Auto Eosinophil % : x  Auto Basophil % : x    05-18    139  |  109<H>  |  7   ----------------------------<  150<H>  4.7   |  24  |  0.80    Ca    7.5<L>      18 May 2017 06:46  Phos  2.0       Mg     1.9         TPro  6.0  /  Alb  2.5<L>  /  TBili  0.2  /  DBili  x   /  AST  15  /  ALT  21  /  AlkPhos  86        Urinalysis Basic - ( 17 May 2017 23:45 )    Color: Yellow / Appearance: Clear / S.020 / pH: x  Gluc: x / Ketone: Trace  / Bili: Negative / Urobili: Negative   Blood: x / Protein: 30 mg/dL / Nitrite: Negative   Leuk Esterase: Trace / RBC: 0-2 /HPF / WBC 0-2 /HPF   Sq Epi: x / Non Sq Epi: Occasional / Bacteria: Negative /HPF          RADIOLOGY & ADDITIONAL STUDIES REVIEWED:  ***    [ ]GOALS OF CARE DISCUSSION WITH PATIENT/FAMILY/PROXY:    CRITICAL CARE TIME SPENT: 35 minutes INTERVAL HPI/OVERNIGHT EVENTS: ***  overnight patient F/s in 40s, D50 X2 given and this AM, one more dose of D50 given     PRESSORS: [ ] YES [x ] NO  WHICH:    ANTIBIOTICS: none                DATE STARTED:  ANTIBIOTICS:                  DATE STARTED:  ANTIBIOTICS:                  DATE STARTED:    Antimicrobial:    Cardiovascular:  spironolactone 25milliGRAM(s) Oral daily  losartan 25milliGRAM(s) Oral daily  isosorbide   mononitrate ER Tablet (IMDUR) 30milliGRAM(s) Oral daily  metoprolol succinate ER 25milliGRAM(s) Oral daily  furosemide    Tablet 40milliGRAM(s) Oral daily  ranolazine 500milliGRAM(s) Oral two times a day    Pulmonary:  ALBUTerol    90 MICROgram(s) HFA Inhaler 2Puff(s) Inhalation every 6 hours PRN    Hematalogic:  aspirin enteric coated 81milliGRAM(s) Oral daily  clopidogrel Tablet 75milliGRAM(s) Oral daily  enoxaparin Injectable 40milliGRAM(s) SubCutaneous daily    Other:  topiramate 50milliGRAM(s) Oral two times a day  OXcarbazepine 600milliGRAM(s) Oral daily  FLUoxetine 40milliGRAM(s) Oral daily  atorvastatin 10milliGRAM(s) Oral at bedtime  dextrose 5%. 1000milliLiter(s) IV Continuous <Continuous>  dextrose Gel 1Dose(s) Oral once PRN  dextrose 50% Injectable 12.5Gram(s) IV Push once  dextrose 50% Injectable 25Gram(s) IV Push once  dextrose 50% Injectable 25Gram(s) IV Push once  glucagon  Injectable 1milliGRAM(s) IntraMuscular once PRN  pantoprazole    Tablet 40milliGRAM(s) Oral before breakfast  nicotine -  14 mG/24Hr(s) Patch 1patch Transdermal daily  dextrose 10%. 1000milliLiter(s) IV Continuous <Continuous>  dextrose 10%. 1000milliLiter(s) IV Continuous <Continuous>  dextrose 5%. 1000milliLiter(s) IV Continuous <Continuous>    spironolactone 25milliGRAM(s) Oral daily  aspirin enteric coated 81milliGRAM(s) Oral daily  losartan 25milliGRAM(s) Oral daily  isosorbide   mononitrate ER Tablet (IMDUR) 30milliGRAM(s) Oral daily  topiramate 50milliGRAM(s) Oral two times a day  OXcarbazepine 600milliGRAM(s) Oral daily  FLUoxetine 40milliGRAM(s) Oral daily  atorvastatin 10milliGRAM(s) Oral at bedtime  dextrose 5%. 1000milliLiter(s) IV Continuous <Continuous>  dextrose Gel 1Dose(s) Oral once PRN  dextrose 50% Injectable 12.5Gram(s) IV Push once  dextrose 50% Injectable 25Gram(s) IV Push once  dextrose 50% Injectable 25Gram(s) IV Push once  glucagon  Injectable 1milliGRAM(s) IntraMuscular once PRN  clopidogrel Tablet 75milliGRAM(s) Oral daily  metoprolol succinate ER 25milliGRAM(s) Oral daily  ALBUTerol    90 MICROgram(s) HFA Inhaler 2Puff(s) Inhalation every 6 hours PRN  furosemide    Tablet 40milliGRAM(s) Oral daily  enoxaparin Injectable 40milliGRAM(s) SubCutaneous daily  pantoprazole    Tablet 40milliGRAM(s) Oral before breakfast  ranolazine 500milliGRAM(s) Oral two times a day  nicotine -  14 mG/24Hr(s) Patch 1patch Transdermal daily  dextrose 10%. 1000milliLiter(s) IV Continuous <Continuous>  dextrose 10%. 1000milliLiter(s) IV Continuous <Continuous>  dextrose 5%. 1000milliLiter(s) IV Continuous <Continuous>    Drug Dosing Weight  Height (cm): 175.3 (18 May 2017 01:43)  Weight (kg): 138.8 (18 May 2017 01:43)  BMI (kg/m2): 45.2 (18 May 2017 01:43)  BSA (m2): 2.48 (18 May 2017 01:43)    CENTRAL LINE: [ ] YES [x ] NO  LOCATION:   DATE INSERTED:  REMOVE: [ ] YES [ ] NO  EXPLAIN:    HANSON: [ ] YES [x ] NO    DATE INSERTED:  REMOVE:  [ ] YES [ ] NO  EXPLAIN:    A-LINE:  [ ] YES [x ] NO  LOCATION:   DATE INSERTED:  REMOVE:  [ ] YES [ ] NO  EXPLAIN:    PMH -reviewed admission note, no change since admission  Heart faliure: acute [ ] chronic [ ] acute or chronic [ ] diastolic [ ] systolic [ ] combied systolic and diastolic[ ]  VIOLA: ATN[ ] renal medullary necrosis [ ] CKD I [ ]CKDII [ ]CKD III [ ]CKD IV [ ]CKD V [ ]Other pathological lesions [ ]  Abdominal Nutrition Status: malnutrition [ ] cachexia [ x] morbid obesity/BMI=40 [ ] Supplement ordered [___________]     ICU Vital Signs Last 24 Hrs  T(C): 35.8, Max: 36.6 (18 @ 10:00)  T(F): 96.4, Max: 97.9 ( @ 20:27)  HR: 53 (53 - 70)  BP: 118/42 (97/47 - 126/83)  BP(mean): 62 (50 - 93)  ABP: --  ABP(mean): --  RR: 17 (17 - 31)  SpO2: 99% (95% - 99%)      ABG - ( 17 May 2017 21:59 )  pH: 7.42  /  pCO2: 42    /  pO2: 59    / HCO3: 26    / Base Excess: 2.3   /  SaO2: 91                          PHYSICAL EXAM:    GENERAL:   HEAD: atraumatic, normocephalic   EYES: PERRLA, white sclera.   ENMT: nasal mucosa- Oral cavity-   NECK: supple, JVD/ no JVD, thyroid-   LYMPH: no palpable lymph nodes     SKIN: warm, dry   CHEST/LUNG:  No Chest deformity , no chest tenderness. bilateral breath sounds,no  adventitious sounds  HEART: RRR, no m/r/g   ABDOMEN: soft, nontender, nondistended; bowel sounds.  :hanson catheter.  EXTREMITIES: +1 non-pitting edema, no cyanosis, no clubbing.  NEURO: AA0X , mood/ affect-, no focal neuro deficits        LABS:  CBC Full  -  ( 18 May 2017 06:46 )  WBC Count : 12.4 K/uL  Hemoglobin : 14.5 g/dL  Hematocrit : 45.8 %  Platelet Count - Automated : 188 K/uL  Mean Cell Volume : 97.9 fl  Mean Cell Hemoglobin : 31.0 pg  Mean Cell Hemoglobin Concentration : 31.6 gm/dL  Auto Neutrophil # : x  Auto Lymphocyte # : x  Auto Monocyte # : x  Auto Eosinophil # : x  Auto Basophil # : x  Auto Neutrophil % : x  Auto Lymphocyte % : x  Auto Monocyte % : x  Auto Eosinophil % : x  Auto Basophil % : x    05-18    139  |  109<H>  |  7   ----------------------------<  150<H>  4.7   |  24  |  0.80    Ca    7.5<L>      18 May 2017 06:46  Phos  2.0       Mg     1.9         TPro  6.0  /  Alb  2.5<L>  /  TBili  0.2  /  DBili  x   /  AST  15  /  ALT  21  /  AlkPhos  86        Urinalysis Basic - ( 17 May 2017 23:45 )    Color: Yellow / Appearance: Clear / S.020 / pH: x  Gluc: x / Ketone: Trace  / Bili: Negative / Urobili: Negative   Blood: x / Protein: 30 mg/dL / Nitrite: Negative   Leuk Esterase: Trace / RBC: 0-2 /HPF / WBC 0-2 /HPF   Sq Epi: x / Non Sq Epi: Occasional / Bacteria: Negative /HPF          RADIOLOGY & ADDITIONAL STUDIES REVIEWED:  ***    [ ]GOALS OF CARE DISCUSSION WITH PATIENT/FAMILY/PROXY:    CRITICAL CARE TIME SPENT: 35 minutes INTERVAL HPI/OVERNIGHT EVENTS: ***  overnight patient F/s in 40s, D50 X2 given and this AM, one more dose of D50 given     PRESSORS: [ ] YES [x ] NO  WHICH:    ANTIBIOTICS: none                DATE STARTED:  ANTIBIOTICS:                  DATE STARTED:  ANTIBIOTICS:                  DATE STARTED:    Antimicrobial:    Cardiovascular:  spironolactone 25milliGRAM(s) Oral daily  losartan 25milliGRAM(s) Oral daily  isosorbide   mononitrate ER Tablet (IMDUR) 30milliGRAM(s) Oral daily  metoprolol succinate ER 25milliGRAM(s) Oral daily  furosemide    Tablet 40milliGRAM(s) Oral daily  ranolazine 500milliGRAM(s) Oral two times a day    Pulmonary:  ALBUTerol    90 MICROgram(s) HFA Inhaler 2Puff(s) Inhalation every 6 hours PRN    Hematalogic:  aspirin enteric coated 81milliGRAM(s) Oral daily  clopidogrel Tablet 75milliGRAM(s) Oral daily  enoxaparin Injectable 40milliGRAM(s) SubCutaneous daily    Other:  topiramate 50milliGRAM(s) Oral two times a day  OXcarbazepine 600milliGRAM(s) Oral daily  FLUoxetine 40milliGRAM(s) Oral daily  atorvastatin 10milliGRAM(s) Oral at bedtime  dextrose 5%. 1000milliLiter(s) IV Continuous <Continuous>  dextrose Gel 1Dose(s) Oral once PRN  dextrose 50% Injectable 12.5Gram(s) IV Push once  dextrose 50% Injectable 25Gram(s) IV Push once  dextrose 50% Injectable 25Gram(s) IV Push once  glucagon  Injectable 1milliGRAM(s) IntraMuscular once PRN  pantoprazole    Tablet 40milliGRAM(s) Oral before breakfast  nicotine -  14 mG/24Hr(s) Patch 1patch Transdermal daily  dextrose 10%. 1000milliLiter(s) IV Continuous <Continuous>  dextrose 10%. 1000milliLiter(s) IV Continuous <Continuous>  dextrose 5%. 1000milliLiter(s) IV Continuous <Continuous>    spironolactone 25milliGRAM(s) Oral daily  aspirin enteric coated 81milliGRAM(s) Oral daily  losartan 25milliGRAM(s) Oral daily  isosorbide   mononitrate ER Tablet (IMDUR) 30milliGRAM(s) Oral daily  topiramate 50milliGRAM(s) Oral two times a day  OXcarbazepine 600milliGRAM(s) Oral daily  FLUoxetine 40milliGRAM(s) Oral daily  atorvastatin 10milliGRAM(s) Oral at bedtime  dextrose 5%. 1000milliLiter(s) IV Continuous <Continuous>  dextrose Gel 1Dose(s) Oral once PRN  dextrose 50% Injectable 12.5Gram(s) IV Push once  dextrose 50% Injectable 25Gram(s) IV Push once  dextrose 50% Injectable 25Gram(s) IV Push once  glucagon  Injectable 1milliGRAM(s) IntraMuscular once PRN  clopidogrel Tablet 75milliGRAM(s) Oral daily  metoprolol succinate ER 25milliGRAM(s) Oral daily  ALBUTerol    90 MICROgram(s) HFA Inhaler 2Puff(s) Inhalation every 6 hours PRN  furosemide    Tablet 40milliGRAM(s) Oral daily  enoxaparin Injectable 40milliGRAM(s) SubCutaneous daily  pantoprazole    Tablet 40milliGRAM(s) Oral before breakfast  ranolazine 500milliGRAM(s) Oral two times a day  nicotine -  14 mG/24Hr(s) Patch 1patch Transdermal daily  dextrose 10%. 1000milliLiter(s) IV Continuous <Continuous>  dextrose 10%. 1000milliLiter(s) IV Continuous <Continuous>  dextrose 5%. 1000milliLiter(s) IV Continuous <Continuous>    Drug Dosing Weight  Height (cm): 175.3 (18 May 2017 01:43)  Weight (kg): 138.8 (18 May 2017 01:43)  BMI (kg/m2): 45.2 (18 May 2017 01:43)  BSA (m2): 2.48 (18 May 2017 01:43)    CENTRAL LINE: [ ] YES [x ] NO  LOCATION:   DATE INSERTED:  REMOVE: [ ] YES [ ] NO  EXPLAIN:    SEE: [ ] YES [x ] NO    DATE INSERTED:  REMOVE:  [ ] YES [ ] NO  EXPLAIN:    A-LINE:  [ ] YES [x ] NO  LOCATION:   DATE INSERTED:  REMOVE:  [ ] YES [ ] NO  EXPLAIN:    PMH -reviewed admission note, no change since admission  Heart failure acute [ ] chronic [ ] acute or chronic [ ] diastolic [ ] systolic [ ] combined systolic and diastolic[ ]  VIOLA: ATN[ ] renal medullary necrosis [ ] CKD I [ ]CKDII [ ]CKD III [ ]CKD IV [ ]CKD V [ ]Other pathological lesions [ ]  Abdominal Nutrition Status: malnutrition [ ] cachexia [ x] morbid obesity/BMI=40 [ ] Supplement ordered [___________]     ICU Vital Signs Last 24 Hrs  T(C): 35.8, Max: 36.6 ( @ 10:00)  T(F): 96.4, Max: 97.9 ( @ 20:27)  HR: 53 (53 - 70)  BP: 118/42 (97/47 - 126/83)  BP(mean): 62 (50 - 93)  ABP: --  ABP(mean): --  RR: 17 (17 - 31)  SpO2: 99% (95% - 99%)      ABG - ( 17 May 2017 21:59 )  pH: 7.42  /  pCO2: 42    /  pO2: 59    / HCO3: 26    / Base Excess: 2.3   /  SaO2: 91                          PHYSICAL EXAM:    GENERAL: Patient lying comfortably in bed  HEAD: atraumatic, normocephalic   EYES: PERRLA, white sclera.   ENMT: nasal mucosa and Oral cavity- normal  NECK: supple,  no JVD, thyroid- normal  LYMPH: no palpable lymph nodes     SKIN: tattoos noted on both upper and lower extremities  CHEST/LUNG:  No Chest deformity , no chest tenderness. bilateral breath sounds,no  adventitious sounds  HEART: RRR, no m/r/g   ABDOMEN: soft, nontender, nondistended; bowel sounds.  :none  EXTREMITIES: no pedal edema, no cyanosis, no clubbing.  NEURO: AA0X3 , mood/ affect-normal, no focal neuro deficits        LABS:  CBC Full  -  ( 18 May 2017 06:46 )  WBC Count : 12.4 K/uL  Hemoglobin : 14.5 g/dL  Hematocrit : 45.8 %  Platelet Count - Automated : 188 K/uL  Mean Cell Volume : 97.9 fl  Mean Cell Hemoglobin : 31.0 pg  Mean Cell Hemoglobin Concentration : 31.6 gm/dL  Auto Neutrophil # : x  Auto Lymphocyte # : x  Auto Monocyte # : x  Auto Eosinophil # : x  Auto Basophil # : x  Auto Neutrophil % : x  Auto Lymphocyte % : x  Auto Monocyte % : x  Auto Eosinophil % : x  Auto Basophil % : x    -    139  |  109<H>  |  7   ----------------------------<  150<H>  4.7   |  24  |  0.80    Ca    7.5<L>      18 May 2017 06:46  Phos  2.0       Mg     1.9         TPro  6.0  /  Alb  2.5<L>  /  TBili  0.2  /  DBili  x   /  AST  15  /  ALT  21  /  AlkPhos  86        Urinalysis Basic - ( 17 May 2017 23:45 )    Color: Yellow / Appearance: Clear / S.020 / pH: x  Gluc: x / Ketone: Trace  / Bili: Negative / Urobili: Negative   Blood: x / Protein: 30 mg/dL / Nitrite: Negative   Leuk Esterase: Trace / RBC: 0-2 /HPF / WBC 0-2 /HPF   Sq Epi: x / Non Sq Epi: Occasional / Bacteria: Negative /HPF          RADIOLOGY & ADDITIONAL STUDIES REVIEWED:  ***    [ x]GOALS OF CARE DISCUSSION WITH PATIENT/FAMILY/PROXY:    CRITICAL CARE TIME SPENT: 35 minutes

## 2017-05-19 NOTE — DIETITIAN INITIAL EVALUATION ADULT. - NS AS NUTRI INTERV ED CONTENT
Tried to encourage steps in the right direction/Nutrition relationship to health/disease/Other (specify)

## 2017-05-19 NOTE — CHART NOTE - NSCHARTNOTEFT_GEN_A_CORE
Upon Nutritional Assessment by the Registered Dietitian your patient was determined to meet criteria / has evidence of the following diagnosis/diagnoses:          [ ]  Mild Protein Calorie Malnutrition        [ ]  Moderate Protein Calorie Malnutrition        [ ] Severe Protein Calorie Malnutrition        [ ] Unspecified Protein Calorie Malnutrition        [ ] Underweight / BMI <19        x] Morbid Obesity / BMI > 40      Findings as based on:  •  Comprehensive nutrition assessment and consultation  •  Calorie counts (nutrient intake analysis)  •  Food acceptance and intake status from observations by staff  •  Follow up  •  Patient education  •  Intervention secondary to interdisciplinary rounds  •   concerns      Treatment:    The following diet has been recommended:  Tried to encourage pt to make changes (including diet and exercise) toward a healthier lifestyle  PROVIDER Section:     By signing this assessment you are acknowledging and agree with the diagnosis/diagnoses assigned by the Registered Dietitian    Comments:

## 2017-05-19 NOTE — PROGRESS NOTE ADULT - SUBJECTIVE AND OBJECTIVE BOX
Interval Events: Persistent hypoglycemia overnight on D10 IVF, s/p D50 25 cc X 2, and D5 IVF started. Intermittent bradycardia in 50's - morning metoprolol dosage not administered. Presently endorses generalized malaise, otherwise, denies additional acute events.  Tolerating diet.  Denies nausea or vomiting.       Allergies    No Known Allergies    Intolerances: Denies      Endocrine/Metabolic Medications:  atorvastatin 10milliGRAM(s) Oral at bedtime  dextrose Gel 1Dose(s) Oral once PRN  dextrose 50% Injectable 12.5Gram(s) IV Push once  dextrose 50% Injectable 25Gram(s) IV Push once  dextrose 50% Injectable 25Gram(s) IV Push once  glucagon  Injectable 1milliGRAM(s) IntraMuscular once PRN      Vital Signs Last 24 Hrs  T(C): 35.8, Max: 36.6 (05-18 @ 20:27)  T(F): 96.4, Max: 97.9 (05-18 @ 20:27)  HR: 64 (53 - 70)  BP: 113/66 (97/47 - 167/80)  BP(mean): 77 (50 - 102)  RR: 10 (10 - 31)  SpO2: 98% (95% - 99%)      PHYSICAL EXAM  All physical exam findings normal, except those marked:  General:	Alert, active, cooperative, NAD, well hydrated  .		[] Abnormal:  Neck		Normal: supple, no cervical adenopathy, no palpable thyroid  .		[] Abnormal:  Cardiovascular	Normal: regular rate, normal S1, S2, no murmurs  .		[] Abnormal:  Respiratory	Normal: no chest wall deformity, normal respiratory pattern, CTA B/L  .		[] Abnormal:  Abdominal	Normal: obese, soft, ND, NT, bowel sounds present, no masses, no organomegaly  .		[] Abnormal:  		Normal normal genitalia, testes descended, circumcised/uncircumcised  .		[] Abnormal:  Extremities	Normal: FROM x4  .		[] Abnormal:  Skin		Normal: intact and not indurated, no rash, no acanthosis nigricans  .		[] Abnormal:  Neurologic	Normal: grossly intact  .		[] Abnormal:    LABS                        15.3   9.9   )-----------( 182      ( 19 May 2017 06:55 )             49.0                               138    |  107    |  5                   Calcium: 7.8   / iCa: x      (05-19 @ 06:55)    ----------------------------<  76        Magnesium: 2.1                              3.7     |  23     |  0.93             Phosphorous: 2.7      TPro  6.3    /  Alb  2.5    /  TBili  0.4    /  DBili  x      /  AST  18     /  ALT  22     /  AlkPhos  91     19 May 2017 06:55    CAPILLARY BLOOD GLUCOSE  113 (19 May 2017 10:00)  126 (19 May 2017 08:00)  63 (19 May 2017 06:33)  88 (19 May 2017 04:00)  71 (19 May 2017 03:00)  57 (19 May 2017 02:00)  115 (19 May 2017 00:58)  47 (19 May 2017 00:25)  89 (18 May 2017 22:00)  120 (18 May 2017 20:00)  107 (18 May 2017 18:00)  107 (18 May 2017 17:00)  117 (18 May 2017 16:00)  113 (18 May 2017 15:35)  101 (18 May 2017 15:00)  97 (18 May 2017 14:00)  96 (18 May 2017 13:00)        ASSESSMENT/PLAN:     58 yo morbidly obese M from home, lives with wife, with PMHx Bipolar Disorder, CAD s/p stent x 1, CHF, arthritis, JONAH on BiPAP, HTN, DM II on Soliqua 30 U qd and Metformin 500 po bid, who p/w generalized weakness, chills, and malaise 2/2 hypoglycemia.      Hypoglycemia:   - Labs significant for HbA1C 8.9, C-Peptide 0.2, and Insulin level of 77.0 which is concerning for Exogenous Insulin toxicity  - Pt not taking sulfonylurea medications as outpatient, and previous sulfonylurea level in March 2017 was undetectable.   - Persistent hypoglycemia overnight on D10, s/p D50 25 cc X 2, and D5 IVF started.   - Increase continuous IV infusion of dextrose 10-20%  - Compliance to diet  - F/u cardio recs for additional beta blocker recs as it can inhibit rebound tachycardia from hypoglycemia  - Nutrition eval  - Lifestyle modifications  - DM education  - Closely monitor accu-cheks  - C/w care as per ICU team    - Medical plan discussed with patient and patients wife.  All questions and concerns appropriately addressed.

## 2017-05-19 NOTE — PROGRESS NOTE ADULT - ATTENDING COMMENTS
· Assessment	  57 obese male from home PMH of CAD with stent x1( august 2016), CHF, arthritis, HTN, DM, JONAH (on BIPAP), bipolar disorder presented to the ED with complaint of weakness and hypoglycemia for last 2 days. Concern for excessive insulin administration vs insulin sensitivity with recurrent hypoglycemic episodes while on insulin. Admitted to ICU for close monitoring of accuchecks.    1) CNS- AAOX3, no sedatives. Ct head negative for acute pathology. Patient supposed to have psych issues in the past with inpatient admission in past. Will continue fluoxetine, topiramate and trileptal.    2) Endocrine-  -Hypoglycemia.    Likely secondary to excessive insulin administration vs insulin sensitivity with recurrent hypoglycemic episodes while on insulin.  s/p 6 doses of D50 total.  Accuchecks- [phone]  Will continue D5 at 200cc/hr with D10 at 30cc/hr   insulin and oral antihypoglycemic medications are held  will monitor blood glucose q 1 hr until blood glucose normalizes for >2 readings.  endocrinology, Dr. Aguiar  consistent carbohydrate diet   -continue hypoglycemia protocol  -Ct head negative  Will f/u insulin level, c peptide, and A1c.   -will need psy evaluation
· Assessment	  57 obese male from home PMH of CAD with stent x1( august 2016), CHF, arthritis, HTN, DM, JONAH (on BIPAP), bipolar disorder presented to the ED with complaint of weakness and hypoglycemia for last 2 days. Concern for excessive insulin administration vs insulin sensitivity with recurrent hypoglycemic episodes while on insulin. Admitted to ICU for close monitoring of accuchecks.    1) CNS- AAOX3, no sedatives. Ct head negative for acute pathology. Patient supposed to have psych issues in the past with inpatient admission in past. Will continue fluoxetine, topiramate and trileptal .psychiatrist- Dr. choi was consulted, who recommended to get more information from primary psychiatrist, will reach out today.    2) Endocrine-  -Hypoglycemia.    Likely secondary to excessive insulin administration vs insulin sensitivity with recurrent hypoglycemic episodes while on insulin.  s/p 9 doses of D50 total.  Accuchecks- [phone].  On D5 at 75cc/hr with D10 at 30cc/hr  in 2 IV lines on both limbs., since finger sticks are still on the lower side, will start D 10 3rd line, and d/c D5w.   insulin and oral antihypoglycemic medications are held  will monitor blood glucose q 1 hr until blood glucose normalizes for >2 readings.  endocrinology, Dr. Aguiar recommended  to obtain cortisol levels if persistant hypoglycemia as well as repeat c-peptide and insulin levels also if patient is hypotensive.  continue regular diet.  cortisol, insulin and c peptide levels are testing which were sent overnight. previous results s/o increased insulin to 77 and c peptide levels normal.  -continue hypoglycemia protocol  -Ct head negative for acute pathology.  -Hba1c- 8.9.  -Will get sulfonyl urea levels.  Patient was found to be Hypokalemia likely secondary to excess insulin intake on admission. K level 2.9 on admission. Patient received 40meq PO and 10meq x 3 IV-> 3.2, this AM, K levels are normalized    3)CVS- CAD s/p stent  Echo in march 2017 s/o EF of 55-60% with G 1 DD.  Will continue aspirin, Plavix, metoprolol, imdur, lipitor, ranexa, spironolactone.    4)Respiratory-  JONAH   Patient has JONAH, noncompliant with BIPAP.   Patient counseled on smoking cessation , started on nicotine patch.     5)ID- no antibiotics    6) Renal- No renal issues  7)Skin- no skin lesions.    8)Hem-onch: H/H stable.    9) GI: on protonix and DASH diet.    9) DVT prophylaxis.- on lovenox s/c

## 2017-05-20 DIAGNOSIS — E65 LOCALIZED ADIPOSITY: ICD-10-CM

## 2017-05-20 DIAGNOSIS — I50.9 HEART FAILURE, UNSPECIFIED: ICD-10-CM

## 2017-05-20 DIAGNOSIS — E11.9 TYPE 2 DIABETES MELLITUS WITHOUT COMPLICATIONS: ICD-10-CM

## 2017-05-20 LAB
ALBUMIN SERPL ELPH-MCNC: 2.5 G/DL — LOW (ref 3.5–5)
ALP SERPL-CCNC: 98 U/L — SIGNIFICANT CHANGE UP (ref 40–120)
ALT FLD-CCNC: 22 U/L DA — SIGNIFICANT CHANGE UP (ref 10–60)
ANION GAP SERPL CALC-SCNC: 8 MMOL/L — SIGNIFICANT CHANGE UP (ref 5–17)
AST SERPL-CCNC: 29 U/L — SIGNIFICANT CHANGE UP (ref 10–40)
BASOPHILS # BLD AUTO: 0 K/UL — SIGNIFICANT CHANGE UP (ref 0–0.2)
BASOPHILS NFR BLD AUTO: 0.2 % — SIGNIFICANT CHANGE UP (ref 0–2)
BILIRUB SERPL-MCNC: 0.4 MG/DL — SIGNIFICANT CHANGE UP (ref 0.2–1.2)
BUN SERPL-MCNC: 12 MG/DL — SIGNIFICANT CHANGE UP (ref 7–18)
CALCIUM SERPL-MCNC: 8.4 MG/DL — SIGNIFICANT CHANGE UP (ref 8.4–10.5)
CHLORIDE SERPL-SCNC: 106 MMOL/L — SIGNIFICANT CHANGE UP (ref 96–108)
CO2 SERPL-SCNC: 25 MMOL/L — SIGNIFICANT CHANGE UP (ref 22–31)
CREAT SERPL-MCNC: 1.12 MG/DL — SIGNIFICANT CHANGE UP (ref 0.5–1.3)
EOSINOPHIL # BLD AUTO: 0.1 K/UL — SIGNIFICANT CHANGE UP (ref 0–0.5)
EOSINOPHIL NFR BLD AUTO: 1.3 % — SIGNIFICANT CHANGE UP (ref 0–6)
GLUCOSE SERPL-MCNC: 255 MG/DL — HIGH (ref 70–99)
HCT VFR BLD CALC: 44.4 % — SIGNIFICANT CHANGE UP (ref 39–50)
HGB BLD-MCNC: 15.1 G/DL — SIGNIFICANT CHANGE UP (ref 13–17)
LYMPHOCYTES # BLD AUTO: 1.1 K/UL — SIGNIFICANT CHANGE UP (ref 1–3.3)
LYMPHOCYTES # BLD AUTO: 14.4 % — SIGNIFICANT CHANGE UP (ref 13–44)
MAGNESIUM SERPL-MCNC: 2.3 MG/DL — SIGNIFICANT CHANGE UP (ref 1.6–2.6)
MCHC RBC-ENTMCNC: 32.3 PG — SIGNIFICANT CHANGE UP (ref 27–34)
MCHC RBC-ENTMCNC: 34 GM/DL — SIGNIFICANT CHANGE UP (ref 32–36)
MCV RBC AUTO: 95.3 FL — SIGNIFICANT CHANGE UP (ref 80–100)
MONOCYTES # BLD AUTO: 0.5 K/UL — SIGNIFICANT CHANGE UP (ref 0–0.9)
MONOCYTES NFR BLD AUTO: 5.8 % — SIGNIFICANT CHANGE UP (ref 2–14)
NEUTROPHILS # BLD AUTO: 6.3 K/UL — SIGNIFICANT CHANGE UP (ref 1.8–7.4)
NEUTROPHILS NFR BLD AUTO: 78.3 % — HIGH (ref 43–77)
PHOSPHATE SERPL-MCNC: 1.8 MG/DL — LOW (ref 2.5–4.5)
PLATELET # BLD AUTO: 179 K/UL — SIGNIFICANT CHANGE UP (ref 150–400)
POTASSIUM SERPL-MCNC: 4.5 MMOL/L — SIGNIFICANT CHANGE UP (ref 3.5–5.3)
POTASSIUM SERPL-SCNC: 4.5 MMOL/L — SIGNIFICANT CHANGE UP (ref 3.5–5.3)
PROT SERPL-MCNC: 6.5 G/DL — SIGNIFICANT CHANGE UP (ref 6–8.3)
RBC # BLD: 4.67 M/UL — SIGNIFICANT CHANGE UP (ref 4.2–5.8)
RBC # FLD: 15.6 % — HIGH (ref 10.3–14.5)
SODIUM SERPL-SCNC: 139 MMOL/L — SIGNIFICANT CHANGE UP (ref 135–145)
WBC # BLD: 8 K/UL — SIGNIFICANT CHANGE UP (ref 3.8–10.5)
WBC # FLD AUTO: 8 K/UL — SIGNIFICANT CHANGE UP (ref 3.8–10.5)

## 2017-05-20 RX ORDER — INSULIN LISPRO 100/ML
4 VIAL (ML) SUBCUTANEOUS ONCE
Qty: 0 | Refills: 0 | Status: COMPLETED | OUTPATIENT
Start: 2017-05-20 | End: 2017-05-20

## 2017-05-20 RX ORDER — SODIUM,POTASSIUM PHOSPHATES 278-250MG
1 POWDER IN PACKET (EA) ORAL
Qty: 0 | Refills: 0 | Status: DISCONTINUED | OUTPATIENT
Start: 2017-05-20 | End: 2017-05-22

## 2017-05-20 RX ORDER — INSULIN GLARGINE 100 [IU]/ML
10 INJECTION, SOLUTION SUBCUTANEOUS ONCE
Qty: 0 | Refills: 0 | Status: COMPLETED | OUTPATIENT
Start: 2017-05-20 | End: 2017-05-20

## 2017-05-20 RX ORDER — SODIUM CHLORIDE 0.65 %
1 AEROSOL, SPRAY (ML) NASAL EVERY 8 HOURS
Qty: 0 | Refills: 0 | Status: DISCONTINUED | OUTPATIENT
Start: 2017-05-20 | End: 2017-05-22

## 2017-05-20 RX ADMIN — Medication 50 MILLIGRAM(S): at 18:23

## 2017-05-20 RX ADMIN — Medication 1 APPLICATION(S): at 14:22

## 2017-05-20 RX ADMIN — Medication 40 MILLIGRAM(S): at 14:21

## 2017-05-20 RX ADMIN — INSULIN GLARGINE 10 UNIT(S): 100 INJECTION, SOLUTION SUBCUTANEOUS at 21:46

## 2017-05-20 RX ADMIN — Medication 1 PATCH: at 12:49

## 2017-05-20 RX ADMIN — RANOLAZINE 500 MILLIGRAM(S): 500 TABLET, FILM COATED, EXTENDED RELEASE ORAL at 18:23

## 2017-05-20 RX ADMIN — Medication 1 SPRAY(S): at 21:47

## 2017-05-20 RX ADMIN — ENOXAPARIN SODIUM 40 MILLIGRAM(S): 100 INJECTION SUBCUTANEOUS at 12:49

## 2017-05-20 RX ADMIN — Medication 81 MILLIGRAM(S): at 12:48

## 2017-05-20 RX ADMIN — Medication 50 MILLIGRAM(S): at 06:15

## 2017-05-20 RX ADMIN — OXCARBAZEPINE 600 MILLIGRAM(S): 300 TABLET, FILM COATED ORAL at 12:48

## 2017-05-20 RX ADMIN — ATORVASTATIN CALCIUM 10 MILLIGRAM(S): 80 TABLET, FILM COATED ORAL at 21:47

## 2017-05-20 RX ADMIN — Medication 1 PATCH: at 14:22

## 2017-05-20 RX ADMIN — CLOPIDOGREL BISULFATE 75 MILLIGRAM(S): 75 TABLET, FILM COATED ORAL at 12:48

## 2017-05-20 RX ADMIN — Medication 1 TABLET(S): at 21:47

## 2017-05-20 RX ADMIN — Medication 4 UNIT(S): at 21:47

## 2017-05-20 RX ADMIN — RANOLAZINE 500 MILLIGRAM(S): 500 TABLET, FILM COATED, EXTENDED RELEASE ORAL at 06:15

## 2017-05-20 RX ADMIN — PANTOPRAZOLE SODIUM 40 MILLIGRAM(S): 20 TABLET, DELAYED RELEASE ORAL at 06:15

## 2017-05-20 NOTE — CHART NOTE - NSCHARTNOTEFT_GEN_A_CORE
PGY1 on call  -FS noted to be persistently elevated  -will give lantus 10U x 1 dose tonight and humalog coverage  -changed diet to consistent carbs  -noticed low Phos on morning labs --> will give supplement w/ oral Kphos

## 2017-05-20 NOTE — PROGRESS NOTE ADULT - SUBJECTIVE AND OBJECTIVE BOX
57 obese male from home, lives with wife, with PMH of CAD with stent x1( august 2016), CHF, arthritis, HTN, DM, JONAH (on BIPAP), bipolar disorder presented to the ED with complaint of weakness and hypoglycemia. As per the patient he developed weakness 2 days ago. He checked his blood glucose and it was 77. He decided to hold his insulin dose due to the hypoglycemia. He again felt weak associated with mild nausea and noted hypoglycemia to 40's. On arrival to the ED, his BG was 49. He received D50 x 3 in ED with persistent hypoglycemia to 45 again, which later improved to 76. He was started on D10 in the ED with improvement of BG to 207. The patient was recently discharged on 3/13/17 after hospitalization for hypoglycemia. The patient has several admissions due to recurrent hypoglycemia. Insulin and cpeptide levels were both elevated on previous admission. A1C in 3/2017 was 7.7. Admitted to ICU for recurrent hypoglycemia- Concern for excessive insulin administration vs insulin sensitivity while on insulin.    pt seen in icu [  ], reg med floor [ x  ], bed [ x ], chair at bedside [   ], a+o x3 [x  ], lethargic [  ],  nad [x  ]    hanson [  ], ngt [  ], peg [  ], et tube [  ], cent line [  ], picc line [  ]        Allergies    No Known Allergies        Vitals    T(F): 97.8, Max: 99.3 (05-19 @ 19:47)  HR: 60 (60 - 78)  BP: 103/50 (10/61 - 120/58)  RR: 18 (17 - 25)  SpO2: 97% (93% - 99%)  Wt(kg): --  CAPILLARY BLOOD GLUCOSE  267 (20 May 2017 12:08)      Labs                          15.1   8.0   )-----------( 179      ( 20 May 2017 06:33 )             44.4       05-20    139  |  106  |  12  ----------------------------<  255<H>  4.5   |  25  |  1.12    Ca    8.4      20 May 2017 06:33  Phos  1.8     05-20  Mg     2.3     05-20    TPro  6.5  /  Alb  2.5<L>  /  TBili  0.4  /  DBili  x   /  AST  29  /  ALT  22  /  AlkPhos  98  05-20            .Urine Clean Catch (Midstream)  03-11 @ 00:09   No growth  --  --      .Blood Blood  03-10 @ 18:34   Growth in anaerobic bottle: Streptococcus agalactiae (Group B)  --  Streptococcus agalactiae (Group B)          Radiology Results      Meds    MEDICATIONS  (STANDING):  spironolactone 25milliGRAM(s) Oral daily  aspirin enteric coated 81milliGRAM(s) Oral daily  losartan 25milliGRAM(s) Oral daily  isosorbide   mononitrate ER Tablet (IMDUR) 30milliGRAM(s) Oral daily  topiramate 50milliGRAM(s) Oral two times a day  OXcarbazepine 600milliGRAM(s) Oral daily  FLUoxetine 40milliGRAM(s) Oral daily  atorvastatin 10milliGRAM(s) Oral at bedtime  dextrose 50% Injectable 12.5Gram(s) IV Push once  dextrose 50% Injectable 25Gram(s) IV Push once  dextrose 50% Injectable 25Gram(s) IV Push once  clopidogrel Tablet 75milliGRAM(s) Oral daily  metoprolol succinate ER 25milliGRAM(s) Oral daily  enoxaparin Injectable 40milliGRAM(s) SubCutaneous daily  pantoprazole    Tablet 40milliGRAM(s) Oral before breakfast  ranolazine 500milliGRAM(s) Oral two times a day  nicotine -  14 mG/24Hr(s) Patch 1patch Transdermal daily  silver sulfADIAZINE 1% Cream 1Application(s) Topical daily  furosemide    Tablet 40milliGRAM(s) Oral daily      MEDICATIONS  (PRN):  dextrose Gel 1Dose(s) Oral once PRN Blood Glucose LESS THAN 70 milliGRAM(s)/deciLiter  glucagon  Injectable 1milliGRAM(s) IntraMuscular once PRN Glucose <70 milliGRAM(s)/deciLiter  ALBUTerol    90 MICROgram(s) HFA Inhaler 2Puff(s) Inhalation every 6 hours PRN Shortness of Breath and/or Wheezing      Physical Exam    Neuro :  no focal deficits  Respiratory: CTA B/L  CV: RRR, S1S2, no murmurs,   Abdominal: Soft, NT, ND +BS,  Extremities: No edema, + peripheral pulses    ASSESSMENT    recurrent Hypoglycemia  Diabetes Mellitus Type 2 in Obese  Smoker  JONAH treated with BiPAP  Arthritis  CAD (coronary artery disease)  Congestive heart failure  Bipolar disorder  Abdominal Obesity  Umbilical Hernia without Obstruction or Gangrene      PLAN     Likely secondary to excessive insulin administration vs insulin sensitivity with recurrent hypoglycemic episodes while on insulin.  s/p multiple doses of D50 given.   Accuchecks today are- [phone], initially was On D5 at 75cc/hr with D10 at 30cc/hr  in 2 IV lines on both limbs., since finger started D 10 3rd line, and d/c D5w. later on the day, finger sticks improved to above 200.   insulin and oral antihypoglycemic medications are held  blood glucose levels above 200 >3 readings.  endocrinology cons noted recommended  to obtain cortisol levels if persistant hypoglycemia as well as repeat c-peptide and insulin levels also if patient is hypotensive, which were sent last night and on admission which s/o elevated insulin and low c peptide levels.  On regular diet, change to diabetic diet once hypoglycemia resolves.  Ct head was done on admission- negative for acute pathology.  f/u sulfonyl urea levels.  Echo -march 2017 s/o EF of 55-60% with G 1 DD.   pulm cons noted  patient has history of JONAH, noncompliant with BIPAP.  Not on BIPAP while in ICU.   psychiatrist- Dr. choi was consulted, who recommended to get more information from primary psychiatrist, tried to reach Dr. Burgess [phone].  Patient denied any psych hx to Dr. choi, tried to reach primary psychiatrist but no  answer.  cont current meds

## 2017-05-20 NOTE — CONSULT NOTE ADULT - SUBJECTIVE AND OBJECTIVE BOX
PULMONARY CONSULT NOTE      JOSE TURK  MRN-[id number]    Patient is a 57y old  Male who presents with a chief complaint of hypoglycemia (17 May 2017 23:55)  Patient has a hx of morbid obesity and JONAH.    HISTORY OF PRESENT ILLNESS: Awake, alert, OOB in NAD. Former smoker. Denies sob or cough at this time.    MEDICATIONS  (STANDING):  spironolactone 25milliGRAM(s) Oral daily  aspirin enteric coated 81milliGRAM(s) Oral daily  losartan 25milliGRAM(s) Oral daily  isosorbide   mononitrate ER Tablet (IMDUR) 30milliGRAM(s) Oral daily  topiramate 50milliGRAM(s) Oral two times a day  OXcarbazepine 600milliGRAM(s) Oral daily  FLUoxetine 40milliGRAM(s) Oral daily  atorvastatin 10milliGRAM(s) Oral at bedtime  dextrose 50% Injectable 12.5Gram(s) IV Push once  dextrose 50% Injectable 25Gram(s) IV Push once  dextrose 50% Injectable 25Gram(s) IV Push once  clopidogrel Tablet 75milliGRAM(s) Oral daily  metoprolol succinate ER 25milliGRAM(s) Oral daily  enoxaparin Injectable 40milliGRAM(s) SubCutaneous daily  pantoprazole    Tablet 40milliGRAM(s) Oral before breakfast  ranolazine 500milliGRAM(s) Oral two times a day  nicotine -  14 mG/24Hr(s) Patch 1patch Transdermal daily  silver sulfADIAZINE 1% Cream 1Application(s) Topical daily  furosemide    Tablet 40milliGRAM(s) Oral daily      MEDICATIONS  (PRN):  dextrose Gel 1Dose(s) Oral once PRN Blood Glucose LESS THAN 70 milliGRAM(s)/deciLiter  glucagon  Injectable 1milliGRAM(s) IntraMuscular once PRN Glucose <70 milliGRAM(s)/deciLiter  ALBUTerol    90 MICROgram(s) HFA Inhaler 2Puff(s) Inhalation every 6 hours PRN Shortness of Breath and/or Wheezing      Allergies    No Known Allergies    Intolerances        PAST MEDICAL & SURGICAL HISTORY:  Smoker  Arthritis  CAD (coronary artery disease)  JONAH treated with BiPAP  Congestive heart failure  Abdominal Obesity  Diabetes Mellitus Type 2 in Obese  Umbilical Hernia without Obstruction or Gangrene: s/p repair      FAMILY HISTORY:  Family history of MI (myocardial infarction)      SOCIAL HISTORY  Smoking History:     REVIEW OF SYSTEMS:    CONSTITUTIONAL:  No fevers, chills, sweats    HEENT:  Eyes:  No diplopia or blurred vision. ENT:  No earache, sore throat or runny nose.    CARDIOVASCULAR:  No pressure, squeezing, tightness, or heaviness about the chest; no palpitations.    RESPIRATORY:  Per HPI    GASTROINTESTINAL:  No abdominal pain, nausea, vomiting or diarrhea.    GENITOURINARY:  No dysuria, frequency or urgency.    NEUROLOGIC:  No paresthesias, fasciculations, seizures or weakness.    PSYCHIATRIC:  No disorder of thought or mood.    Vital Signs Last 24 Hrs  T(C): 36.6, Max: 37.4 (05-19 @ 19:47)  T(F): 97.8, Max: 99.3 (05-19 @ 19:47)  HR: 60 (60 - 78)  BP: 103/50 (10/61 - 120/58)  BP(mean): 61 (54 - 97)  RR: 18 (17 - 25)  SpO2: 97% (93% - 99%)  I&O's Detail    I & Os for current day (as of 20 May 2017 13:38)  =============================================  IN:    Oral Fluid: 1000 ml    dextrose 10%: 210 ml    dextrose 10%: 180 ml    dextrose 10%: 90 ml    Total IN: 1480 ml  ---------------------------------------------  OUT:    Voided: 2200 ml    Total OUT: 2200 ml  ---------------------------------------------  Total NET: -720 ml      PHYSICAL EXAMINATION:    GENERAL: The patient is a well-developed, well-nourished _____in no apparent distress.     HEENT: Head is normocephalic and atraumatic. Extraocular muscles are intact. Mucous membranes are moist.     NECK: Supple.     LUNGS: Clear to auscultation without wheezing, rales, or rhonchi. Respirations unlabored    HEART: Regular rate and rhythm without murmur.    ABDOMEN: Soft, nontender, and nondistended.  No hepatosplenomegaly is noted.    EXTREMITIES: Without any cyanosis, clubbing, rash, lesions or edema.    NEUROLOGIC: Grossly intact.      LABS:                        15.1   8.0   )-----------( 179      ( 20 May 2017 06:33 )             44.4     05-20    139  |  106  |  12  ----------------------------<  255<H>  4.5   |  25  |  1.12    Ca    8.4      20 May 2017 06:33  Phos  1.8     05-20  Mg     2.3     05-20    TPro  6.5  /  Alb  2.5<L>  /  TBili  0.4  /  DBili  x   /  AST  29  /  ALT  22  /  AlkPhos  98  05-20                        MICROBIOLOGY:    RADIOLOGY & ADDITIONAL STUDIES:    CXR:  IMPRESSION:  Mild diffuse interstitial prominence.    Ct scan chest:    ekg;    echo:

## 2017-05-21 RX ORDER — INSULIN LISPRO 100/ML
VIAL (ML) SUBCUTANEOUS
Qty: 0 | Refills: 0 | Status: DISCONTINUED | OUTPATIENT
Start: 2017-05-21 | End: 2017-05-21

## 2017-05-21 RX ORDER — INSULIN LISPRO 100/ML
VIAL (ML) SUBCUTANEOUS
Qty: 0 | Refills: 0 | Status: DISCONTINUED | OUTPATIENT
Start: 2017-05-21 | End: 2017-05-22

## 2017-05-21 RX ORDER — INSULIN LISPRO 100/ML
2 VIAL (ML) SUBCUTANEOUS ONCE
Qty: 0 | Refills: 0 | Status: COMPLETED | OUTPATIENT
Start: 2017-05-21 | End: 2017-05-21

## 2017-05-21 RX ORDER — SODIUM CHLORIDE 9 MG/ML
1000 INJECTION INTRAMUSCULAR; INTRAVENOUS; SUBCUTANEOUS ONCE
Qty: 0 | Refills: 0 | Status: COMPLETED | OUTPATIENT
Start: 2017-05-21 | End: 2017-05-21

## 2017-05-21 RX ORDER — SODIUM CHLORIDE 9 MG/ML
1000 INJECTION, SOLUTION INTRAVENOUS
Qty: 0 | Refills: 0 | Status: DISCONTINUED | OUTPATIENT
Start: 2017-05-21 | End: 2017-05-22

## 2017-05-21 RX ADMIN — ISOSORBIDE MONONITRATE 30 MILLIGRAM(S): 60 TABLET, EXTENDED RELEASE ORAL at 12:07

## 2017-05-21 RX ADMIN — Medication 1 TABLET(S): at 22:58

## 2017-05-21 RX ADMIN — Medication 1 APPLICATION(S): at 12:08

## 2017-05-21 RX ADMIN — RANOLAZINE 500 MILLIGRAM(S): 500 TABLET, FILM COATED, EXTENDED RELEASE ORAL at 17:32

## 2017-05-21 RX ADMIN — Medication 40 MILLIGRAM(S): at 12:05

## 2017-05-21 RX ADMIN — Medication 1 TABLET(S): at 08:26

## 2017-05-21 RX ADMIN — PANTOPRAZOLE SODIUM 40 MILLIGRAM(S): 20 TABLET, DELAYED RELEASE ORAL at 05:05

## 2017-05-21 RX ADMIN — LOSARTAN POTASSIUM 25 MILLIGRAM(S): 100 TABLET, FILM COATED ORAL at 05:06

## 2017-05-21 RX ADMIN — Medication 1 TABLET(S): at 12:07

## 2017-05-21 RX ADMIN — SPIRONOLACTONE 25 MILLIGRAM(S): 25 TABLET, FILM COATED ORAL at 05:05

## 2017-05-21 RX ADMIN — Medication 25 MILLIGRAM(S): at 05:06

## 2017-05-21 RX ADMIN — Medication 81 MILLIGRAM(S): at 12:06

## 2017-05-21 RX ADMIN — CLOPIDOGREL BISULFATE 75 MILLIGRAM(S): 75 TABLET, FILM COATED ORAL at 12:06

## 2017-05-21 RX ADMIN — Medication 4: at 17:31

## 2017-05-21 RX ADMIN — Medication 1 PATCH: at 13:21

## 2017-05-21 RX ADMIN — RANOLAZINE 500 MILLIGRAM(S): 500 TABLET, FILM COATED, EXTENDED RELEASE ORAL at 05:05

## 2017-05-21 RX ADMIN — Medication 40 MILLIGRAM(S): at 05:05

## 2017-05-21 RX ADMIN — ATORVASTATIN CALCIUM 10 MILLIGRAM(S): 80 TABLET, FILM COATED ORAL at 22:58

## 2017-05-21 RX ADMIN — ENOXAPARIN SODIUM 40 MILLIGRAM(S): 100 INJECTION SUBCUTANEOUS at 12:06

## 2017-05-21 RX ADMIN — SODIUM CHLORIDE 2000 MILLILITER(S): 9 INJECTION INTRAMUSCULAR; INTRAVENOUS; SUBCUTANEOUS at 20:28

## 2017-05-21 RX ADMIN — OXCARBAZEPINE 600 MILLIGRAM(S): 300 TABLET, FILM COATED ORAL at 12:07

## 2017-05-21 RX ADMIN — Medication 50 MILLIGRAM(S): at 05:05

## 2017-05-21 RX ADMIN — Medication 1 TABLET(S): at 17:32

## 2017-05-21 RX ADMIN — Medication 50 MILLIGRAM(S): at 17:32

## 2017-05-21 NOTE — PROGRESS NOTE ADULT - SUBJECTIVE AND OBJECTIVE BOX
Patient is a 57y old  Male who presents with a chief complaint of hypoglycemia (17 May 2017 23:55)  JONAH by hx on BIPAP. No cough, sob, wheezing.  Awake, alert, comfortable in bed in NAD.      INTERVAL HPI/OVERNIGHT EVENTS:      VITAL SIGNS:  T(F): 97.6  HR: 71  BP: 111/73  RR: 17  SpO2: 98%  Wt(kg): --  I&O's Detail          REVIEW OF SYSTEMS:    CONSTITUTIONAL:  No fevers, chills, sweats    HEENT:  Eyes:  No diplopia or blurred vision. ENT:  No earache, sore throat or runny nose.    CARDIOVASCULAR:  No pressure, squeezing, tightness, or heaviness about the chest; no palpitations.    RESPIRATORY:  Per HPI    GASTROINTESTINAL:  No abdominal pain, nausea, vomiting or diarrhea.    GENITOURINARY:  No dysuria, frequency or urgency.    NEUROLOGIC:  No paresthesias, fasciculations, seizures or weakness.    PSYCHIATRIC:  No disorder of thought or mood.      PHYSICAL EXAM:    Constitutional: Well developed and nourished  Eyes:Perrla  ENMT: normal  Neck:supple  Respiratory: good air entry  Cardiovascular: S1 S2 regular  Gastrointestinal: Soft, Non tender  Extremities: No edema  Vascular:normal  Neurological:Awake, alert,Ox3  Musculoskeletal:Normal      MEDICATIONS  (STANDING):  spironolactone 25milliGRAM(s) Oral daily  aspirin enteric coated 81milliGRAM(s) Oral daily  losartan 25milliGRAM(s) Oral daily  isosorbide   mononitrate ER Tablet (IMDUR) 30milliGRAM(s) Oral daily  topiramate 50milliGRAM(s) Oral two times a day  OXcarbazepine 600milliGRAM(s) Oral daily  FLUoxetine 40milliGRAM(s) Oral daily  atorvastatin 10milliGRAM(s) Oral at bedtime  dextrose 50% Injectable 12.5Gram(s) IV Push once  dextrose 50% Injectable 25Gram(s) IV Push once  dextrose 50% Injectable 25Gram(s) IV Push once  clopidogrel Tablet 75milliGRAM(s) Oral daily  metoprolol succinate ER 25milliGRAM(s) Oral daily  enoxaparin Injectable 40milliGRAM(s) SubCutaneous daily  pantoprazole    Tablet 40milliGRAM(s) Oral before breakfast  ranolazine 500milliGRAM(s) Oral two times a day  nicotine -  14 mG/24Hr(s) Patch 1patch Transdermal daily  silver sulfADIAZINE 1% Cream 1Application(s) Topical daily  furosemide    Tablet 40milliGRAM(s) Oral daily  potassium acid phosphate/sodium acid phosphate tablet (K-PHOS No. 2) 1Tablet(s) Oral four times a day with meals    MEDICATIONS  (PRN):  dextrose Gel 1Dose(s) Oral once PRN Blood Glucose LESS THAN 70 milliGRAM(s)/deciLiter  glucagon  Injectable 1milliGRAM(s) IntraMuscular once PRN Glucose <70 milliGRAM(s)/deciLiter  ALBUTerol    90 MICROgram(s) HFA Inhaler 2Puff(s) Inhalation every 6 hours PRN Shortness of Breath and/or Wheezing  sodium chloride 0.65% Nasal 1Spray(s) Both Nostrils every 8 hours PRN Nasal Congestion      Allergies    No Known Allergies    Intolerances        LABS:                        15.1   8.0   )-----------( 179      ( 20 May 2017 06:33 )             44.4     05-20    139  |  106  |  12  ----------------------------<  255<H>  4.5   |  25  |  1.12    Ca    8.4      20 May 2017 06:33  Phos  1.8     05-20  Mg     2.3     05-20    TPro  6.5  /  Alb  2.5<L>  /  TBili  0.4  /  DBili  x   /  AST  29  /  ALT  22  /  AlkPhos  98  05-20              CAPILLARY BLOOD GLUCOSE  262 (21 May 2017 11:39)  276 (21 May 2017 08:00)  293 (20 May 2017 20:20)  297 (20 May 2017 16:45)        RADIOLOGY & ADDITIONAL TESTS:    CXR:  FINDINGS:  There is mild diffuse interstitial prominence.  No pneumothorax or pleural effusion.   The cardiac silhouette is enlarged and magnified by technique.    IMPRESSION:  Mild diffuse interstitial prominence.    Ct scan chest:    ekg;    echo:

## 2017-05-21 NOTE — PROGRESS NOTE ADULT - SUBJECTIVE AND OBJECTIVE BOX
Patient is a 57y old  Male who presents with a chief complaint of hypoglycemia (17 May 2017 23:55)  Patient has a hx of morbid obesity and JONAH.  Awake, alert, comfortable in bed in NAD.     HISTORY OF PRESENT ILLNESS: Awake, alert, OOB in NAD. Former smoker. Denies sob or cough at this time.    MEDICATIONS  (STANDING):  spironolactone 25milliGRAM(s) Oral daily  aspirin enteric coated 81milliGRAM(s) Oral daily  losartan 25milliGRAM(s) Oral daily  isosorbide   mononitrate ER Tablet (IMDUR) 30milliGRAM(s) Oral daily  topiramate 50milliGRAM(s) Oral two times a day  OXcarbazepine 600milliGRAM(s) Oral daily  FLUoxetine 40milliGRAM(s) Oral daily  atorvastatin 10milliGRAM(s) Oral at bedtime  dextrose 50% Injectable 12.5Gram(s) IV Push once  dextrose 50% Injectable 25Gram(s) IV Push once  dextrose 50% Injectable 25Gram(s) IV Push once  clopidogrel Tablet 75milliGRAM(s) Oral daily  metoprolol succinate ER 25milliGRAM(s) Oral daily  enoxaparin Injectable 40milliGRAM(s) SubCutaneous daily  pantoprazole    Tablet 40milliGRAM(s) Oral before breakfast  ranolazine 500milliGRAM(s) Oral two times a day  nicotine -  14 mG/24Hr(s) Patch 1patch Transdermal daily  silver sulfADIAZINE 1% Cream 1Application(s) Topical daily  furosemide    Tablet 40milliGRAM(s) Oral daily      MEDICATIONS  (PRN):  dextrose Gel 1Dose(s) Oral once PRN Blood Glucose LESS THAN 70 milliGRAM(s)/deciLiter  glucagon  Injectable 1milliGRAM(s) IntraMuscular once PRN Glucose <70 milliGRAM(s)/deciLiter  ALBUTerol    90 MICROgram(s) HFA Inhaler 2Puff(s) Inhalation every 6 hours PRN Shortness of Breath and/or Wheezing      Allergies    No Known Allergies    Intolerances        PAST MEDICAL & SURGICAL HISTORY:  Smoker  Arthritis  CAD (coronary artery disease)  JONAH treated with BiPAP  Congestive heart failure  Abdominal Obesity  Diabetes Mellitus Type 2 in Obese  Umbilical Hernia without Obstruction or Gangrene: s/p repair      FAMILY HISTORY:  Family history of MI (myocardial infarction)      SOCIAL HISTORY  Smoking History:     REVIEW OF SYSTEMS:    CONSTITUTIONAL:  No fevers, chills, sweats    HEENT:  Eyes:  No diplopia or blurred vision. ENT:  No earache, sore throat or runny nose.    CARDIOVASCULAR:  No pressure, squeezing, tightness, or heaviness about the chest; no palpitations.    RESPIRATORY:  Per HPI    GASTROINTESTINAL:  No abdominal pain, nausea, vomiting or diarrhea.    GENITOURINARY:  No dysuria, frequency or urgency.    NEUROLOGIC:  No paresthesias, fasciculations, seizures or weakness.    PSYCHIATRIC:  No disorder of thought or mood.    Vital Signs Last 24 Hrs  T(C): 36.6, Max: 37.4 (05-19 @ 19:47)  T(F): 97.8, Max: 99.3 (05-19 @ 19:47)  HR: 60 (60 - 78)  BP: 103/50 (10/61 - 120/58)  BP(mean): 61 (54 - 97)  RR: 18 (17 - 25)  SpO2: 97% (93% - 99%)  I&O's Detail    I & Os for current day (as of 20 May 2017 13:38)  =============================================  IN:    Oral Fluid: 1000 ml    dextrose 10%: 210 ml    dextrose 10%: 180 ml    dextrose 10%: 90 ml    Total IN: 1480 ml  ---------------------------------------------  OUT:    Voided: 2200 ml    Total OUT: 2200 ml  ---------------------------------------------  Total NET: -720 ml      PHYSICAL EXAMINATION:    GENERAL: The patient is a well-developed, well-nourished _____in no apparent distress.     HEENT: Head is normocephalic and atraumatic. Extraocular muscles are intact. Mucous membranes are moist.     NECK: Supple.     LUNGS: Clear to auscultation without wheezing, rales, or rhonchi. Respirations unlabored    HEART: Regular rate and rhythm without murmur.    ABDOMEN: Soft, nontender, and nondistended.  No hepatosplenomegaly is noted.    EXTREMITIES: Without any cyanosis, clubbing, rash, lesions or edema.    NEUROLOGIC: Grossly intact.      LABS:                        15.1   8.0   )-----------( 179      ( 20 May 2017 06:33 )             44.4     05-20    139  |  106  |  12  ----------------------------<  255<H>  4.5   |  25  |  1.12    Ca    8.4      20 May 2017 06:33  Phos  1.8     05-20  Mg     2.3     05-20    TPro  6.5  /  Alb  2.5<L>  /  TBili  0.4  /  DBili  x   /  AST  29  /  ALT  22  /  AlkPhos  98  05-20                        MICROBIOLOGY:    RADIOLOGY & ADDITIONAL STUDIES:    CXR:  IMPRESSION:  Mild diffuse interstitial prominence.    Ct scan chest:    ekg;    echo:

## 2017-05-21 NOTE — PROGRESS NOTE ADULT - SUBJECTIVE AND OBJECTIVE BOX
Patient is a 57y old  Male who presents with a chief complaint of hypoglycemia (17 May 2017 23:55)    pt seen in icu [  ], reg med floor [ x  ], bed [ x ], chair at bedside [   ], a+o x3 [x  ], lethargic [  ],  nad [x  ]    Allergies    No Known Allergies        Vitals    T(F): 97.6, Max: 98.3 (05-20 @ 16:10)  HR: 71 (62 - 71)  BP: 111/73 (103/62 - 123/69)  RR: 17 (16 - 17)  SpO2: 98% (96% - 98%)  Wt(kg): --  CAPILLARY BLOOD GLUCOSE  276 (21 May 2017 08:00)      Labs                          15.1   8.0   )-----------( 179      ( 20 May 2017 06:33 )             44.4       05-20    139  |  106  |  12  ----------------------------<  255<H>  4.5   |  25  |  1.12    Ca    8.4      20 May 2017 06:33  Phos  1.8     05-20  Mg     2.3     05-20    TPro  6.5  /  Alb  2.5<L>  /  TBili  0.4  /  DBili  x   /  AST  29  /  ALT  22  /  AlkPhos  98  05-20            Radiology Results      Meds    MEDICATIONS  (STANDING):  spironolactone 25milliGRAM(s) Oral daily  aspirin enteric coated 81milliGRAM(s) Oral daily  losartan 25milliGRAM(s) Oral daily  isosorbide   mononitrate ER Tablet (IMDUR) 30milliGRAM(s) Oral daily  topiramate 50milliGRAM(s) Oral two times a day  OXcarbazepine 600milliGRAM(s) Oral daily  FLUoxetine 40milliGRAM(s) Oral daily  atorvastatin 10milliGRAM(s) Oral at bedtime  dextrose 50% Injectable 12.5Gram(s) IV Push once  dextrose 50% Injectable 25Gram(s) IV Push once  dextrose 50% Injectable 25Gram(s) IV Push once  clopidogrel Tablet 75milliGRAM(s) Oral daily  metoprolol succinate ER 25milliGRAM(s) Oral daily  enoxaparin Injectable 40milliGRAM(s) SubCutaneous daily  pantoprazole    Tablet 40milliGRAM(s) Oral before breakfast  ranolazine 500milliGRAM(s) Oral two times a day  nicotine -  14 mG/24Hr(s) Patch 1patch Transdermal daily  silver sulfADIAZINE 1% Cream 1Application(s) Topical daily  furosemide    Tablet 40milliGRAM(s) Oral daily  potassium acid phosphate/sodium acid phosphate tablet (K-PHOS No. 2) 1Tablet(s) Oral four times a day with meals      MEDICATIONS  (PRN):  dextrose Gel 1Dose(s) Oral once PRN Blood Glucose LESS THAN 70 milliGRAM(s)/deciLiter  glucagon  Injectable 1milliGRAM(s) IntraMuscular once PRN Glucose <70 milliGRAM(s)/deciLiter  ALBUTerol    90 MICROgram(s) HFA Inhaler 2Puff(s) Inhalation every 6 hours PRN Shortness of Breath and/or Wheezing  sodium chloride 0.65% Nasal 1Spray(s) Both Nostrils every 8 hours PRN Nasal Congestion      Physical Exam    Neuro :  no focal deficits  Respiratory: CTA B/L  CV: RRR, S1S2, no murmurs,   Abdominal: Soft, NT, ND +BS,  Extremities: No edema, + peripheral pulses    ASSESSMENT    recurrent Hypoglycemia  Diabetes Mellitus Type 2 in Obese  Smoker  JONAH treated with BiPAP  Arthritis  CAD (coronary artery disease)  Congestive heart failure  Bipolar disorder  Abdominal Obesity  Umbilical Hernia without Obstruction or Gangrene      PLAN     Likely secondary to excessive insulin administration vs insulin sensitivity with recurrent hypoglycemic episodes while on insulin.  s/p multiple doses of D50 given.   endocrinology f/u   insulin high and c peptide levels low.  diet changed to diabetic diet  f/u sulfonyl urea levels.  pulm cons f/u  psychiatrist f/u  obtain information from primary psychiatrist, Dr. Burgess [phone].  Patient denied any psych hx to Dr. choi,  cont current meds

## 2017-05-22 ENCOUNTER — TRANSCRIPTION ENCOUNTER (OUTPATIENT)
Age: 57
End: 2017-05-22

## 2017-05-22 VITALS — DIASTOLIC BLOOD PRESSURE: 48 MMHG | OXYGEN SATURATION: 94 % | SYSTOLIC BLOOD PRESSURE: 116 MMHG | HEART RATE: 61 BPM

## 2017-05-22 LAB
ANION GAP SERPL CALC-SCNC: 5 MMOL/L — SIGNIFICANT CHANGE UP (ref 5–17)
BASOPHILS # BLD AUTO: 0 K/UL — SIGNIFICANT CHANGE UP (ref 0–0.2)
BASOPHILS NFR BLD AUTO: 0.3 % — SIGNIFICANT CHANGE UP (ref 0–2)
BUN SERPL-MCNC: 11 MG/DL — SIGNIFICANT CHANGE UP (ref 7–18)
CALCIUM SERPL-MCNC: 8.5 MG/DL — SIGNIFICANT CHANGE UP (ref 8.4–10.5)
CHLORIDE SERPL-SCNC: 105 MMOL/L — SIGNIFICANT CHANGE UP (ref 96–108)
CO2 SERPL-SCNC: 26 MMOL/L — SIGNIFICANT CHANGE UP (ref 22–31)
CREAT SERPL-MCNC: 1.12 MG/DL — SIGNIFICANT CHANGE UP (ref 0.5–1.3)
EOSINOPHIL # BLD AUTO: 0.2 K/UL — SIGNIFICANT CHANGE UP (ref 0–0.5)
EOSINOPHIL NFR BLD AUTO: 1.8 % — SIGNIFICANT CHANGE UP (ref 0–6)
GLUCOSE SERPL-MCNC: 295 MG/DL — HIGH (ref 70–99)
HCT VFR BLD CALC: 42.5 % — SIGNIFICANT CHANGE UP (ref 39–50)
HGB BLD-MCNC: 14.8 G/DL — SIGNIFICANT CHANGE UP (ref 13–17)
LYMPHOCYTES # BLD AUTO: 1.4 K/UL — SIGNIFICANT CHANGE UP (ref 1–3.3)
LYMPHOCYTES # BLD AUTO: 16.2 % — SIGNIFICANT CHANGE UP (ref 13–44)
MAGNESIUM SERPL-MCNC: 2.2 MG/DL — SIGNIFICANT CHANGE UP (ref 1.6–2.6)
MCHC RBC-ENTMCNC: 32.9 PG — SIGNIFICANT CHANGE UP (ref 27–34)
MCHC RBC-ENTMCNC: 34.9 GM/DL — SIGNIFICANT CHANGE UP (ref 32–36)
MCV RBC AUTO: 94.2 FL — SIGNIFICANT CHANGE UP (ref 80–100)
MONOCYTES # BLD AUTO: 0.5 K/UL — SIGNIFICANT CHANGE UP (ref 0–0.9)
MONOCYTES NFR BLD AUTO: 6 % — SIGNIFICANT CHANGE UP (ref 2–14)
NEUTROPHILS # BLD AUTO: 6.7 K/UL — SIGNIFICANT CHANGE UP (ref 1.8–7.4)
NEUTROPHILS NFR BLD AUTO: 75.7 % — SIGNIFICANT CHANGE UP (ref 43–77)
PHOSPHATE SERPL-MCNC: 2.5 MG/DL — SIGNIFICANT CHANGE UP (ref 2.5–4.5)
PLATELET # BLD AUTO: 166 K/UL — SIGNIFICANT CHANGE UP (ref 150–400)
POTASSIUM SERPL-MCNC: 4.7 MMOL/L — SIGNIFICANT CHANGE UP (ref 3.5–5.3)
POTASSIUM SERPL-SCNC: 4.7 MMOL/L — SIGNIFICANT CHANGE UP (ref 3.5–5.3)
RBC # BLD: 4.52 M/UL — SIGNIFICANT CHANGE UP (ref 4.2–5.8)
RBC # FLD: 15 % — HIGH (ref 10.3–14.5)
SODIUM SERPL-SCNC: 136 MMOL/L — SIGNIFICANT CHANGE UP (ref 135–145)
WBC # BLD: 8.8 K/UL — SIGNIFICANT CHANGE UP (ref 3.8–10.5)
WBC # FLD AUTO: 8.8 K/UL — SIGNIFICANT CHANGE UP (ref 3.8–10.5)

## 2017-05-22 PROCEDURE — 82803 BLOOD GASES ANY COMBINATION: CPT

## 2017-05-22 PROCEDURE — 97162 PT EVAL MOD COMPLEX 30 MIN: CPT

## 2017-05-22 PROCEDURE — 93005 ELECTROCARDIOGRAM TRACING: CPT

## 2017-05-22 PROCEDURE — 84681 ASSAY OF C-PEPTIDE: CPT

## 2017-05-22 PROCEDURE — 83525 ASSAY OF INSULIN: CPT

## 2017-05-22 PROCEDURE — 97116 GAIT TRAINING THERAPY: CPT

## 2017-05-22 PROCEDURE — 80048 BASIC METABOLIC PNL TOTAL CA: CPT

## 2017-05-22 PROCEDURE — 70450 CT HEAD/BRAIN W/O DYE: CPT

## 2017-05-22 PROCEDURE — 71045 X-RAY EXAM CHEST 1 VIEW: CPT

## 2017-05-22 PROCEDURE — 83036 HEMOGLOBIN GLYCOSYLATED A1C: CPT

## 2017-05-22 PROCEDURE — 97110 THERAPEUTIC EXERCISES: CPT

## 2017-05-22 PROCEDURE — 84100 ASSAY OF PHOSPHORUS: CPT

## 2017-05-22 PROCEDURE — 81001 URINALYSIS AUTO W/SCOPE: CPT

## 2017-05-22 PROCEDURE — 83735 ASSAY OF MAGNESIUM: CPT

## 2017-05-22 PROCEDURE — 97530 THERAPEUTIC ACTIVITIES: CPT

## 2017-05-22 PROCEDURE — 82533 TOTAL CORTISOL: CPT

## 2017-05-22 PROCEDURE — 80053 COMPREHEN METABOLIC PANEL: CPT

## 2017-05-22 PROCEDURE — 85027 COMPLETE CBC AUTOMATED: CPT

## 2017-05-22 PROCEDURE — 99285 EMERGENCY DEPT VISIT HI MDM: CPT | Mod: 25

## 2017-05-22 RX ORDER — FLUOXETINE HCL 10 MG
1 CAPSULE ORAL
Qty: 0 | Refills: 0 | COMMUNITY
Start: 2017-05-22

## 2017-05-22 RX ORDER — OXCARBAZEPINE 300 MG/1
1 TABLET, FILM COATED ORAL
Qty: 0 | Refills: 0 | COMMUNITY
Start: 2017-05-22

## 2017-05-22 RX ORDER — TOPIRAMATE 25 MG
1 TABLET ORAL
Qty: 0 | Refills: 0 | COMMUNITY

## 2017-05-22 RX ORDER — LINAGLIPTIN 5 MG/1
1 TABLET, FILM COATED ORAL
Qty: 30 | Refills: 0 | OUTPATIENT
Start: 2017-05-22 | End: 2017-06-21

## 2017-05-22 RX ORDER — FLUOXETINE HCL 10 MG
1 CAPSULE ORAL
Qty: 0 | Refills: 0 | COMMUNITY

## 2017-05-22 RX ORDER — METOPROLOL TARTRATE 50 MG
25 TABLET ORAL
Qty: 0 | Refills: 0 | COMMUNITY

## 2017-05-22 RX ORDER — LOSARTAN POTASSIUM 100 MG/1
1 TABLET, FILM COATED ORAL
Qty: 0 | Refills: 0 | COMMUNITY
Start: 2017-05-22

## 2017-05-22 RX ORDER — OXCARBAZEPINE 300 MG/1
1 TABLET, FILM COATED ORAL
Qty: 0 | Refills: 0 | COMMUNITY

## 2017-05-22 RX ORDER — ATORVASTATIN CALCIUM 80 MG/1
1 TABLET, FILM COATED ORAL
Qty: 0 | Refills: 0 | COMMUNITY

## 2017-05-22 RX ORDER — FUROSEMIDE 40 MG
1 TABLET ORAL
Qty: 0 | Refills: 0 | COMMUNITY
Start: 2017-05-22

## 2017-05-22 RX ORDER — METFORMIN HYDROCHLORIDE 850 MG/1
1 TABLET ORAL
Qty: 60 | Refills: 0 | OUTPATIENT
Start: 2017-05-22 | End: 2017-06-21

## 2017-05-22 RX ORDER — TOPIRAMATE 25 MG
1 TABLET ORAL
Qty: 0 | Refills: 0 | COMMUNITY
Start: 2017-05-22

## 2017-05-22 RX ORDER — RANOLAZINE 500 MG/1
1 TABLET, FILM COATED, EXTENDED RELEASE ORAL
Qty: 0 | Refills: 0 | COMMUNITY
Start: 2017-05-22

## 2017-05-22 RX ORDER — ATORVASTATIN CALCIUM 80 MG/1
1 TABLET, FILM COATED ORAL
Qty: 0 | Refills: 0 | COMMUNITY
Start: 2017-05-22

## 2017-05-22 RX ORDER — SPIRONOLACTONE 25 MG/1
1 TABLET, FILM COATED ORAL
Qty: 0 | Refills: 0 | COMMUNITY
Start: 2017-05-22

## 2017-05-22 RX ORDER — METOPROLOL TARTRATE 50 MG
1 TABLET ORAL
Qty: 0 | Refills: 0 | COMMUNITY
Start: 2017-05-22

## 2017-05-22 RX ORDER — ASPIRIN/CALCIUM CARB/MAGNESIUM 324 MG
1 TABLET ORAL
Qty: 0 | Refills: 0 | COMMUNITY
Start: 2017-05-22

## 2017-05-22 RX ORDER — NICOTINE POLACRILEX 2 MG
1 GUM BUCCAL
Qty: 30 | Refills: 0 | OUTPATIENT
Start: 2017-05-22 | End: 2017-06-21

## 2017-05-22 RX ORDER — RANOLAZINE 500 MG/1
1 TABLET, FILM COATED, EXTENDED RELEASE ORAL
Qty: 0 | Refills: 0 | COMMUNITY

## 2017-05-22 RX ORDER — CLOPIDOGREL BISULFATE 75 MG/1
1 TABLET, FILM COATED ORAL
Qty: 0 | Refills: 0 | COMMUNITY
Start: 2017-05-22

## 2017-05-22 RX ORDER — LOSARTAN POTASSIUM 100 MG/1
1 TABLET, FILM COATED ORAL
Qty: 0 | Refills: 0 | COMMUNITY

## 2017-05-22 RX ORDER — ISOSORBIDE MONONITRATE 60 MG/1
1 TABLET, EXTENDED RELEASE ORAL
Qty: 0 | Refills: 0 | COMMUNITY
Start: 2017-05-22

## 2017-05-22 RX ORDER — CANAGLIFLOZIN 100 MG/1
1 TABLET, FILM COATED ORAL
Qty: 30 | Refills: 0 | OUTPATIENT
Start: 2017-05-22 | End: 2017-06-21

## 2017-05-22 RX ORDER — ALBUTEROL 90 UG/1
2 AEROSOL, METERED ORAL
Qty: 0 | Refills: 0 | COMMUNITY
Start: 2017-05-22

## 2017-05-22 RX ADMIN — Medication 40 MILLIGRAM(S): at 05:43

## 2017-05-22 RX ADMIN — LOSARTAN POTASSIUM 25 MILLIGRAM(S): 100 TABLET, FILM COATED ORAL at 05:43

## 2017-05-22 RX ADMIN — PANTOPRAZOLE SODIUM 40 MILLIGRAM(S): 20 TABLET, DELAYED RELEASE ORAL at 07:55

## 2017-05-22 RX ADMIN — Medication 2 UNIT(S): at 00:24

## 2017-05-22 RX ADMIN — RANOLAZINE 500 MILLIGRAM(S): 500 TABLET, FILM COATED, EXTENDED RELEASE ORAL at 06:51

## 2017-05-22 RX ADMIN — Medication 25 MILLIGRAM(S): at 05:43

## 2017-05-22 RX ADMIN — Medication 3: at 07:55

## 2017-05-22 RX ADMIN — Medication 50 MILLIGRAM(S): at 06:51

## 2017-05-22 RX ADMIN — Medication 4: at 12:08

## 2017-05-22 RX ADMIN — Medication 1 TABLET(S): at 08:48

## 2017-05-22 RX ADMIN — SPIRONOLACTONE 25 MILLIGRAM(S): 25 TABLET, FILM COATED ORAL at 06:51

## 2017-05-22 RX ADMIN — Medication 1 APPLICATION(S): at 12:09

## 2017-05-22 NOTE — PROGRESS NOTE ADULT - PROBLEM SELECTOR PROBLEM 4
Diabetes Mellitus Type 2 in Obese

## 2017-05-22 NOTE — DISCHARGE NOTE ADULT - MEDICATION SUMMARY - MEDICATIONS TO STOP TAKING
I will STOP taking the medications listed below when I get home from the hospital:    Cozaar 25 mg oral tablet  -- 1 tab(s) by mouth once a day    insulin  -- 30 unit(s)

## 2017-05-22 NOTE — DISCHARGE NOTE ADULT - SECONDARY DIAGNOSIS.
JONAH treated with BiPAP Chronic diastolic congestive heart failure Coronary artery disease without angina pectoris, unspecified vessel or lesion type, unspecified whether native or transplanted heart Bipolar affective disorder, remission status unspecified

## 2017-05-22 NOTE — PROGRESS NOTE ADULT - SUBJECTIVE AND OBJECTIVE BOX
Patient is a 57y old  Male who presents with a chief complaint of hypoglycemia (17 May 2017 23:55)      INTERVAL HPI/OVERNIGHT EVENTS:      VITAL SIGNS:  T(F): 97.5  HR: 61  BP: 145/80  RR: 16  SpO2: 97%  Wt(kg): --  I&O's Detail          REVIEW OF SYSTEMS:    CONSTITUTIONAL:  No fevers, chills, sweats    HEENT:  Eyes:  No diplopia or blurred vision. ENT:  No earache, sore throat or runny nose.    CARDIOVASCULAR:  No pressure, squeezing, tightness, or heaviness about the chest; no palpitations.    RESPIRATORY:  Per HPI    GASTROINTESTINAL:  No abdominal pain, nausea, vomiting or diarrhea.    GENITOURINARY:  No dysuria, frequency or urgency.    NEUROLOGIC:  No paresthesias, fasciculations, seizures or weakness.    PSYCHIATRIC:  No disorder of thought or mood.      PHYSICAL EXAM:    Constitutional: Well developed and nourished  Eyes:Perrla  ENMT: normal  Neck:supple  Respiratory: good air entry  Cardiovascular: S1 S2 regular  Gastrointestinal: Soft, Non tender  Extremities: No edema  Vascular:normal  Neurological:Awake, alert,Ox3  Musculoskeletal:Normal      MEDICATIONS  (STANDING):  spironolactone 25milliGRAM(s) Oral daily  aspirin enteric coated 81milliGRAM(s) Oral daily  losartan 25milliGRAM(s) Oral daily  isosorbide   mononitrate ER Tablet (IMDUR) 30milliGRAM(s) Oral daily  topiramate 50milliGRAM(s) Oral two times a day  OXcarbazepine 600milliGRAM(s) Oral daily  FLUoxetine 40milliGRAM(s) Oral daily  atorvastatin 10milliGRAM(s) Oral at bedtime  dextrose 50% Injectable 12.5Gram(s) IV Push once  dextrose 50% Injectable 25Gram(s) IV Push once  dextrose 50% Injectable 25Gram(s) IV Push once  clopidogrel Tablet 75milliGRAM(s) Oral daily  metoprolol succinate ER 25milliGRAM(s) Oral daily  enoxaparin Injectable 40milliGRAM(s) SubCutaneous daily  pantoprazole    Tablet 40milliGRAM(s) Oral before breakfast  ranolazine 500milliGRAM(s) Oral two times a day  nicotine -  14 mG/24Hr(s) Patch 1patch Transdermal daily  silver sulfADIAZINE 1% Cream 1Application(s) Topical daily  furosemide    Tablet 40milliGRAM(s) Oral daily  potassium acid phosphate/sodium acid phosphate tablet (K-PHOS No. 2) 1Tablet(s) Oral four times a day with meals  dextrose 5%. 1000milliLiter(s) IV Continuous <Continuous>  insulin lispro (HumaLOG) corrective regimen sliding scale  SubCutaneous Before meals and at bedtime    MEDICATIONS  (PRN):  dextrose Gel 1Dose(s) Oral once PRN Blood Glucose LESS THAN 70 milliGRAM(s)/deciLiter  glucagon  Injectable 1milliGRAM(s) IntraMuscular once PRN Glucose <70 milliGRAM(s)/deciLiter  ALBUTerol    90 MICROgram(s) HFA Inhaler 2Puff(s) Inhalation every 6 hours PRN Shortness of Breath and/or Wheezing  sodium chloride 0.65% Nasal 1Spray(s) Both Nostrils every 8 hours PRN Nasal Congestion      Allergies    No Known Allergies    Intolerances        LABS:                        14.8   8.8   )-----------( 166      ( 22 May 2017 07:16 )             42.5     05-22    136  |  105  |  11  ----------------------------<  295<H>  4.7   |  26  |  1.12    Ca    8.5      22 May 2017 07:17  Phos  2.5     05-22  Mg     2.2     05-22                CAPILLARY BLOOD GLUCOSE  287 (22 May 2017 07:30)  247 (21 May 2017 21:31)  335 (21 May 2017 17:44)  279 (21 May 2017 16:39)  262 (21 May 2017 11:39)        RADIOLOGY & ADDITIONAL TESTS:    CXR:  FINDINGS:  There is mild diffuse interstitial prominence.  No pneumothorax or pleural effusion.   The cardiac silhouette is enlarged and magnified by technique.    IMPRESSION:  Mild diffuse interstitial prominence.    ELIS SEARS M.D., ATTENDING RADIOLOGIST  This document has been electronically signed. May18 2017  8:46AM    Ct scan chest:    ekg;    echo: Patient is a 57y old  Male who presents with a chief complaint of hypoglycemia (17 May 2017 23:55)  Awake, alert, comfortable in NAD. OOB . Accuchecks still showing elevated BG but less than 300 mgs/dl.    INTERVAL HPI/OVERNIGHT EVENTS:      VITAL SIGNS:  T(F): 97.5  HR: 61  BP: 145/80  RR: 16  SpO2: 97%  Wt(kg): --  I&O's Detail          REVIEW OF SYSTEMS:    CONSTITUTIONAL:  No fevers, chills, sweats    HEENT:  Eyes:  No diplopia or blurred vision. ENT:  No earache, sore throat or runny nose.    CARDIOVASCULAR:  No pressure, squeezing, tightness, or heaviness about the chest; no palpitations.    RESPIRATORY:  Per HPI    GASTROINTESTINAL:  No abdominal pain, nausea, vomiting or diarrhea.    GENITOURINARY:  No dysuria, frequency or urgency.    NEUROLOGIC:  No paresthesias, fasciculations, seizures or weakness.    PSYCHIATRIC:  No disorder of thought or mood.      PHYSICAL EXAM:    Constitutional: Well developed and nourished  Eyes:Perrla  ENMT: normal  Neck:supple  Respiratory: good air entry  Cardiovascular: S1 S2 regular  Gastrointestinal: Soft, Non tender  Extremities: No edema  Vascular:normal  Neurological:Awake, alert,Ox3  Musculoskeletal:Normal      MEDICATIONS  (STANDING):  spironolactone 25milliGRAM(s) Oral daily  aspirin enteric coated 81milliGRAM(s) Oral daily  losartan 25milliGRAM(s) Oral daily  isosorbide   mononitrate ER Tablet (IMDUR) 30milliGRAM(s) Oral daily  topiramate 50milliGRAM(s) Oral two times a day  OXcarbazepine 600milliGRAM(s) Oral daily  FLUoxetine 40milliGRAM(s) Oral daily  atorvastatin 10milliGRAM(s) Oral at bedtime  dextrose 50% Injectable 12.5Gram(s) IV Push once  dextrose 50% Injectable 25Gram(s) IV Push once  dextrose 50% Injectable 25Gram(s) IV Push once  clopidogrel Tablet 75milliGRAM(s) Oral daily  metoprolol succinate ER 25milliGRAM(s) Oral daily  enoxaparin Injectable 40milliGRAM(s) SubCutaneous daily  pantoprazole    Tablet 40milliGRAM(s) Oral before breakfast  ranolazine 500milliGRAM(s) Oral two times a day  nicotine -  14 mG/24Hr(s) Patch 1patch Transdermal daily  silver sulfADIAZINE 1% Cream 1Application(s) Topical daily  furosemide    Tablet 40milliGRAM(s) Oral daily  potassium acid phosphate/sodium acid phosphate tablet (K-PHOS No. 2) 1Tablet(s) Oral four times a day with meals  dextrose 5%. 1000milliLiter(s) IV Continuous <Continuous>  insulin lispro (HumaLOG) corrective regimen sliding scale  SubCutaneous Before meals and at bedtime    MEDICATIONS  (PRN):  dextrose Gel 1Dose(s) Oral once PRN Blood Glucose LESS THAN 70 milliGRAM(s)/deciLiter  glucagon  Injectable 1milliGRAM(s) IntraMuscular once PRN Glucose <70 milliGRAM(s)/deciLiter  ALBUTerol    90 MICROgram(s) HFA Inhaler 2Puff(s) Inhalation every 6 hours PRN Shortness of Breath and/or Wheezing  sodium chloride 0.65% Nasal 1Spray(s) Both Nostrils every 8 hours PRN Nasal Congestion      Allergies    No Known Allergies    Intolerances        LABS:                        14.8   8.8   )-----------( 166      ( 22 May 2017 07:16 )             42.5     05-22    136  |  105  |  11  ----------------------------<  295<H>  4.7   |  26  |  1.12    Ca    8.5      22 May 2017 07:17  Phos  2.5     05-22  Mg     2.2     05-22                CAPILLARY BLOOD GLUCOSE  287 (22 May 2017 07:30)  247 (21 May 2017 21:31)  335 (21 May 2017 17:44)  279 (21 May 2017 16:39)  262 (21 May 2017 11:39)        RADIOLOGY & ADDITIONAL TESTS:    CXR:  FINDINGS:  There is mild diffuse interstitial prominence.  No pneumothorax or pleural effusion.   The cardiac silhouette is enlarged and magnified by technique.    IMPRESSION:  Mild diffuse interstitial prominence.    ELIS SEARS M.D., ATTENDING RADIOLOGIST  This document has been electronically signed. May18 2017  8:46AM    Ct scan chest:    ekg;    echo:

## 2017-05-22 NOTE — DISCHARGE NOTE ADULT - PLAN OF CARE
CONTROL OF BLOOD SUGARS HOLD INSULIN, C/W METFORMIN, INVOKANA, TRADJENTA AS PRESCRIBED c/w bipap at home c/w lasix, spironolactone, asa, plavix, imdur, ranexa c/w asa, plavix c/w home regimen, f/u outpatient psychiatrist

## 2017-05-22 NOTE — PROGRESS NOTE ADULT - SUBJECTIVE AND OBJECTIVE BOX
Interval Events:  s/p hypoglycemia now with hyperglycemia,non compliant with diet    Allergies    No Known Allergies    Intolerances      Endocrine/Metabolic Medications:  atorvastatin 10milliGRAM(s) Oral at bedtime  dextrose Gel 1Dose(s) Oral once PRN  dextrose 50% Injectable 12.5Gram(s) IV Push once  dextrose 50% Injectable 25Gram(s) IV Push once  dextrose 50% Injectable 25Gram(s) IV Push once  glucagon  Injectable 1milliGRAM(s) IntraMuscular once PRN  insulin lispro (HumaLOG) corrective regimen sliding scale  SubCutaneous Before meals and at bedtime      Vital Signs Last 24 Hrs  T(C): 36.4, Max: 36.6 (05-21 @ 22:46)  T(F): 97.5, Max: 97.8 (05-21 @ 22:46)  HR: 61 (53 - 61)  BP: 145/80 (97/49 - 145/80)  BP(mean): --  RR: 16 (15 - 16)  SpO2: 97% (96% - 98%)      PHYSICAL EXAM  All physical exam findings normal, except those marked:  General:	Alert, active, cooperative, NAD, well hydrated  .		[] Abnormal:  Neck		Normal: supple, no cervical adenopathy, no palpable thyroid  .		[] Abnormal:  Cardiovascular	Normal: regular rate, normal S1, S2, no murmurs  .		[] Abnormal:  Respiratory	Normal: no chest wall deformity, normal respiratory pattern, CTA B/L  .		[] Abnormal:  Abdominal	Normal: soft, ND, NT, bowel sounds present, no masses, no organomegaly  .		[] Abnormal:  		Normal normal genitalia, testes descended, circumcised/uncircumcised  .		Bruno stage:			Breast bruno:  .		Menstrual history:  .		[] Abnormal:  Extremities	Normal: FROM x4  .		[] Abnormal:  Skin		Normal: intact and not indurated, no rash, no acanthosis nigricans  .		[] Abnormal:  Neurologic	Normal: grossly intact  .		[] Abnormal:    LABS                        14.8   8.8   )-----------( 166      ( 22 May 2017 07:16 )             42.5                               136    |  105    |  11                  Calcium: 8.5   / iCa: x      (05-22 @ 07:17)    ----------------------------<  295       Magnesium: 2.2                              4.7     |  26     |  1.12             Phosphorous: 2.5        CAPILLARY BLOOD GLUCOSE  287 (22 May 2017 07:30)  247 (21 May 2017 21:31)  335 (21 May 2017 17:44)  279 (21 May 2017 16:39)  262 (21 May 2017 11:39)        Assesment/plan    S/P hypoglycemia due to exogenous insulin overdose  now hyperglycemic  Start metformin 1000 bid  invokana 300 qd  tradjenta 5mg qd  fsg ac and hs  compliance emphasized

## 2017-05-22 NOTE — DISCHARGE NOTE ADULT - MEDICATION SUMMARY - MEDICATIONS TO TAKE
I will START or STAY ON the medications listed below when I get home from the hospital:    spironolactone 25 mg oral tablet  -- 1 tab(s) by mouth once a day  -- Indication: For Congestive heart failure    aspirin 81 mg oral delayed release tablet  -- 1 tab(s) by mouth once a day  -- Indication: For Coronary artery disease without angina pectoris, unspecified vessel or lesion type, unspecified whether native or transplanted heart    losartan 25 mg oral tablet  -- 1 tab(s) by mouth once a day  -- Indication: For HTN    isosorbide mononitrate 30 mg oral tablet, extended release  -- 1 tab(s) by mouth once a day  -- Indication: For CAD    ranolazine 500 mg oral tablet, extended release  -- 1 tab(s) by mouth 2 times a day  -- Indication: For CAD    OXcarbazepine 600 mg oral tablet  -- 1 tab(s) by mouth once a day  -- Indication: For Bipolar disorder    topiramate 50 mg oral tablet  -- 1 tab(s) by mouth 2 times a day  -- Indication: For Bipolar disorder    FLUoxetine 40 mg oral capsule  -- 1 cap(s) by mouth once a day  -- Indication: For Bipolar disorder    Glucophage 1000 mg oral tablet  -- 1 tab(s) by mouth 2 times a day  -- Check with your doctor before becoming pregnant.  Do not drink alcoholic beverages when taking this medication.  It is very important that you take or use this exactly as directed.  Do not skip doses or discontinue unless directed by your doctor.  Obtain medical advice before taking any non-prescription drugs as some may affect the action of this medication.  Take with food or milk.    -- Indication: For DM    Tradjenta 5 mg oral tablet  -- 1 tab(s) by mouth once a day  -- Check with your doctor before becoming pregnant.  Obtain medical advice before taking any non-prescription drugs as some may affect the action of this medication.    -- Indication: For DM    canagliflozin 300 mg oral tablet  -- 1 tab(s) by mouth once a day  -- Check with your doctor before becoming pregnant.  Medication should be taken with plenty of water.  Obtain medical advice before taking any non-prescription drugs as some may affect the action of this medication.    -- Indication: For DM    atorvastatin 10 mg oral tablet  -- 1 tab(s) by mouth once a day (at bedtime)  -- Indication: For HLD    clopidogrel 75 mg oral tablet  -- 1 tab(s) by mouth once a day  -- Indication: For CAD    metoprolol succinate 25 mg oral tablet, extended release  -- 1 tab(s) by mouth once a day  -- Indication: For HTN    albuterol 90 mcg/inh inhalation aerosol  -- 2 puff(s) inhaled every 6 hours, As needed, Shortness of Breath and/or Wheezing  -- Indication: For JONAH treated with BiPAP    furosemide 40 mg oral tablet  -- 1 tab(s) by mouth once a day  -- Indication: For CHF    nicotine 14 mg/24 hr transdermal film, extended release  -- 1 patch by transdermal patch once a day  -- Indication: For Smoker

## 2017-05-22 NOTE — PROGRESS NOTE ADULT - SUBJECTIVE AND OBJECTIVE BOX
Patient is a 57y old  Male who presents with a chief complaint of hypoglycemia (17 May 2017 23:55)    pt seen in icu [  ], reg med floor [ x  ], bed [ x ], chair at bedside [   ], a+o x3 [x  ], lethargic [  ],  nad [x  ]        Allergies    No Known Allergies        Vitals    T(F): 97.5, Max: 97.8 (05-21 @ 22:46)  HR: 61 (53 - 61)  BP: 145/80 (97/49 - 145/80)  RR: 16 (15 - 16)  SpO2: 97% (96% - 98%)  Wt(kg): --  CAPILLARY BLOOD GLUCOSE  287 (22 May 2017 07:30)      Labs                          14.8   8.8   )-----------( 166      ( 22 May 2017 07:16 )             42.5       05-22    136  |  105  |  11  ----------------------------<  295<H>  4.7   |  26  |  1.12    Ca    8.5      22 May 2017 07:17  Phos  2.5     05-22  Mg     2.2     05-22              .Urine Clean Catch (Midstream)  03-11 @ 00:09   No growth  --  --      .Blood Blood  03-10 @ 18:34   Growth in anaerobic bottle: Streptococcus agalactiae (Group B)  --  Streptococcus agalactiae (Group B)          Radiology Results      Meds    MEDICATIONS  (STANDING):  spironolactone 25milliGRAM(s) Oral daily  aspirin enteric coated 81milliGRAM(s) Oral daily  losartan 25milliGRAM(s) Oral daily  isosorbide   mononitrate ER Tablet (IMDUR) 30milliGRAM(s) Oral daily  topiramate 50milliGRAM(s) Oral two times a day  OXcarbazepine 600milliGRAM(s) Oral daily  FLUoxetine 40milliGRAM(s) Oral daily  atorvastatin 10milliGRAM(s) Oral at bedtime  dextrose 50% Injectable 12.5Gram(s) IV Push once  dextrose 50% Injectable 25Gram(s) IV Push once  dextrose 50% Injectable 25Gram(s) IV Push once  clopidogrel Tablet 75milliGRAM(s) Oral daily  metoprolol succinate ER 25milliGRAM(s) Oral daily  enoxaparin Injectable 40milliGRAM(s) SubCutaneous daily  pantoprazole    Tablet 40milliGRAM(s) Oral before breakfast  ranolazine 500milliGRAM(s) Oral two times a day  nicotine -  14 mG/24Hr(s) Patch 1patch Transdermal daily  silver sulfADIAZINE 1% Cream 1Application(s) Topical daily  furosemide    Tablet 40milliGRAM(s) Oral daily  potassium acid phosphate/sodium acid phosphate tablet (K-PHOS No. 2) 1Tablet(s) Oral four times a day with meals  dextrose 5%. 1000milliLiter(s) IV Continuous <Continuous>  insulin lispro (HumaLOG) corrective regimen sliding scale  SubCutaneous Before meals and at bedtime      MEDICATIONS  (PRN):  dextrose Gel 1Dose(s) Oral once PRN Blood Glucose LESS THAN 70 milliGRAM(s)/deciLiter  glucagon  Injectable 1milliGRAM(s) IntraMuscular once PRN Glucose <70 milliGRAM(s)/deciLiter  ALBUTerol    90 MICROgram(s) HFA Inhaler 2Puff(s) Inhalation every 6 hours PRN Shortness of Breath and/or Wheezing  sodium chloride 0.65% Nasal 1Spray(s) Both Nostrils every 8 hours PRN Nasal Congestion      Physical Exam    Neuro :  no focal deficits  Respiratory: CTA B/L  CV: RRR, S1S2, no murmurs,   Abdominal: Soft, NT, ND +BS,  Extremities: No edema, + peripheral pulses    ASSESSMENT    recurrent Hypoglycemia  Diabetes Mellitus Type 2 in Obese  Smoker  JONAH treated with BiPAP  Arthritis  CAD (coronary artery disease)  Congestive heart failure  Bipolar disorder  Abdominal Obesity  Umbilical Hernia without Obstruction or Gangrene      PLAN     Likely secondary to excessive insulin administration vs insulin sensitivity with recurrent hypoglycemic episodes while on insulin.  s/p multiple doses of D50 given.   endocrinology f/u   insulin high and c peptide levels low.  diet changed to diabetic diet  f/u sulfonyl urea levels.  pulm cons f/u  psychiatrist f/u  obtain information from primary psychiatrist, Dr. Burgess [phone].  Patient denied any psych hx to Dr. choi,  cont current meds

## 2017-05-22 NOTE — DISCHARGE NOTE ADULT - CARE PROVIDER_API CALL
Ana Aguiar (BILLIE), EndocrinologyMetabDiabetes  [id number] [address] [place] [address]  Phone: [phone]  Fax: [phone]

## 2017-05-22 NOTE — DISCHARGE NOTE ADULT - CARE PLAN
Principal Discharge DX:	Hypoglycemia  Goal:	CONTROL OF BLOOD SUGARS  Instructions for follow-up, activity and diet:	HOLD INSULIN, C/W METFORMIN, INVOKANA, TRADJENTA AS PRESCRIBED  Secondary Diagnosis:	JONAH treated with BiPAP  Instructions for follow-up, activity and diet:	c/w bipap at home  Secondary Diagnosis:	Chronic diastolic congestive heart failure  Instructions for follow-up, activity and diet:	c/w lasix, spironolactone, asa, plavix, imdur, ranexa  Secondary Diagnosis:	Coronary artery disease without angina pectoris, unspecified vessel or lesion type, unspecified whether native or transplanted heart  Instructions for follow-up, activity and diet:	c/w asa, plavix  Secondary Diagnosis:	Bipolar affective disorder, remission status unspecified  Instructions for follow-up, activity and diet:	c/w home regimen, f/u outpatient psychiatrist

## 2017-05-22 NOTE — DISCHARGE NOTE ADULT - PATIENT PORTAL LINK FT
“You can access the [employer] Patient Portal, offered by [facility], by registering with the following website: [email or web address]”

## 2017-05-22 NOTE — DISCHARGE NOTE ADULT - MEDICATION SUMMARY - MEDICATIONS TO CHANGE
I will SWITCH the dose or number of times a day I take the medications listed below when I get home from the hospital:    Lopressor  -- 25 milligram(s) by mouth once a day    metFORMIN 500 mg oral tablet  -- 1 tab(s) by mouth 2 times a day  -- Check with your doctor before becoming pregnant.  Do not drink alcoholic beverages when taking this medication.  It is very important that you take or use this exactly as directed.  Do not skip doses or discontinue unless directed by your doctor.  Obtain medical advice before taking any non-prescription drugs as some may affect the action of this medication.  Take with food or milk.

## 2017-05-22 NOTE — DISCHARGE NOTE ADULT - HOSPITAL COURSE
57 obese male from home, lives with wife, with PMH of CAD with stent x1( august 2016), CHF, arthritis, HTN, DM, JONAH (on BIPAP), bipolar disorder presented to the ED with complaint of weakness and hypoglycemia. As per the patient he developed weakness 2 days ago. He checked his blood glucose and it was 77. He decided to hold his insulin dose due to the hypoglycemia. He again felt weak associated with mild nausea and noted hypoglycemia to 40's. On arrival to the ED, his BG was 49. He received D50 x 3 in ED with persistent hypoglycemia to 45 again, which later improved to 76. He was started on D10 in the ED with improvement of BG to 207. The patient was recently discharged on 3/13/17 after hospitalization for hypoglycemia. The patient has several admissions due to recurrent hypoglycemia. Insulin and cpeptide levels were both elevated on previous admission. A1C in 3/2017 was 7.7. Admitted to ICU for recurrent hypoglycemia- Concern for excessive insulin administration vs insulin sensitivity while on insulin.    1)Recurrent Hypoglycemia.   Likely secondary to excessive insulin administration vs insulin sensitivity with recurrent hypoglycemic episodes while on insulin.  s/p multiple doses of D50 given.   Accuchecks today are- [phone], initially was On D5 at 75cc/hr with D10 at 30cc/hr  in 2 IV lines on both limbs., since finger sticks are still on the lower side, started D 10 3rd line, and d/c D5w. later on the day, finger sticks improved to above 200.   insulin and oral antihypoglycemic medications were held initally  blood glucose levels above 200 >3 readings, HISS was restarted  endocrinology, Dr. Aguiar recommended  to obtain cortisol levels if persistant hypoglycemia as well as repeat c-peptide and insulin levels also if patient is hypotensive, which were sent  and on admission which s/o elevated insulin and low c peptide levels.  On regular diet, change to diabetic diet once hypoglycemia resolves.  -Ct head was done on admission- negative for acute pathology.  -Hba1c- 8.9.  -per endo Dr Aguiar, will dc on metformin 1g bid, invokana 300 mg od and tradjenta 5mg od, no insulin  Patient was found to be Hypokalemic on admission likely secondary to excess insulin intake.  K level 2.9 on admission. Patient received 40meq PO and 10meq x 3 IV-> 3.2, K levels are normal.     2)CAD with CHF  -Continue home medications aspirin, metoprolol, imdur, lipitor, ranexa, spironolactone.  Echo -march 2017 s/o EF of 55-60% with G 1 DD.     3) JONAH   Patient has history of JONAH, noncompliant with BIPAP.      4)Bipolar disorder  Patient takes fluoxetine, topiramate and trileptal at home, continued home medications.psychiatrist- Dr. choi was consulted, who recommended to get more information from primary psychiatrist, tried to reach Dr. Burgess [phone].  Patient denied any psych hx to Dr. choi, tried to reach primary psychiatrist but no  answer. f/u outpatient psych    5)Smoker.    Patient is active smoker, trying to quit. Patient counseled on smoking cessation and agreed to nicotine patch.     Patient medically ready for dc today per attending.

## 2017-05-24 DIAGNOSIS — I50.32 CHRONIC DIASTOLIC (CONGESTIVE) HEART FAILURE: ICD-10-CM

## 2017-05-24 DIAGNOSIS — Z95.5 PRESENCE OF CORONARY ANGIOPLASTY IMPLANT AND GRAFT: ICD-10-CM

## 2017-05-24 DIAGNOSIS — Z79.84 LONG TERM (CURRENT) USE OF ORAL HYPOGLYCEMIC DRUGS: ICD-10-CM

## 2017-05-24 DIAGNOSIS — Z79.4 LONG TERM (CURRENT) USE OF INSULIN: ICD-10-CM

## 2017-05-24 DIAGNOSIS — E11.65 TYPE 2 DIABETES MELLITUS WITH HYPERGLYCEMIA: ICD-10-CM

## 2017-05-24 DIAGNOSIS — E87.6 HYPOKALEMIA: ICD-10-CM

## 2017-05-24 DIAGNOSIS — Z91.14 PATIENT'S OTHER NONCOMPLIANCE WITH MEDICATION REGIMEN: ICD-10-CM

## 2017-05-24 DIAGNOSIS — E66.01 MORBID (SEVERE) OBESITY DUE TO EXCESS CALORIES: ICD-10-CM

## 2017-05-24 DIAGNOSIS — F31.9 BIPOLAR DISORDER, UNSPECIFIED: ICD-10-CM

## 2017-05-24 DIAGNOSIS — Z79.82 LONG TERM (CURRENT) USE OF ASPIRIN: ICD-10-CM

## 2017-05-24 DIAGNOSIS — I25.10 ATHEROSCLEROTIC HEART DISEASE OF NATIVE CORONARY ARTERY WITHOUT ANGINA PECTORIS: ICD-10-CM

## 2017-05-24 DIAGNOSIS — M19.90 UNSPECIFIED OSTEOARTHRITIS, UNSPECIFIED SITE: ICD-10-CM

## 2017-05-24 DIAGNOSIS — Z79.51 LONG TERM (CURRENT) USE OF INHALED STEROIDS: ICD-10-CM

## 2017-05-24 DIAGNOSIS — G47.33 OBSTRUCTIVE SLEEP APNEA (ADULT) (PEDIATRIC): ICD-10-CM

## 2017-05-24 DIAGNOSIS — F17.210 NICOTINE DEPENDENCE, CIGARETTES, UNCOMPLICATED: ICD-10-CM

## 2017-05-24 DIAGNOSIS — T38.3X1A POISONING BY INSULIN AND ORAL HYPOGLYCEMIC [ANTIDIABETIC] DRUGS, ACCIDENTAL (UNINTENTIONAL), INITIAL ENCOUNTER: ICD-10-CM

## 2017-05-24 DIAGNOSIS — E11.649 TYPE 2 DIABETES MELLITUS WITH HYPOGLYCEMIA WITHOUT COMA: ICD-10-CM

## 2017-05-26 DIAGNOSIS — I11.0 HYPERTENSIVE HEART DISEASE WITH HEART FAILURE: ICD-10-CM

## 2017-06-05 LAB — CORTIS AM PEAK SERPL-MCNC: 12 UG/DL — SIGNIFICANT CHANGE UP (ref 6–18.4)

## 2017-07-09 NOTE — H&P ADULT - PROBLEM/PLAN-3
no abdominal pain, no bloating, no constipation, no diarrhea, no nausea and no vomiting. DISPLAY PLAN FREE TEXT

## 2018-04-05 ENCOUNTER — APPOINTMENT (OUTPATIENT)
Dept: ORTHOPEDIC SURGERY | Facility: CLINIC | Age: 58
End: 2018-04-05
Payer: MEDICARE

## 2018-04-05 VITALS
HEART RATE: 71 BPM | DIASTOLIC BLOOD PRESSURE: 85 MMHG | SYSTOLIC BLOOD PRESSURE: 132 MMHG | BODY MASS INDEX: 38.65 KG/M2 | HEIGHT: 70 IN | WEIGHT: 270 LBS

## 2018-04-05 DIAGNOSIS — M25.561 PAIN IN RIGHT KNEE: ICD-10-CM

## 2018-04-05 DIAGNOSIS — M17.11 UNILATERAL PRIMARY OSTEOARTHRITIS, RIGHT KNEE: ICD-10-CM

## 2018-04-05 PROCEDURE — 73562 X-RAY EXAM OF KNEE 3: CPT | Mod: RT

## 2018-04-05 PROCEDURE — 99214 OFFICE O/P EST MOD 30 MIN: CPT | Mod: 25

## 2018-04-05 PROCEDURE — 20610 DRAIN/INJ JOINT/BURSA W/O US: CPT | Mod: RT

## 2018-04-12 PROBLEM — M25.561 ACUTE PAIN OF RIGHT KNEE: Status: ACTIVE | Noted: 2018-04-12

## 2018-04-12 PROBLEM — M17.11 PRIMARY LOCALIZED OSTEOARTHRITIS OF RIGHT KNEE: Status: ACTIVE | Noted: 2018-04-12

## 2018-10-04 ENCOUNTER — INPATIENT (INPATIENT)
Facility: HOSPITAL | Age: 58
LOS: 1 days | Discharge: HOME | DRG: 287 | End: 2018-10-06
Attending: HOSPITALIST | Admitting: HOSPITALIST
Payer: COMMERCIAL

## 2018-10-04 ENCOUNTER — APPOINTMENT (OUTPATIENT)
Dept: CARDIOLOGY | Facility: CLINIC | Age: 58
End: 2018-10-04

## 2018-10-04 VITALS
OXYGEN SATURATION: 92 % | TEMPERATURE: 98 F | SYSTOLIC BLOOD PRESSURE: 134 MMHG | HEIGHT: 72 IN | RESPIRATION RATE: 18 BRPM | WEIGHT: 259.93 LBS | DIASTOLIC BLOOD PRESSURE: 66 MMHG | HEART RATE: 70 BPM

## 2018-10-04 DIAGNOSIS — I25.118 ATHEROSCLEROTIC HEART DISEASE OF NATIVE CORONARY ARTERY WITH OTHER FORMS OF ANGINA PECTORIS: ICD-10-CM

## 2018-10-04 DIAGNOSIS — F31.9 BIPOLAR DISORDER, UNSPECIFIED: ICD-10-CM

## 2018-10-04 DIAGNOSIS — I10 ESSENTIAL (PRIMARY) HYPERTENSION: ICD-10-CM

## 2018-10-04 DIAGNOSIS — E11.649 TYPE 2 DIABETES MELLITUS WITH HYPOGLYCEMIA WITHOUT COMA: ICD-10-CM

## 2018-10-04 DIAGNOSIS — E11.69 TYPE 2 DIABETES MELLITUS WITH OTHER SPECIFIED COMPLICATION: ICD-10-CM

## 2018-10-04 DIAGNOSIS — E16.2 HYPOGLYCEMIA, UNSPECIFIED: ICD-10-CM

## 2018-10-04 DIAGNOSIS — Z29.9 ENCOUNTER FOR PROPHYLACTIC MEASURES, UNSPECIFIED: ICD-10-CM

## 2018-10-04 DIAGNOSIS — R94.39 ABNORMAL RESULT OF OTHER CARDIOVASCULAR FUNCTION STUDY: ICD-10-CM

## 2018-10-04 DIAGNOSIS — E78.5 HYPERLIPIDEMIA, UNSPECIFIED: ICD-10-CM

## 2018-10-04 LAB
ALBUMIN SERPL ELPH-MCNC: 4.1 G/DL — SIGNIFICANT CHANGE UP (ref 3.3–5)
ALP SERPL-CCNC: 93 U/L — SIGNIFICANT CHANGE UP (ref 40–120)
ALT FLD-CCNC: 15 U/L — SIGNIFICANT CHANGE UP (ref 10–45)
ANION GAP SERPL CALC-SCNC: 9 MMOL/L — SIGNIFICANT CHANGE UP (ref 5–17)
AST SERPL-CCNC: 14 U/L — SIGNIFICANT CHANGE UP (ref 10–40)
BILIRUB SERPL-MCNC: 0.3 MG/DL — SIGNIFICANT CHANGE UP (ref 0.2–1.2)
BUN SERPL-MCNC: 12 MG/DL — SIGNIFICANT CHANGE UP (ref 7–23)
CALCIUM SERPL-MCNC: 9.7 MG/DL — SIGNIFICANT CHANGE UP (ref 8.4–10.5)
CHLORIDE SERPL-SCNC: 105 MMOL/L — SIGNIFICANT CHANGE UP (ref 96–108)
CO2 SERPL-SCNC: 29 MMOL/L — SIGNIFICANT CHANGE UP (ref 22–31)
CREAT SERPL-MCNC: 0.99 MG/DL — SIGNIFICANT CHANGE UP (ref 0.5–1.3)
GLUCOSE BLDC GLUCOMTR-MCNC: 100 MG/DL — HIGH (ref 70–99)
GLUCOSE BLDC GLUCOMTR-MCNC: 101 MG/DL — HIGH (ref 70–99)
GLUCOSE BLDC GLUCOMTR-MCNC: 104 MG/DL — HIGH (ref 70–99)
GLUCOSE BLDC GLUCOMTR-MCNC: 111 MG/DL — HIGH (ref 70–99)
GLUCOSE BLDC GLUCOMTR-MCNC: 119 MG/DL — HIGH (ref 70–99)
GLUCOSE BLDC GLUCOMTR-MCNC: 124 MG/DL — HIGH (ref 70–99)
GLUCOSE BLDC GLUCOMTR-MCNC: 145 MG/DL — HIGH (ref 70–99)
GLUCOSE BLDC GLUCOMTR-MCNC: 53 MG/DL — LOW (ref 70–99)
GLUCOSE BLDC GLUCOMTR-MCNC: 55 MG/DL — LOW (ref 70–99)
GLUCOSE BLDC GLUCOMTR-MCNC: 63 MG/DL — LOW (ref 70–99)
GLUCOSE BLDC GLUCOMTR-MCNC: 63 MG/DL — LOW (ref 70–99)
GLUCOSE BLDC GLUCOMTR-MCNC: 65 MG/DL — LOW (ref 70–99)
GLUCOSE BLDC GLUCOMTR-MCNC: 66 MG/DL — LOW (ref 70–99)
GLUCOSE BLDC GLUCOMTR-MCNC: 68 MG/DL — LOW (ref 70–99)
GLUCOSE BLDC GLUCOMTR-MCNC: 71 MG/DL — SIGNIFICANT CHANGE UP (ref 70–99)
GLUCOSE BLDC GLUCOMTR-MCNC: 76 MG/DL — SIGNIFICANT CHANGE UP (ref 70–99)
GLUCOSE BLDC GLUCOMTR-MCNC: 76 MG/DL — SIGNIFICANT CHANGE UP (ref 70–99)
GLUCOSE BLDC GLUCOMTR-MCNC: 83 MG/DL — SIGNIFICANT CHANGE UP (ref 70–99)
GLUCOSE BLDC GLUCOMTR-MCNC: 85 MG/DL — SIGNIFICANT CHANGE UP (ref 70–99)
GLUCOSE BLDC GLUCOMTR-MCNC: 85 MG/DL — SIGNIFICANT CHANGE UP (ref 70–99)
GLUCOSE BLDC GLUCOMTR-MCNC: 89 MG/DL — SIGNIFICANT CHANGE UP (ref 70–99)
GLUCOSE BLDC GLUCOMTR-MCNC: 90 MG/DL — SIGNIFICANT CHANGE UP (ref 70–99)
GLUCOSE BLDC GLUCOMTR-MCNC: 91 MG/DL — SIGNIFICANT CHANGE UP (ref 70–99)
GLUCOSE BLDC GLUCOMTR-MCNC: 94 MG/DL — SIGNIFICANT CHANGE UP (ref 70–99)
GLUCOSE BLDC GLUCOMTR-MCNC: 95 MG/DL — SIGNIFICANT CHANGE UP (ref 70–99)
GLUCOSE BLDC GLUCOMTR-MCNC: 97 MG/DL — SIGNIFICANT CHANGE UP (ref 70–99)
GLUCOSE SERPL-MCNC: 52 MG/DL — LOW (ref 70–99)
HBA1C BLD-MCNC: 9.2 % — HIGH (ref 4–5.6)
HCT VFR BLD CALC: 53.6 % — HIGH (ref 39–50)
HGB BLD-MCNC: 17.3 G/DL — HIGH (ref 13–17)
MCHC RBC-ENTMCNC: 31.1 PG — SIGNIFICANT CHANGE UP (ref 27–34)
MCHC RBC-ENTMCNC: 32.2 GM/DL — SIGNIFICANT CHANGE UP (ref 32–36)
MCV RBC AUTO: 96.6 FL — SIGNIFICANT CHANGE UP (ref 80–100)
PLATELET # BLD AUTO: 224 K/UL — SIGNIFICANT CHANGE UP (ref 150–400)
POTASSIUM SERPL-MCNC: 3.5 MMOL/L — SIGNIFICANT CHANGE UP (ref 3.5–5.3)
POTASSIUM SERPL-SCNC: 3.5 MMOL/L — SIGNIFICANT CHANGE UP (ref 3.5–5.3)
PROT SERPL-MCNC: 7.9 G/DL — SIGNIFICANT CHANGE UP (ref 6–8.3)
RBC # BLD: 5.55 M/UL — SIGNIFICANT CHANGE UP (ref 4.2–5.8)
RBC # FLD: 14.1 % — SIGNIFICANT CHANGE UP (ref 10.3–14.5)
SODIUM SERPL-SCNC: 143 MMOL/L — SIGNIFICANT CHANGE UP (ref 135–145)
WBC # BLD: 12.9 K/UL — HIGH (ref 3.8–10.5)
WBC # FLD AUTO: 12.9 K/UL — HIGH (ref 3.8–10.5)

## 2018-10-04 PROCEDURE — 99152 MOD SED SAME PHYS/QHP 5/>YRS: CPT | Mod: GC

## 2018-10-04 PROCEDURE — 99223 1ST HOSP IP/OBS HIGH 75: CPT

## 2018-10-04 PROCEDURE — 93010 ELECTROCARDIOGRAM REPORT: CPT

## 2018-10-04 PROCEDURE — 93454 CORONARY ARTERY ANGIO S&I: CPT | Mod: 26,GC

## 2018-10-04 RX ORDER — DEXTROSE 50 % IN WATER 50 %
25 SYRINGE (ML) INTRAVENOUS ONCE
Qty: 0 | Refills: 0 | Status: DISCONTINUED | OUTPATIENT
Start: 2018-10-04 | End: 2018-10-06

## 2018-10-04 RX ORDER — SODIUM CHLORIDE 9 MG/ML
1000 INJECTION, SOLUTION INTRAVENOUS
Qty: 0 | Refills: 0 | Status: DISCONTINUED | OUTPATIENT
Start: 2018-10-04 | End: 2018-10-04

## 2018-10-04 RX ORDER — ATORVASTATIN CALCIUM 80 MG/1
40 TABLET, FILM COATED ORAL AT BEDTIME
Qty: 0 | Refills: 0 | Status: DISCONTINUED | OUTPATIENT
Start: 2018-10-04 | End: 2018-10-06

## 2018-10-04 RX ORDER — NICOTINE POLACRILEX 2 MG
1 GUM BUCCAL DAILY
Qty: 0 | Refills: 0 | Status: DISCONTINUED | OUTPATIENT
Start: 2018-10-04 | End: 2018-10-06

## 2018-10-04 RX ORDER — ASPIRIN/CALCIUM CARB/MAGNESIUM 324 MG
81 TABLET ORAL DAILY
Qty: 0 | Refills: 0 | Status: DISCONTINUED | OUTPATIENT
Start: 2018-10-04 | End: 2018-10-06

## 2018-10-04 RX ORDER — DIVALPROEX SODIUM 500 MG/1
500 TABLET, DELAYED RELEASE ORAL
Qty: 0 | Refills: 0 | Status: DISCONTINUED | OUTPATIENT
Start: 2018-10-04 | End: 2018-10-06

## 2018-10-04 RX ORDER — GLUCAGON INJECTION, SOLUTION 0.5 MG/.1ML
1 INJECTION, SOLUTION SUBCUTANEOUS ONCE
Qty: 0 | Refills: 0 | Status: DISCONTINUED | OUTPATIENT
Start: 2018-10-04 | End: 2018-10-06

## 2018-10-04 RX ORDER — DEXTROSE 50 % IN WATER 50 %
15 SYRINGE (ML) INTRAVENOUS ONCE
Qty: 0 | Refills: 0 | Status: COMPLETED | OUTPATIENT
Start: 2018-10-04 | End: 2018-10-04

## 2018-10-04 RX ORDER — ESCITALOPRAM OXALATE 10 MG/1
10 TABLET, FILM COATED ORAL DAILY
Qty: 0 | Refills: 0 | Status: DISCONTINUED | OUTPATIENT
Start: 2018-10-04 | End: 2018-10-06

## 2018-10-04 RX ORDER — SODIUM CHLORIDE 9 MG/ML
3 INJECTION INTRAMUSCULAR; INTRAVENOUS; SUBCUTANEOUS EVERY 8 HOURS
Qty: 0 | Refills: 0 | Status: DISCONTINUED | OUTPATIENT
Start: 2018-10-04 | End: 2018-10-06

## 2018-10-04 RX ORDER — DEXTROSE 50 % IN WATER 50 %
25 SYRINGE (ML) INTRAVENOUS ONCE
Qty: 0 | Refills: 0 | Status: COMPLETED | OUTPATIENT
Start: 2018-10-04 | End: 2018-10-04

## 2018-10-04 RX ORDER — DEXTROSE 10 % IN WATER 10 %
1000 INTRAVENOUS SOLUTION INTRAVENOUS
Qty: 0 | Refills: 0 | Status: DISCONTINUED | OUTPATIENT
Start: 2018-10-04 | End: 2018-10-05

## 2018-10-04 RX ORDER — SODIUM CHLORIDE 9 MG/ML
1000 INJECTION, SOLUTION INTRAVENOUS
Qty: 0 | Refills: 0 | Status: DISCONTINUED | OUTPATIENT
Start: 2018-10-04 | End: 2018-10-05

## 2018-10-04 RX ORDER — DEXTROSE 50 % IN WATER 50 %
15 SYRINGE (ML) INTRAVENOUS ONCE
Qty: 0 | Refills: 0 | Status: DISCONTINUED | OUTPATIENT
Start: 2018-10-04 | End: 2018-10-06

## 2018-10-04 RX ORDER — DIVALPROEX SODIUM 500 MG/1
1 TABLET, DELAYED RELEASE ORAL
Qty: 0 | Refills: 0 | COMMUNITY

## 2018-10-04 RX ORDER — DEXTROSE 50 % IN WATER 50 %
12.5 SYRINGE (ML) INTRAVENOUS ONCE
Qty: 0 | Refills: 0 | Status: DISCONTINUED | OUTPATIENT
Start: 2018-10-04 | End: 2018-10-06

## 2018-10-04 RX ORDER — INFLUENZA VIRUS VACCINE 15; 15; 15; 15 UG/.5ML; UG/.5ML; UG/.5ML; UG/.5ML
0.5 SUSPENSION INTRAMUSCULAR ONCE
Qty: 0 | Refills: 0 | Status: DISCONTINUED | OUTPATIENT
Start: 2018-10-04 | End: 2018-10-06

## 2018-10-04 RX ORDER — ESCITALOPRAM OXALATE 10 MG/1
1 TABLET, FILM COATED ORAL
Qty: 0 | Refills: 0 | COMMUNITY

## 2018-10-04 RX ADMIN — ATORVASTATIN CALCIUM 40 MILLIGRAM(S): 80 TABLET, FILM COATED ORAL at 21:41

## 2018-10-04 RX ADMIN — SODIUM CHLORIDE 3 MILLILITER(S): 9 INJECTION INTRAMUSCULAR; INTRAVENOUS; SUBCUTANEOUS at 13:56

## 2018-10-04 RX ADMIN — Medication 1 PATCH: at 17:25

## 2018-10-04 RX ADMIN — ESCITALOPRAM OXALATE 10 MILLIGRAM(S): 10 TABLET, FILM COATED ORAL at 14:57

## 2018-10-04 RX ADMIN — Medication 25 MILLILITER(S): at 12:45

## 2018-10-04 RX ADMIN — Medication 25 MILLILITER(S): at 06:55

## 2018-10-04 RX ADMIN — Medication 15 GRAM(S): at 09:10

## 2018-10-04 RX ADMIN — DIVALPROEX SODIUM 500 MILLIGRAM(S): 500 TABLET, DELAYED RELEASE ORAL at 17:25

## 2018-10-04 RX ADMIN — Medication 81 MILLIGRAM(S): at 14:57

## 2018-10-04 RX ADMIN — SODIUM CHLORIDE 3 MILLILITER(S): 9 INJECTION INTRAMUSCULAR; INTRAVENOUS; SUBCUTANEOUS at 21:40

## 2018-10-04 NOTE — H&P CARDIOLOGY - FAMILY HISTORY
Mother  Still living? No  Family history of MI (myocardial infarction), Age at diagnosis: Age Unknown

## 2018-10-04 NOTE — H&P CARDIOLOGY - RS GEN PE MLT RESP DETAILS PC
respirations non-labored/no subcutaneous emphysema/no rhonchi/no rales/no wheezes/clear to auscultation bilaterally

## 2018-10-04 NOTE — CHART NOTE - NSCHARTNOTEFT_GEN_A_CORE
Patient has been hypoglycemic  since arrival to hospital today,  FS at 6:55 am was 55 - 1/2 amp of D50 IVP was given , 7:10 FS 97    OF NOTE: pt states he took Soliqua 30 units last night ( 1/2 of his usual dose, admits to be non compliant with his dietary restrictions, never checks finger sticks at home, last A1C was 10.8, has endocrinologist , Dr. JASPAL Aguiar in [place], which he saw " a few months ago", and has had hypoglycemia in the past for which he was hospitalized)    7:20 am FS 76 was given food (sandwiche and appled juice- a s per nurse)   7:40am FS 67 ( despite PO intake) and was started on D5 NS at 50cc hr   8am FS 63, was given another 1/2 amp of D50 push   ( all as per nurse documentation)    Since arrival to [facility] hypoglycemia has been under the care of NP, Michelle Gerardo  I personally took over care at 9 am. I met pt for the first time them s/p dx cardiac cath, patient completely asymptomatic ( denies symptoms of hypoglycemia, however states that he " can't never feel when his sugars are low",  FS at that time was 64 for which he received PO Glutose   endocrinology consult called twice without response, spoke to Dr Cook at 10:18 am , explained above situation and suggested fluids to be switched to D10 at 100cc/hr, cont to check FS, treat hypoglycemia as per protocol, official endocrinology consult pending.    Spoke to patient and wife, they both understand and agree for Mr Medina to stay in hospital at this time until hypoglycemia is resolved. They both verb understanding of how dangerous and life threatening hypoglycemia is and agree to wait until FS stabilize and they see endocrinologist . DR Lewis made aware of above situation as well Patient has been hypoglycemic  since arrival to hospital today,  FS at 6:55 am was 55 - 1/2 amp of D50 IVP was given , 7:10 FS 97    OF NOTE: pt states he took Soliqua 30 units last night ( 1/2 of his usual dose, admits to be non compliant with his dietary restrictions, never checks finger sticks at home, last A1C was 10.8, has endocrinologist , Dr. JASPAL Aguiar in [place], which he saw " a few months ago", and has had hypoglycemia in the past for which he was hospitalized)    7:20 am FS 76 was given food (sandwiche and appled juice- a s per nurse)   7:40am FS 67 ( despite PO intake) and was started on D5 NS at 50cc hr   8am FS 63, was given another 1/2 amp of D50 push   ( all as per nurse documentation)    Since arrival to [facility] hypoglycemia has been under the care of NP, Michelle Gerardo  I personally took over care at 9 am. I met pt for the first time them s/p dx cardiac cath, patient completely asymptomatic ( denies symptoms of hypoglycemia, however states that he " can't never feel when his sugars are low",  FS at that time was 64 for which he received PO Glutose   endocrinology consult called twice without response, spoke to Dr Cook at 10:18 am , explained above situation and suggested fluids to be switched to D10 at 100cc/hr, cont to check FS, treat hypoglycemia as per protocol, official endocrinology consult pending.    Spoke to patient and wife, they both understand and agree for Mr Medina to stay in hospital at this time until hypoglycemia is resolved. They both verb understanding of how dangerous and life threatening hypoglycemia is and agree to wait until FS stabilize and they see endocrinologist . DR Lewis made aware of above situation as well    Addendum to above note: patient and wife signed AMA documentation and wanted to leave despite multiple attempts of explaining why leaving hosp would be dangerous and life threatening.  I personally spoke to pt's endocrinologist Dr Aguiar, whom also agrees patient should be admitted.  After again speaking to patient and wife multiple time and having wife speak to Dr Aguiar they finally agreed to stay instead.   ahd off report given to [facility] NP Sloane Rogel  patient cont to be asymptomatic, on D10 at 100 cc/hr last  Patient has been hypoglycemic  since arrival to hospital today,  FS at 6:55 am was 55 - 1/2 amp of D50 IVP was given , 7:10 FS 97    OF NOTE: pt states he took Soliqua 30 units last night ( 1/2 of his usual dose, admits to be non compliant with his dietary restrictions, never checks finger sticks at home, last A1C was 10.8, has endocrinologist , Dr. JASPAL Aguiar in [place], which he saw " a few months ago", and has had hypoglycemia in the past for which he was hospitalized)    7:20 am FS 76 was given food (sandwiche and appled juice- a s per nurse)   7:40am FS 67 ( despite PO intake) and was started on D5 NS at 50cc hr   8am FS 63, was given another 1/2 amp of D50 push   ( all as per nurse documentation)    Since arrival to [facility] hypoglycemia has been under the care of NP, Michelle Gerardo  I personally took over care at 9 am. I met pt for the first time them s/p dx cardiac cath, patient completely asymptomatic ( denies symptoms of hypoglycemia, however states that he " can't never feel when his sugars are low",  FS at that time was 64 for which he received PO Glutose   endocrinology consult called twice without response, spoke to Dr Cook at 10:18 am , explained above situation and suggested fluids to be switched to D10 at 100cc/hr, cont to check FS, treat hypoglycemia as per protocol, official endocrinology consult pending.    Spoke to patient and wife, they both understand and agree for Mr Medina to stay in hospital at this time until hypoglycemia is resolved. They both verb understanding of how dangerous and life threatening hypoglycemia is and agree to wait until FS stabilize and they see endocrinologist . DR Lewis made aware of above situation as well    Addendum to above note: patient and wife signed AMA documentation and wanted to leave despite multiple attempts of explaining why leaving hosp would be dangerous and life threatening.  I personally spoke to pt's endocrinologist Dr Aguiar, whom also agrees patient should be admitted.  After again speaking to patient and wife multiple time and having wife speak to Dr Aguiar they finally agreed to stay instead.   Hand  off report given to [facility] NP Sloane Rogel  patient cont to be asymptomatic, on D10 at 100 cc/hr last

## 2018-10-04 NOTE — H&P CARDIOLOGY - PMH
Abdominal Obesity    Arthritis    Bipolar disorder    CAD (coronary artery disease)    Congestive heart failure    Diabetes Mellitus Type 2 in Obese    JONAH treated with BiPAP    Smoker    Stented coronary artery Abdominal Obesity    Arthritis    Bipolar disorder    CAD (coronary artery disease)    Congestive heart failure    Diabetes Mellitus Type 2 in Obese    Obesity (BMI 30-39.9)    JONAH treated with BiPAP    Smoker    Stented coronary artery

## 2018-10-04 NOTE — CONSULT NOTE ADULT - PROBLEM SELECTOR RECOMMENDATION 9
Diabetes Education and Nutrition Eval  Hypoglycemia likely related to too much insulin in the setting of weight loss and diet change over the past 24 hours.   Continue with D10 IVF for now until glucose consistently >180 then decrease to D5 IVF as long as patient is eating. Once glucose consistently > 180 on D5 can discontinue dextrose.  Discussed dietary modifications at length with the patient when he goes home as his higher insulin requirements at home likely reflects his diet and also some nonadherence to insulin and medications.   Hold insulin for now.   Goal glucose 100-180  Outpt. endo follow-up  Outpt. optho, podiatry, nephrology  Plan to d/c on metformin 1000mg BID and GLP-1 (whichever is covered by his insurance). Discontinue Soliqua and would avoid insulin at discharge given hypoglycemia. Needs education.

## 2018-10-04 NOTE — PROGRESS NOTE ADULT - ASSESSMENT
58M with h/o CAD with stent x1( Main artery 90% 8/2016 ), OA, HTN, poorly controlled DMT2, JONAH (non compliant with CPAP ), bipolar disorder, current active smoker who was admitted today for  cardiac cath on account of CP and ISAACS. Pt now noted to be hypoglycemic s/p procedure.

## 2018-10-04 NOTE — PROGRESS NOTE ADULT - PROBLEM SELECTOR PLAN 3
- On Soliqua and Invokana at home  - will hold both in light of hypoglycemia  - monitor FS qac qhs  - Endocrinology consult as above

## 2018-10-04 NOTE — PROGRESS NOTE ADULT - SUBJECTIVE AND OBJECTIVE BOX
ACCEPT NOTE  58M with h/o CAD with stent x1( Main artery 90% 2016 ), OA, HTN, poorly controlled DMT2, JONAH (non compliant with CPAP ), bipolar disorder, current active smoker who was admitted today for  cardiac cath on account of CP and ISAACS. Pt reported c/o chest pain with SOB on exertion with left arm numbness and was evaluated by his Cardiologist (Dr. BRYCE Olvera) who referred him for cath. Pt is s/p cardiac cath which was unremarkable. Of note however pt was noted to be hypoglycemic post procedure with finger stick  55. He received a half amp of D50 with minimal improvement of FS. Pt denies s/s of hypoglycemia including dizziness, headache, diaphoresis. etc.   He was started on D10 gtt and Endocrinology has been consulted.    Subjective : Pt denies CP, SOB. Also denies dizziness, headache, diaphoresis.       PAST MEDICAL & SURGICAL HISTORY:  Bipolar disorder  Stented coronary artery  Smoker  Arthritis  CAD (coronary artery disease)  JONAH treated with BiPAP  Congestive heart failure  Abdominal Obesity  Diabetes Mellitus Type 2 in Obese  Umbilical Hernia without Obstruction or Gangrene: s/p repair      Review of Systems:   CONSTITUTIONAL: No fever, weight loss, or fatigue  EYES: No eye pain, visual disturbances, or discharge  ENMT:  No difficulty hearing, tinnitus, vertigo; No sinus or throat pain  RESPIRATORY: No cough, wheezing, chills or hemoptysis; No shortness of breath  CARDIOVASCULAR: No chest pain, palpitations, dizziness, or leg swelling  GASTROINTESTINAL: No abdominal or epigastric pain. No nausea, vomiting, or hematemesis;  GENITOURINARY: No dysuria, frequency, hematuria, or incontinence  NEUROLOGICAL: No headaches, memory loss, loss of strength, numbness, or tremors  SKIN:  +groin rash  ENDOCRINE: No heat or cold intolerance; No hair loss  MUSCULOSKELETAL: No joint pain or swelling; No muscle, back, or extremity pain  PSYCHIATRIC: No depression, anxiety, mood swings, or difficulty sleeping  HEME/LYMPH: No easy bruising, or bleeding gums      Allergies    fish (Nausea; Urticaria)  Fish Products (Nausea; Urticaria)  No Known Drug Allergies  shellfish (Nausea; Urticaria)    Intolerances        Social History: current smoker active (45p/year)    FAMILY HISTORY:  Family history of MI (myocardial infarction): mother  @ 55      MEDICATIONS  (STANDING):  aspirin enteric coated 81 milliGRAM(s) Oral daily  atorvastatin 40 milliGRAM(s) Oral at bedtime  dextrose 10%. 1000 milliLiter(s) (100 mL/Hr) IV Continuous <Continuous>  dextrose 5%. 1000 milliLiter(s) (50 mL/Hr) IV Continuous <Continuous>  dextrose 50% Injectable 12.5 Gram(s) IV Push once  dextrose 50% Injectable 25 Gram(s) IV Push once  dextrose 50% Injectable 25 Gram(s) IV Push once  diVALproex  milliGRAM(s) Oral two times a day  escitalopram 10 milliGRAM(s) Oral daily  influenza   Vaccine 0.5 milliLiter(s) IntraMuscular once  sodium chloride 0.9% lock flush 3 milliLiter(s) IV Push every 8 hours    MEDICATIONS  (PRN):  dextrose 40% Gel 15 Gram(s) Oral once PRN Blood Glucose LESS THAN 70 milliGRAM(s)/deciliter  dextrose 40% Gel 15 Gram(s) Oral once PRN Blood Glucose LESS THAN 70 milliGRAM(s)/deciliter  glucagon  Injectable 1 milliGRAM(s) IntraMuscular once PRN Glucose LESS THAN 70 milligrams/deciliter      Vital Signs Last 24 Hrs  T(C): 37.1 (04 Oct 2018 11:50), Max: 37.1 (04 Oct 2018 11:50)  T(F): 98.7 (04 Oct 2018 11:50), Max: 98.7 (04 Oct 2018 11:50)  HR: 59 (04 Oct 2018 11:50) (59 - 70)  BP: 129/65 (04 Oct 2018 11:50) (129/65 - 134/66)  BP(mean): 88 (04 Oct 2018 06:35) (88 - 88)  RR: 18 (04 Oct 2018 11:50) (18 - 18)  SpO2: 94% (04 Oct 2018 11:50) (92% - 94%)  CAPILLARY BLOOD GLUCOSE      POCT Blood Glucose.: 145 mg/dL (04 Oct 2018 13:02)  POCT Blood Glucose.: 89 mg/dL (04 Oct 2018 12:36)  POCT Blood Glucose.: 91 mg/dL (04 Oct 2018 12:15)  POCT Blood Glucose.: 85 mg/dL (04 Oct 2018 12:01)  POCT Blood Glucose.: 83 mg/dL (04 Oct 2018 11:46)  POCT Blood Glucose.: 90 mg/dL (04 Oct 2018 10:58)  POCT Blood Glucose.: 111 mg/dL (04 Oct 2018 10:42)  POCT Blood Glucose.: 63 mg/dL (04 Oct 2018 09:57)  POCT Blood Glucose.: 71 mg/dL (04 Oct 2018 09:37)  POCT Blood Glucose.: 66 mg/dL (04 Oct 2018 09:20)  POCT Blood Glucose.: 68 mg/dL (04 Oct 2018 08:59)  POCT Blood Glucose.: 76 mg/dL (04 Oct 2018 08:45)  POCT Blood Glucose.: 63 mg/dL (04 Oct 2018 08:08)  POCT Blood Glucose.: 65 mg/dL (04 Oct 2018 07:42)  POCT Blood Glucose.: 76 mg/dL (04 Oct 2018 07:22)  POCT Blood Glucose.: 97 mg/dL (04 Oct 2018 07:09)  POCT Blood Glucose.: 55 mg/dL (04 Oct 2018 06:51)  POCT Blood Glucose.: 53 mg/dL (04 Oct 2018 06:49)    I&O's Summary      PHYSICAL EXAM:  GENERAL: NAD, obese, anicteric, afebrile to touch  HEAD:  Atraumatic, Normocephalic  EYES: EOMI, PERRLA, conjunctiva and sclera clear  NECK: Supple, No JVD  CHEST/LUNG: Clear to auscultation bilaterally; No wheeze  HEART: Regular rate and rhythm; No murmurs, rubs, or gallops  ABDOMEN: Soft, Nontender, Nondistended; Bowel sounds present  EXTREMITIES:  clean dry dressing at cath access site, 2+ Peripheral Pulses, No clubbing, cyanosis, or edema  PSYCH: AAOx3  NEUROLOGY: non-focal  SKIN: No rashes or lesions    LABS:                        17.3   12.9  )-----------( 224      ( 04 Oct 2018 07:03 )             53.6     10-    143  |  105  |  12  ----------------------------<  52<L>  3.5   |  29  |  0.99    Ca    9.7      04 Oct 2018 07:03    TPro  7.9  /  Alb  4.1  /  TBili  0.3  /  DBili  x   /  AST  14  /  ALT  15  /  AlkPhos  93  10-04              RADIOLOGY & ADDITIONAL TESTS:    Imaging Personally Reviewed:    Consultant(s) Notes Reviewed:      Care Discussed with Consultants/Other Providers:

## 2018-10-04 NOTE — H&P CARDIOLOGY - HISTORY OF PRESENT ILLNESS
58 obese  male with PMHx of CAD with stent x1( Main artery 90% 8/2016 at [facility]), arthritis, HTN, DM, JONAH (on BIPAP), bipolar disorder, current smoker (45p/year) presents for cardiac cath. Pt reports he feels chest pain with SOB on exertion with left arm numbness, evaluated by Dr. BRYCE Olvera referred for cath. Pt's finger stick was 55(repeated 55), 1/2 amp of D50 was given then 91 after 15 minutes of glucose. Pt states his sugar always goes down in am, does not feeling s/s of hypoglycemia. Pt also reports he has chronic fungal infection in his bilateral groin, requesting radial access for cath. Pt's spouse reveals pt is not compliant to medication or diet.     Endocrine: Dr. JASPAL [id number] [place])  Cardiology: Dr. BRYCE Olvera    < from: Transthoracic Echocardiogram (03.13.17 @ 10:11) >  1. Normal mitral valve.   2. Normal trileaflet aortic valve.  3. Normal aortic root.  4. Mild left atrial enlargement.  5. Normal left ventricular internal dimensions and wall thicknesses.  6. Normal Left Ventricular Systolic Function,  (EF =50-55%)  7. Grade I diastolic dysfunction  8. Normal right atrium.  9. Normal right ventricular size and function.  10. Unable to estimate RVSP.  11. Normal tricuspid valve.  12. Normal pulmonic valve.  13. Normal pericardium with no pericardial effusion.    < end of copied text > 58 obese  male with PMHx of CAD with stent x1( Main artery 90% 8/2016 at [facility]), arthritis, HTN, DMT2 (last A1C 10 8 months ago), JONAH (not using CPAP due to not able to sleep), bipolar disorder, current smoker active (45p/year) presents for cardiac cath. Pt reports he feels chest pain with SOB on exertion with left arm numbness, evaluated by Dr. BRYCE Olvera referred for cath. Pt's finger stick was 55(repeated 55), 1/2 amp of D50 was given then 91 after 15 minutes of glucose. Pt states his sugar always goes down in am, does not feeling s/s of hypoglycemia. Pt also reports he has chronic fungal infection in his bilateral groin, requesting radial access for cath. Pt's spouse reveals pt is not compliant to medication or diet.     Endocrine: Dr. JASPAL [id number] [place])  Cardiology: Dr. BRYCE Olvera    < from: Transthoracic Echocardiogram (03.13.17 @ 10:11) >  1. Normal mitral valve.   2. Normal trileaflet aortic valve.  3. Normal aortic root.  4. Mild left atrial enlargement.  5. Normal left ventricular internal dimensions and wall thicknesses.  6. Normal Left Ventricular Systolic Function,  (EF =50-55%)  7. Grade I diastolic dysfunction  8. Normal right atrium.  9. Normal right ventricular size and function.  10. Unable to estimate RVSP.  11. Normal tricuspid valve.  12. Normal pulmonic valve.  13. Normal pericardium with no pericardial effusion.    < end of copied text > 58 obese  male with PMHx of CAD with stent x1( Main artery 90% 8/2016 at [facility]), arthritis, HTN, DMT2 (last A1C 10 8 months ago), JONAH (not using CPAP due to not able to sleep), bipolar disorder, current smoker active (45p/year) presents for cardiac cath. Pt reports he feels chest pain with SOB on exertion with left arm numbness, evaluated by Dr. BRYCE Olvera referred for cath. Pt's finger stick was 55(repeated 55), 1/2 amp of D50 was given then 91 after 15 minutes of glucose. Pt states his sugar always goes down in am, does not feeling s/s of hypoglycemia. Pt also reports he has chronic fungal infection in his bilateral groin, requesting radial access for cath. Pt's spouse reveals pt is not compliant to medication or diet.     Endocrine: Dr. JASPAL Aguiar ([place])  Cardiology: Dr. BRYCE Olvera    < from: Transthoracic Echocardiogram (03.13.17 @ 10:11) >  1. Normal mitral valve.   2. Normal trileaflet aortic valve.  3. Normal aortic root.  4. Mild left atrial enlargement.  5. Normal left ventricular internal dimensions and wall thicknesses.  6. Normal Left Ventricular Systolic Function,  (EF =50-55%)  7. Grade I diastolic dysfunction  8. Normal right atrium.  9. Normal right ventricular size and function.  10. Unable to estimate RVSP.  11. Normal tricuspid valve.  12. Normal pulmonic valve.  13. Normal pericardium with no pericardial effusion.    < end of copied text > 58 obese  male with PMHx of CAD with stent x1( Main artery 90% 8/2016 at [facility]), arthritis, HTN, DMT2 (last A1C 10, 8 months ago), JONAH (not using CPAP due to not able to sleep), bipolar disorder, current active smoker (45p/year) presents for cardiac cath. Pt reports he feels chest pain with SOB on exertion associated with left arm numbness. Pt was evaluated by Dr. BRYCE Olvera referred for cath. Pt's finger stick was 55(repeated 55) on arrival, 1/2 amp of D50 was given then 91 after 15 minutes of D50. Pt states his sugar always goes down in am, does not feeling s/s of hypoglycemia. He had half dose of his regular insulin (30 units of Soliqua 100/33 out of 60units). Pt also reports he has chronic fungal infection in his bilateral groin, requesting radial access for cath. Pt's spouse reveals pt is not compliant to medication or diet.     Endocrine: Dr. JASPAL Aguiar ([place])  Cardiology: Dr. BRYCE Olvera    < from: Transthoracic Echocardiogram (03.13.17 @ 10:11) >  1. Normal mitral valve.   2. Normal trileaflet aortic valve.  3. Normal aortic root.  4. Mild left atrial enlargement.  5. Normal left ventricular internal dimensions and wall thicknesses.  6. Normal Left Ventricular Systolic Function,  (EF =50-55%)  7. Grade I diastolic dysfunction  8. Normal right atrium.  9. Normal right ventricular size and function.  10. Unable to estimate RVSP.  11. Normal tricuspid valve.  12. Normal pulmonic valve.  13. Normal pericardium with no pericardial effusion.    < end of copied text >

## 2018-10-04 NOTE — PROGRESS NOTE ADULT - PROBLEM SELECTOR PLAN 1
- with c/o CP and ISAACS currently resolved  - now s/p cath ; unremarkable; no intervention indicated  - c/w ASA, statin

## 2018-10-04 NOTE — CONSULT NOTE ADULT - ASSESSMENT
57 y/o M w/ uncontrolled Type 2 DM w/ A1c of 9.2% now with hypoglycemia with macrovascular complications of CAD, HTN, HLD admitted with hypoglycemia s/p cath (high risk patient with high level decision-making).

## 2018-10-04 NOTE — PROGRESS NOTE ADULT - PROBLEM SELECTOR PLAN 2
- improving on D10 gtt  - asymptomatic at this time  - unclear etiology  - Hold insulin  - Endocrinology consult called

## 2018-10-04 NOTE — CONSULT NOTE ADULT - SUBJECTIVE AND OBJECTIVE BOX
HPI:  59 y/o M w/ hx of Type 2 DM x 7 years. Follows with endocrinologist Dr. Aguiar in [place]. COmplicated by neuropathy. No reported retinopathy or nephropathy. No recent optho. A1c 9.2% At home on Soliqua (basal + GLP-1). in addition to metformin 500mg daily. Takes 60 units daily of Soliqua usually in the afternoon. Yesterday took 30 units in anticipation of cath today. Misses doses 2-3x/week as he feels he sometimes does not need it. Checks glucose daily in the morning with values generally around 220. Usually has a snack at bedtime. No recent hypoglycemia. Last episode of hypoglycemia was a year ago. Glucose as low as 19. He does not remember the contexct of the hypoglycemia. Prior to Soliqua was on basal bolus insulin with Lantus and Novolog. He does not remember the doses. Over the past 6 months he has had intentional 50 lb. weight loss. He is nonadherent to carb consistent diet usually eating cupcakes, cakes, candy, regular soda and juice on a regular basis. He has been more adherent over the past 24 hours in anticvipation of the cath today. He denies polyuria, polydipsia, nausea or vomiting. Mother with Type 2 DM.   Hx of CAD with stent x1( Main artery 90% 2016 at [facility]), arthritis, HTN, JONAH (not using CPAP due to not able to sleep), bipolar disorder, current smoker active (45p/year) presents for cardiac cath. Pt reports he feels chest pain with SOB on exertion with left arm numbness, evaluated by Dr. BRYCE Olvera referred for cath. Pt's finger stick was 55(repeated 55), 1/2 amp of D50 was given then 91 after 15 minutes of glucose. Pt states his sugar always goes down in am, does not feeling s/s of hypoglycemia. Pt also reports he has chronic fungal infection in his bilateral groin, requesting radial access for cath. nOW ON D10 IVF and receiving amps of D50.       PAST MEDICAL & SURGICAL HISTORY:  Bipolar disorder  Stented coronary artery  Smoker  Arthritis  CAD (coronary artery disease)  JONAH treated with BiPAP  Congestive heart failure  Abdominal Obesity  Diabetes Mellitus Type 2 in Obese  Umbilical Hernia without Obstruction or Gangrene: s/p repair      FAMILY HISTORY:  Family history of MI (myocardial infarction): mother  @ 55  Mother- Type 2 DM       Social History: +tobacco use currently. Trying to quit. No alcohol use    Outpatient Medications:  · 	Metformin 500mg daily  · 	divalproex sodium 500 mg oral delayed release tablet: Last Dose Taken:  , 1 tab(s) orally 2 times a day  · 	Soliqua 100/33 subcutaneous solution: Last Dose Taken:  , 60 unit(s) subcutaneous once a day  · 	escitalopram 10 mg oral tablet: Last Dose Taken:  , 1 tab(s) orally once a day    MEDICATIONS  (STANDING):  aspirin enteric coated 81 milliGRAM(s) Oral daily  atorvastatin 40 milliGRAM(s) Oral at bedtime  dextrose 10%. 1000 milliLiter(s) (100 mL/Hr) IV Continuous <Continuous>  dextrose 5%. 1000 milliLiter(s) (50 mL/Hr) IV Continuous <Continuous>  dextrose 50% Injectable 12.5 Gram(s) IV Push once  dextrose 50% Injectable 25 Gram(s) IV Push once  dextrose 50% Injectable 25 Gram(s) IV Push once  diVALproex  milliGRAM(s) Oral two times a day  escitalopram 10 milliGRAM(s) Oral daily  influenza   Vaccine 0.5 milliLiter(s) IntraMuscular once  nicotine - 21 mG/24Hr(s) Patch 1 patch Transdermal daily  sodium chloride 0.9% lock flush 3 milliLiter(s) IV Push every 8 hours    MEDICATIONS  (PRN):  dextrose 40% Gel 15 Gram(s) Oral once PRN Blood Glucose LESS THAN 70 milliGRAM(s)/deciliter  dextrose 40% Gel 15 Gram(s) Oral once PRN Blood Glucose LESS THAN 70 milliGRAM(s)/deciliter  glucagon  Injectable 1 milliGRAM(s) IntraMuscular once PRN Glucose LESS THAN 70 milligrams/deciliter      Allergies    fish (Nausea; Urticaria)  Fish Products (Nausea; Urticaria)  No Known Drug Allergies  shellfish (Nausea; Urticaria)    Intolerances      Review of Systems:  Constitutional: No fever, +weight loss  Eyes: No blurry vision  Neuro: No headache, No paresthesias  HEENT: No throat pain  Cardiovascular: No chest pain  Respiratory: No SOB  GI: No nausea or vomiting  : No polyuria  Skin: no rash  Psych: no depression  Endocrine: No polydipsia, No heat or cold intolerance, rest as noted in HPI  Hem/lymph: no swelling    All other review of systems negative      PHYSICAL EXAM:  VITALS: T(C): 37.1 (10-04-18 @ 11:50)  T(F): 98.7 (10-04-18 @ 11:50), Max: 98.7 (10-04-18 @ 11:50)  HR: 59 (10-04-18 @ 11:50) (59 - 70)  BP: 129/65 (10-04-18 @ 11:50) (129/65 - 134/66)  RR:  (18 - 18)  SpO2:  (92% - 94%)  Wt(kg): --  GENERAL: NAD at this time, obese  EYES: No proptosis, EOMI  HEENT:  Atraumatic, Normocephalic,   THYROID: cannot assess due to body habitus  RESPIRATORY: Clear to auscultation bilaterally, full excursion, non-labored  CARDIOVASCULAR: Regular rhythm; No murmurs; +trace peripheral edema  GI: Soft, nontender, non distended, normal bowel sounds +umbilical hernia  SKIN: Dry, intact, No rashes or lesions  MUSCULOSKELETAL: normal strength  NEURO: follows commands,  PSYCH: Alert and oriented x 3, normal affect, normal mood  CUSHING'S SIGNS: no striae      POCT Blood Glucose.: 85 mg/dL (10-04-18 @ 14:03)  POCT Blood Glucose.: 145 mg/dL (10-04-18 @ 13:02)  POCT Blood Glucose.: 89 mg/dL (10-04-18 @ 12:36)  POCT Blood Glucose.: 91 mg/dL (10-04-18 @ 12:15)  POCT Blood Glucose.: 85 mg/dL (10-04-18 @ 12:01)  POCT Blood Glucose.: 83 mg/dL (10-04-18 @ 11:46)  POCT Blood Glucose.: 90 mg/dL (10-04-18 @ 10:58)  POCT Blood Glucose.: 111 mg/dL (10-04-18 @ 10:42)  POCT Blood Glucose.: 63 mg/dL (10-04-18 @ 09:57)  POCT Blood Glucose.: 71 mg/dL (10-04-18 @ 09:37)  POCT Blood Glucose.: 66 mg/dL (10-04-18 @ 09:20)  POCT Blood Glucose.: 68 mg/dL (10-04-18 @ 08:59)  POCT Blood Glucose.: 76 mg/dL (10-04-18 @ 08:45)  POCT Blood Glucose.: 63 mg/dL (10-04-18 @ 08:08)  POCT Blood Glucose.: 65 mg/dL (10-04-18 @ 07:42)  POCT Blood Glucose.: 76 mg/dL (10-04-18 @ 07:22)  POCT Blood Glucose.: 97 mg/dL (10-04-18 @ 07:09)  POCT Blood Glucose.: 55 mg/dL (10-04-18 @ 06:51)  POCT Blood Glucose.: 53 mg/dL (10-04-18 @ 06:49)                              17.3   12.9  )-----------( 224      ( 04 Oct 2018 07:03 )             53.6       10-04    143  |  105  |  12  ----------------------------<  52<L>  3.5   |  29  |  0.99    EGFR if : 96  EGFR if non : 83    Ca    9.7      10-04    TPro  7.9  /  Alb  4.1  /  TBili  0.3  /  DBili  x   /  AST  14  /  ALT  15  /  AlkPhos  93  10-04    Thyroid Function Tests:      Hemoglobin A1C, Whole Blood: 9.2 % <H> [4.0 - 5.6] (10-04-18 @ 10:20)        Radiology:

## 2018-10-05 ENCOUNTER — TRANSCRIPTION ENCOUNTER (OUTPATIENT)
Age: 58
End: 2018-10-05

## 2018-10-05 LAB
ANION GAP SERPL CALC-SCNC: 11 MMOL/L — SIGNIFICANT CHANGE UP (ref 5–17)
ANION GAP SERPL CALC-SCNC: 8 MMOL/L — SIGNIFICANT CHANGE UP (ref 5–17)
BUN SERPL-MCNC: 10 MG/DL — SIGNIFICANT CHANGE UP (ref 7–23)
BUN SERPL-MCNC: 9 MG/DL — SIGNIFICANT CHANGE UP (ref 7–23)
CALCIUM SERPL-MCNC: 8.1 MG/DL — LOW (ref 8.4–10.5)
CALCIUM SERPL-MCNC: 8.6 MG/DL — SIGNIFICANT CHANGE UP (ref 8.4–10.5)
CHLORIDE SERPL-SCNC: 103 MMOL/L — SIGNIFICANT CHANGE UP (ref 96–108)
CHLORIDE SERPL-SCNC: 104 MMOL/L — SIGNIFICANT CHANGE UP (ref 96–108)
CK MB CFR SERPL CALC: 1.7 NG/ML — SIGNIFICANT CHANGE UP (ref 0–6.7)
CK SERPL-CCNC: 48 U/L — SIGNIFICANT CHANGE UP (ref 30–200)
CO2 SERPL-SCNC: 24 MMOL/L — SIGNIFICANT CHANGE UP (ref 22–31)
CO2 SERPL-SCNC: 25 MMOL/L — SIGNIFICANT CHANGE UP (ref 22–31)
CREAT SERPL-MCNC: 0.78 MG/DL — SIGNIFICANT CHANGE UP (ref 0.5–1.3)
CREAT SERPL-MCNC: 0.9 MG/DL — SIGNIFICANT CHANGE UP (ref 0.5–1.3)
GLUCOSE BLDC GLUCOMTR-MCNC: 108 MG/DL — HIGH (ref 70–99)
GLUCOSE BLDC GLUCOMTR-MCNC: 110 MG/DL — HIGH (ref 70–99)
GLUCOSE BLDC GLUCOMTR-MCNC: 117 MG/DL — HIGH (ref 70–99)
GLUCOSE BLDC GLUCOMTR-MCNC: 133 MG/DL — HIGH (ref 70–99)
GLUCOSE BLDC GLUCOMTR-MCNC: 136 MG/DL — HIGH (ref 70–99)
GLUCOSE BLDC GLUCOMTR-MCNC: 141 MG/DL — HIGH (ref 70–99)
GLUCOSE BLDC GLUCOMTR-MCNC: 158 MG/DL — HIGH (ref 70–99)
GLUCOSE BLDC GLUCOMTR-MCNC: 161 MG/DL — HIGH (ref 70–99)
GLUCOSE BLDC GLUCOMTR-MCNC: 173 MG/DL — HIGH (ref 70–99)
GLUCOSE BLDC GLUCOMTR-MCNC: 179 MG/DL — HIGH (ref 70–99)
GLUCOSE BLDC GLUCOMTR-MCNC: 182 MG/DL — HIGH (ref 70–99)
GLUCOSE BLDC GLUCOMTR-MCNC: 186 MG/DL — HIGH (ref 70–99)
GLUCOSE BLDC GLUCOMTR-MCNC: 194 MG/DL — HIGH (ref 70–99)
GLUCOSE BLDC GLUCOMTR-MCNC: 195 MG/DL — HIGH (ref 70–99)
GLUCOSE BLDC GLUCOMTR-MCNC: 210 MG/DL — HIGH (ref 70–99)
GLUCOSE BLDC GLUCOMTR-MCNC: 269 MG/DL — HIGH (ref 70–99)
GLUCOSE BLDC GLUCOMTR-MCNC: 276 MG/DL — HIGH (ref 70–99)
GLUCOSE BLDC GLUCOMTR-MCNC: 98 MG/DL — SIGNIFICANT CHANGE UP (ref 70–99)
GLUCOSE SERPL-MCNC: 111 MG/DL — HIGH (ref 70–99)
GLUCOSE SERPL-MCNC: 152 MG/DL — HIGH (ref 70–99)
HCT VFR BLD CALC: 45.7 % — SIGNIFICANT CHANGE UP (ref 39–50)
HGB BLD-MCNC: 14.8 G/DL — SIGNIFICANT CHANGE UP (ref 13–17)
MAGNESIUM SERPL-MCNC: 2.2 MG/DL — SIGNIFICANT CHANGE UP (ref 1.6–2.6)
MCHC RBC-ENTMCNC: 31.1 PG — SIGNIFICANT CHANGE UP (ref 27–34)
MCHC RBC-ENTMCNC: 32.3 GM/DL — SIGNIFICANT CHANGE UP (ref 32–36)
MCV RBC AUTO: 96.2 FL — SIGNIFICANT CHANGE UP (ref 80–100)
PLATELET # BLD AUTO: 167 K/UL — SIGNIFICANT CHANGE UP (ref 150–400)
POTASSIUM SERPL-MCNC: 3.3 MMOL/L — LOW (ref 3.5–5.3)
POTASSIUM SERPL-MCNC: 3.6 MMOL/L — SIGNIFICANT CHANGE UP (ref 3.5–5.3)
POTASSIUM SERPL-SCNC: 3.3 MMOL/L — LOW (ref 3.5–5.3)
POTASSIUM SERPL-SCNC: 3.6 MMOL/L — SIGNIFICANT CHANGE UP (ref 3.5–5.3)
RBC # BLD: 4.76 M/UL — SIGNIFICANT CHANGE UP (ref 4.2–5.8)
RBC # FLD: 14 % — SIGNIFICANT CHANGE UP (ref 10.3–14.5)
SODIUM SERPL-SCNC: 135 MMOL/L — SIGNIFICANT CHANGE UP (ref 135–145)
SODIUM SERPL-SCNC: 140 MMOL/L — SIGNIFICANT CHANGE UP (ref 135–145)
TROPONIN T, HIGH SENSITIVITY RESULT: 6 NG/L — SIGNIFICANT CHANGE UP (ref 0–51)
WBC # BLD: 10.3 K/UL — SIGNIFICANT CHANGE UP (ref 3.8–10.5)
WBC # FLD AUTO: 10.3 K/UL — SIGNIFICANT CHANGE UP (ref 3.8–10.5)

## 2018-10-05 PROCEDURE — 99232 SBSQ HOSP IP/OBS MODERATE 35: CPT

## 2018-10-05 PROCEDURE — 99233 SBSQ HOSP IP/OBS HIGH 50: CPT

## 2018-10-05 PROCEDURE — 93306 TTE W/DOPPLER COMPLETE: CPT | Mod: 26

## 2018-10-05 PROCEDURE — 93010 ELECTROCARDIOGRAM REPORT: CPT

## 2018-10-05 PROCEDURE — 71045 X-RAY EXAM CHEST 1 VIEW: CPT | Mod: 26

## 2018-10-05 RX ORDER — METOPROLOL TARTRATE 50 MG
12.5 TABLET ORAL
Qty: 0 | Refills: 0 | Status: DISCONTINUED | OUTPATIENT
Start: 2018-10-05 | End: 2018-10-06

## 2018-10-05 RX ORDER — SODIUM CHLORIDE 9 MG/ML
1000 INJECTION, SOLUTION INTRAVENOUS
Qty: 0 | Refills: 0 | Status: DISCONTINUED | OUTPATIENT
Start: 2018-10-05 | End: 2018-10-06

## 2018-10-05 RX ORDER — FUROSEMIDE 40 MG
20 TABLET ORAL ONCE
Qty: 0 | Refills: 0 | Status: COMPLETED | OUTPATIENT
Start: 2018-10-05 | End: 2018-10-05

## 2018-10-05 RX ORDER — METOPROLOL TARTRATE 50 MG
12.5 TABLET ORAL
Qty: 0 | Refills: 0 | Status: DISCONTINUED | OUTPATIENT
Start: 2018-10-05 | End: 2018-10-05

## 2018-10-05 RX ORDER — ENOXAPARIN SODIUM 100 MG/ML
40 INJECTION SUBCUTANEOUS EVERY 24 HOURS
Qty: 0 | Refills: 0 | Status: DISCONTINUED | OUTPATIENT
Start: 2018-10-05 | End: 2018-10-06

## 2018-10-05 RX ORDER — POTASSIUM CHLORIDE 20 MEQ
40 PACKET (EA) ORAL EVERY 4 HOURS
Qty: 0 | Refills: 0 | Status: COMPLETED | OUTPATIENT
Start: 2018-10-05 | End: 2018-10-05

## 2018-10-05 RX ORDER — POTASSIUM BICARBONATE 978 MG/1
25 TABLET, EFFERVESCENT ORAL ONCE
Qty: 0 | Refills: 0 | Status: COMPLETED | OUTPATIENT
Start: 2018-10-05 | End: 2018-10-05

## 2018-10-05 RX ORDER — METOPROLOL TARTRATE 50 MG
25 TABLET ORAL
Qty: 0 | Refills: 0 | Status: DISCONTINUED | OUTPATIENT
Start: 2018-10-05 | End: 2018-10-05

## 2018-10-05 RX ADMIN — ESCITALOPRAM OXALATE 10 MILLIGRAM(S): 10 TABLET, FILM COATED ORAL at 11:37

## 2018-10-05 RX ADMIN — Medication 40 MILLIEQUIVALENT(S): at 05:33

## 2018-10-05 RX ADMIN — DIVALPROEX SODIUM 500 MILLIGRAM(S): 500 TABLET, DELAYED RELEASE ORAL at 17:50

## 2018-10-05 RX ADMIN — Medication 1 PATCH: at 11:37

## 2018-10-05 RX ADMIN — SODIUM CHLORIDE 3 MILLILITER(S): 9 INJECTION INTRAMUSCULAR; INTRAVENOUS; SUBCUTANEOUS at 05:31

## 2018-10-05 RX ADMIN — Medication 20 MILLIGRAM(S): at 10:19

## 2018-10-05 RX ADMIN — ATORVASTATIN CALCIUM 40 MILLIGRAM(S): 80 TABLET, FILM COATED ORAL at 21:22

## 2018-10-05 RX ADMIN — POTASSIUM BICARBONATE 25 MILLIEQUIVALENT(S): 978 TABLET, EFFERVESCENT ORAL at 10:19

## 2018-10-05 RX ADMIN — SODIUM CHLORIDE 3 MILLILITER(S): 9 INJECTION INTRAMUSCULAR; INTRAVENOUS; SUBCUTANEOUS at 21:18

## 2018-10-05 RX ADMIN — Medication 81 MILLIGRAM(S): at 05:33

## 2018-10-05 RX ADMIN — SODIUM CHLORIDE 3 MILLILITER(S): 9 INJECTION INTRAMUSCULAR; INTRAVENOUS; SUBCUTANEOUS at 13:04

## 2018-10-05 RX ADMIN — DIVALPROEX SODIUM 500 MILLIGRAM(S): 500 TABLET, DELAYED RELEASE ORAL at 05:33

## 2018-10-05 RX ADMIN — Medication 12.5 MILLIGRAM(S): at 06:37

## 2018-10-05 RX ADMIN — Medication 12.5 MILLIGRAM(S): at 17:50

## 2018-10-05 RX ADMIN — ENOXAPARIN SODIUM 40 MILLIGRAM(S): 100 INJECTION SUBCUTANEOUS at 11:37

## 2018-10-05 RX ADMIN — Medication 40 MILLIEQUIVALENT(S): at 02:05

## 2018-10-05 NOTE — PROGRESS NOTE ADULT - PROBLEM SELECTOR PLAN 1
Goal glucose 100-180, ideally off dextrose.  Would change D10 to D5. If glucose consistently > 140 on D5 and eating can d/c D5 and monitor off dextrose. Would discharge if no hypoglycemia x 12 hours off dextrose infusion. Plan to d/c only on metformin 1000mg BID which has low risk of hypoglycemia. He is aware that his home diet warrants much higher doses of medications and insulin and he needs to change his diet to avoid these high insulin requirements. May need to escalate treatment as outpatient if he goes back to being nonadherent to carb consistent diet at home. Discussed at length with the patient.

## 2018-10-05 NOTE — PROGRESS NOTE ADULT - PROBLEM SELECTOR PLAN 1
- with c/o CP and ISAACS currently resolved  - Repeating CXR today due to concern for pulmonary edema  - Lasix 20 mg IV given  - Repleting K.   - now s/p cath ; unremarkable; no intervention indicated  - c/w ASA, statin, BB  - Appreciate cards recs.  - ECHO ordered

## 2018-10-05 NOTE — DISCHARGE NOTE ADULT - ADDITIONAL INSTRUCTIONS
Follow up with your cardiologist and primary care provider in 1-2 weeks. Follow up with your endocrinologist in 1 week and your cardiologist and primary care provider in 1-2 weeks.

## 2018-10-05 NOTE — PROGRESS NOTE ADULT - PROBLEM SELECTOR PLAN 2
- improving on D10 gtt  - Continue glucose check Q2H  - Switch to D5 once glucose >180  - asymptomatic at this time  - unclear etiology  - Hold insulin  - Endocrinology recs appreciated

## 2018-10-05 NOTE — PROVIDER CONTACT NOTE (OTHER) - ASSESSMENT
pt A+Ox3 but lethargic. c/o sob and 8/10 cp radiating to right shouldersinus jackie on tele now with PAC. HR 58, spo2 95 on room air, bp 139/86. pt A+Ox3 but lethargic. c/o sob and 8/10 cp radiating to right shoulder. sinus jackie on tele now with PAC. HR 58, spo2 95 on room air, bp 139/86.

## 2018-10-05 NOTE — DISCHARGE NOTE ADULT - HOSPITAL COURSE
HPI:  58 obese  male with PMHx of CAD with stent x1( Main artery 90% 8/2016 at [facility]), arthritis, HTN, DMT2 (last A1C 10, 8 months ago), JONAH (not using CPAP due to not able to sleep), bipolar disorder, current active smoker (45p/year) presents for cardiac cath. Pt reports he feels chest pain with SOB on exertion associated with left arm numbness. Pt was evaluated by Dr. BRYCE Olvera referred for cath. Pt's finger stick was 55(repeated 55) on arrival, 1/2 amp of D50 was given then 91 after 15 minutes of D50. Pt states his sugar always goes down in am, does not feeling s/s of hypoglycemia. He had half dose of his regular insulin (30 units of Soliqua 100/33 out of 60units). Pt also reports he has chronic fungal infection in his bilateral groin, requesting radial access for cath. Pt's spouse reveals pt is not compliant to medication or diet.     Endocrine: Dr. JASPAL Aguiar ([place])  Cardiology: Dr. BRYCE Olvera    < from: Transthoracic Echocardiogram (03.13.17 @ 10:11) >  1. Normal mitral valve.   2. Normal trileaflet aortic valve.  3. Normal aortic root.  4. Mild left atrial enlargement.  5. Normal left ventricular internal dimensions and wall thicknesses.  6. Normal Left Ventricular Systolic Function,  (EF =50-55%)  7. Grade I diastolic dysfunction  8. Normal right atrium.  9. Normal right ventricular size and function.  10. Unable to estimate RVSP.  11. Normal tricuspid valve.  12. Normal pulmonic valve.  13. Normal pericardium with no pericardial effusion.    < end of copied text > (04 Oct 2018 06:35)    10/4 diagnostic cardiac cath, no intervention. Right radial site without swelling, bleeding.

## 2018-10-05 NOTE — PROGRESS NOTE ADULT - PROBLEM SELECTOR PLAN 3
- On Soliqua and Invokana at home  - will hold both in light of hypoglycemia  - monitor FS qac qhs  - Endocrinology recs appreciated- plan to sent home with Metformin 1000 BID and GLP1

## 2018-10-05 NOTE — CHART NOTE - NSCHARTNOTEFT_GEN_A_CORE
58y old  Male who presents with chest pain (04 Oct 2018 15:04) now s/p diagnostic cath luminal irreg LM, RCA and LCX via right radial artery access.  Now noted to have VT @ 240bpm on tele lasting approximately 30 secs.  On evaluation patient c/o chest pain radiating to right upper arm associated with SOB. Pt denies N/V or dizziness. /80mmHg, HR 86bpm, SaO2 98% O2 2L applied. cardiac enzymes  and serum magnesium sent, EKG: NSB at 56bpm. Cardiology fellow  58y old  Male who presents with chest pain (04 Oct 2018 15:04) now s/p diagnostic cath luminal irreg LM, RCA and LCX via right radial artery access.  Now noted to have VT @ 240bpm on tele lasting approximately 30 secs.  On evaluation patient c/o chest pain radiating to right upper arm associated with SOB. Pt denies N/V or dizziness. /80mmHg, HR 86bpm, SaO2 98% O2 2L applied. cardiac enzymes  and serum magnesium sent, EKG: NSB at 56bpm. Cardiology fellow Dr. Vu notified, now at bedside. Report endorsed to day NP

## 2018-10-05 NOTE — DISCHARGE NOTE ADULT - MEDICATION SUMMARY - MEDICATIONS TO TAKE
I will START or STAY ON the medications listed below when I get home from the hospital:    aspirin 81 mg oral delayed release tablet  -- 1 tab(s) by mouth once a day  -- Indication: For Stented coronary artery    divalproex sodium 500 mg oral delayed release tablet  -- 1 tab(s) by mouth 2 times a day  -- Indication: For Seizure prevention    escitalopram 10 mg oral tablet  -- 1 tab(s) by mouth once a day  -- Indication: For Depression    atorvastatin 40 mg oral tablet  -- 1 tab(s) by mouth once a day (at bedtime)  -- Indication: For Cholesterol control    nicotine 21 mg/24 hr transdermal film, extended release  -- 1 patch by transdermal patch once a day  -- Indication: For Smoking cessation I will START or STAY ON the medications listed below when I get home from the hospital:    aspirin 81 mg oral delayed release tablet  -- 1 tab(s) by mouth once a day  -- Indication: For Stented coronary artery    divalproex sodium 500 mg oral delayed release tablet  -- 1 tab(s) by mouth 2 times a day  -- Indication: For Seizure prevention    escitalopram 10 mg oral tablet  -- 1 tab(s) by mouth once a day  -- Indication: For Depression    metFORMIN 1000 mg oral tablet  -- 1 tab(s) by mouth 2 times a day (with meals)   -- Check with your doctor before becoming pregnant.  Do not drink alcoholic beverages when taking this medication.  It is very important that you take or use this exactly as directed.  Do not skip doses or discontinue unless directed by your doctor.  Obtain medical advice before taking any non-prescription drugs as some may affect the action of this medication.  Take with food or milk.    -- Indication: For Blood sugar control    atorvastatin 40 mg oral tablet  -- 1 tab(s) by mouth once a day (at bedtime)  -- Indication: For Cholesterol control    nicotine 21 mg/24 hr transdermal film, extended release  -- 1 patch by transdermal patch once a day  -- Indication: For Smoking cessation

## 2018-10-05 NOTE — DISCHARGE NOTE ADULT - PLAN OF CARE
Pt remains chest pain free and understands post cath discharge instructions No heavy lifting or pushing/pulling with procedure arm for 2 weeks. No driving for 2 days. You may shower 24 hours following the procedure but avoid baths/swimming for 1 week. Check your wrist site for bleeding and/or swelling daily following procedure and call your doctor immediately if it occurs or if you experience increased pain at the site. Follow up with your cardiologist in 1-2 weeks. You may call [facility] if you have any questions/concerns regarding your procedure [phone]. Your hemoglobin A1C will be between 7-8 Continue to follow with your primary care MD or your endocrinologist.  Follow a heart healthy diabetic diet. If you check your fingerstick glucose at home, call your MD if it is greater than 250mg/dL on 2 occasions or less than 100mg/dL on 2 occasions. Know signs of low blood sugar, such as: dizziness, shakiness, sweating, confusion, hunger, nervousness-drink 4 ounces apple juice if occurs and call your doctor. Know early signs of high blood sugar, such as: frequent urination, increased thirst, blurry vision, fatigue, headache - call your doctor if this occurs. Follow with other practitioners to care for your diabetes, such as ophthalmologist and podiatrist. Your LDL cholesterol will be less than 70mg/dL Continue with your cholesterol medications. Eat a heart healthy diet that is low in saturated fats and salt, and includes whole grains, fruits, vegetables and lean protein; exercise regularly (consult with your physician or cardiologist first); maintain a heart healthy weight; if you smoke - quit (A resource to help you stop smoking is the [facility] – phone number 218-695-3013.). Continue to follow with your primary physician or cardiologist. You will continue not to smoke. If you are on medication to help you quit smoking, be sure to take it as prescribed. Find healthy ways to deal with stress, such as exercise (check with your healthcare provider first), deep breathing, meditation, or enjoyable healthy hobbies.  Avoid situations that may cause you to smoke a cigarette.  Look for help with quitting; you are not alone. A resource to help you stop smoking is the [facility] – phone number [phone]. Your blood glucose will be controlled, without signs of hypoglycemia. Your hemoglobin A1C will be between 7-8 Follow up with your endocrinologist next week.  You will be on metformin 1000mg twice daily and discontinue other diabetes medications for signs of low blood sugar level in this admission.  Continue to follow with your primary care MD or your endocrinologist.  Follow a heart healthy diabetic diet. If you check your fingerstick glucose at home, call your MD if it is greater than 250mg/dL on 2 occasions or less than 100mg/dL on 2 occasions. Know signs of low blood sugar, such as: dizziness, shakiness, sweating, confusion, hunger, nervousness-drink 4 ounces apple juice if occurs and call your doctor. Know early signs of high blood sugar, such as: frequent urination, increased thirst, blurry vision, fatigue, headache - call your doctor if this occurs. Follow with other practitioners to care for your diabetes, such as ophthalmologist and podiatrist.

## 2018-10-05 NOTE — DISCHARGE NOTE ADULT - FINDINGS/TREATMENT
< from: Cardiac Cath Lab - Adult (10.04.18 @ 08:18) >    LM:   --  LM: Angiography showed minor luminal irregularities with no flow  limiting lesions.  LAD:   --  Mid LAD: There was a 10 % stenosis at the site of a prior stent.  CX:   --  Circumflex: Angiography showed minor luminal irregularities with  no flow limiting lesions.  RCA:   --  RCA: Angiography showed minor luminal irregularities with no  flow limiting lesions.    < end of copied text >

## 2018-10-05 NOTE — PROGRESS NOTE ADULT - SUBJECTIVE AND OBJECTIVE BOX
Pt seen and examined at bedside by Dr. Zaida Lynn -- Pager [phone]    Patient is a 58y old  Male who presents with a chief complaint of Hypoglycemia (04 Oct 2018 15:04)      SUBJECTIVE / OVERNIGHT EVENTS:  Pt feels well; did not have any symptoms from his hypoglycemic event.  No events overnight.  no chest pain or SOB.  No trouble with urination or BMs. No N/V or diarrhea.  Pt denies LE edema.  Wants to be discharged today. Sugars are not high enough, and patient is still on D10.     REVIEW OF SYSTEMS:    CONSTITUTIONAL: No weakness, fevers or chills  EYES/ENT: No visual changes;  No vertigo or throat pain   NECK: No pain or stiffness  RESPIRATORY: No cough, wheezing, hemoptysis; No shortness of breath  CARDIOVASCULAR: No chest pain or palpitations  GASTROINTESTINAL: No abdominal or epigastric pain. No nausea, vomiting, or hematemesis; No diarrhea or constipation. No melena or hematochezia.  GENITOURINARY: No dysuria, frequency or hematuria  NEUROLOGICAL: No numbness or weakness  SKIN: No itching, rashes  EXT: No edema, deformities, or weakness  HEME/ONC: No bleeding, no weight loss, no lymphadenopathy, no night sweats.     MEDICATIONS  (STANDING):  aspirin enteric coated 81 milliGRAM(s) Oral daily  atorvastatin 40 milliGRAM(s) Oral at bedtime  dextrose 10%. 1000 milliLiter(s) (100 mL/Hr) IV Continuous <Continuous>  dextrose 5%. 1000 milliLiter(s) (50 mL/Hr) IV Continuous <Continuous>  dextrose 50% Injectable 12.5 Gram(s) IV Push once  dextrose 50% Injectable 25 Gram(s) IV Push once  dextrose 50% Injectable 25 Gram(s) IV Push once  diVALproex  milliGRAM(s) Oral two times a day  escitalopram 10 milliGRAM(s) Oral daily  influenza   Vaccine 0.5 milliLiter(s) IntraMuscular once  metoprolol tartrate 12.5 milliGRAM(s) Oral two times a day  nicotine - 21 mG/24Hr(s) Patch 1 patch Transdermal daily  sodium chloride 0.9% lock flush 3 milliLiter(s) IV Push every 8 hours    MEDICATIONS  (PRN):  dextrose 40% Gel 15 Gram(s) Oral once PRN Blood Glucose LESS THAN 70 milliGRAM(s)/deciliter  dextrose 40% Gel 15 Gram(s) Oral once PRN Blood Glucose LESS THAN 70 milliGRAM(s)/deciliter  glucagon  Injectable 1 milliGRAM(s) IntraMuscular once PRN Glucose LESS THAN 70 milligrams/deciliter      Vital Signs Last 24 Hrs  T(C): 36.7 (05 Oct 2018 04:39), Max: 37.1 (04 Oct 2018 11:50)  T(F): 98 (05 Oct 2018 04:39), Max: 98.7 (04 Oct 2018 11:50)  HR: 56 (05 Oct 2018 05:54) (55 - 62)  BP: 112/78 (05 Oct 2018 10:20) (112/78 - 164/85)  BP(mean): --  RR: 18 (05 Oct 2018 05:54) (17 - 18)  SpO2: 94% (05 Oct 2018 05:54) (93% - 95%)  CAPILLARY BLOOD GLUCOSE      POCT Blood Glucose.: 108 mg/dL (05 Oct 2018 09:32)  POCT Blood Glucose.: 117 mg/dL (05 Oct 2018 07:31)  POCT Blood Glucose.: 136 mg/dL (05 Oct 2018 06:40)  POCT Blood Glucose.: 182 mg/dL (05 Oct 2018 05:46)  POCT Blood Glucose.: 161 mg/dL (05 Oct 2018 04:49)  POCT Blood Glucose.: 210 mg/dL (05 Oct 2018 03:53)  POCT Blood Glucose.: 195 mg/dL (05 Oct 2018 02:45)  POCT Blood Glucose.: 158 mg/dL (05 Oct 2018 01:44)  POCT Blood Glucose.: 133 mg/dL (05 Oct 2018 00:55)  POCT Blood Glucose.: 110 mg/dL (04 Oct 2018 23:47)  POCT Blood Glucose.: 124 mg/dL (04 Oct 2018 22:58)  POCT Blood Glucose.: 94 mg/dL (04 Oct 2018 22:07)  POCT Blood Glucose.: 101 mg/dL (04 Oct 2018 21:16)  POCT Blood Glucose.: 104 mg/dL (04 Oct 2018 20:03)  POCT Blood Glucose.: 119 mg/dL (04 Oct 2018 18:54)  POCT Blood Glucose.: 100 mg/dL (04 Oct 2018 17:42)  POCT Blood Glucose.: 95 mg/dL (04 Oct 2018 16:39)  POCT Blood Glucose.: 85 mg/dL (04 Oct 2018 14:03)  POCT Blood Glucose.: 145 mg/dL (04 Oct 2018 13:02)  POCT Blood Glucose.: 89 mg/dL (04 Oct 2018 12:36)  POCT Blood Glucose.: 91 mg/dL (04 Oct 2018 12:15)  POCT Blood Glucose.: 85 mg/dL (04 Oct 2018 12:01)  POCT Blood Glucose.: 83 mg/dL (04 Oct 2018 11:46)    I&O's Summary    04 Oct 2018 07:01  -  05 Oct 2018 07:00  --------------------------------------------------------  IN: 2420 mL / OUT: 950 mL / NET: 1470 mL    05 Oct 2018 07:01  -  05 Oct 2018 11:05  --------------------------------------------------------  IN: 0 mL / OUT: 300 mL / NET: -300 mL          PHYSICAL EXAM  GENERAL: NAD, well-developed, Obese  HEAD:  Atraumatic, Normocephalic  EYES: EOMI, PERRLA, conjunctiva and sclera clear  NECK: Supple, No JVD  CHEST/LUNG: Clear to auscultation bilaterally; No wheeze  HEART: Regular rate and rhythm; No murmurs, rubs, or gallops  ABDOMEN: Soft, Nontender, Nondistended; Bowel sounds present  EXTREMITIES:  2+ Peripheral Pulses, No clubbing, cyanosis; No edema  PSYCH: AAOx3  SKIN: No rashes or lesions  NEURO: 5/5 Muscle strength x4.  Sensation intact. CN intact.  No gait disturbance.     LABS:                        14.8   10.3  )-----------( 167      ( 05 Oct 2018 00:15 )             45.7     10-05    135  |  103  |  9   ----------------------------<  152<H>  3.6   |  24  |  0.78    Ca    8.1<L>      05 Oct 2018 06:33  Mg     2.2     10-05    TPro  7.9  /  Alb  4.1  /  TBili  0.3  /  DBili  x   /  AST  14  /  ALT  15  /  AlkPhos  93  10-04      CARDIAC MARKERS ( 05 Oct 2018 06:33 )  x     / x     / 48 U/L / x     / 1.7 ng/mL          RADIOLOGY & ADDITIONAL TESTS:    Imaging Personally Reviewed:    Consultant(s) Notes Reviewed:  Endo, Cards  Care Discussed with Consultants/Other Providers: Jasmin Rogel

## 2018-10-05 NOTE — CHART NOTE - NSCHARTNOTEFT_GEN_A_CORE
58y old  Male who presents with chest pain (04 Oct 2018 15:04) now s/p diagnostic cath luminal irreg LM, RCA and LCX via right radial artery access.           Allergies    fish (Nausea; Urticaria)  Fish Products (Nausea; Urticaria)  No Known Drug Allergies  shellfish (Nausea; Urticaria)    Intolerances        Medications:  aspirin enteric coated 81 milliGRAM(s) Oral daily  atorvastatin 40 milliGRAM(s) Oral at bedtime  dextrose 10%. 1000 milliLiter(s) IV Continuous <Continuous>  dextrose 40% Gel 15 Gram(s) Oral once PRN  dextrose 40% Gel 15 Gram(s) Oral once PRN  dextrose 5%. 1000 milliLiter(s) IV Continuous <Continuous>  dextrose 50% Injectable 12.5 Gram(s) IV Push once  dextrose 50% Injectable 25 Gram(s) IV Push once  dextrose 50% Injectable 25 Gram(s) IV Push once  diVALproex  milliGRAM(s) Oral two times a day  escitalopram 10 milliGRAM(s) Oral daily  glucagon  Injectable 1 milliGRAM(s) IntraMuscular once PRN  influenza   Vaccine 0.5 milliLiter(s) IntraMuscular once  nicotine - 21 mG/24Hr(s) Patch 1 patch Transdermal daily  potassium chloride    Tablet ER 40 milliEquivalent(s) Oral every 4 hours  sodium chloride 0.9% lock flush 3 milliLiter(s) IV Push every 8 hours      Vitals:  T(C): 36.9 (10-04-18 @ 21:14), Max: 37.1 (10-04-18 @ 11:50)  HR: 62 (10-04-18 @ 21:14) (59 - 70)  BP: 164/85 (10-04-18 @ 21:14) (129/65 - 164/85)  BP(mean): 88 (10-04-18 @ 06:35) (88 - 88)  RR: 17 (10-04-18 @ 21:14) (17 - 18)  SpO2: 93% (10-04-18 @ 21:14) (92% - 94%)  Wt(kg): --  Daily Height in cm: 182.88 (04 Oct 2018 11:50)    Daily Weight in k.9 (04 Oct 2018 06:35)  I&O's Summary    04 Oct 2018 07:01  -  05 Oct 2018 01:38  --------------------------------------------------------  IN: 1880 mL / OUT: 550 mL / NET: 1330 mL          Physical Exam:  Appearance: Normal  Eyes: PERRL, EOMI  HENT: Normal oral muscosa, NC/AT  Cardiovascular: S1S2, RRR, No M/R/G, no JVD, No Lower extremity edema  Procedural Access Site: Right radial artery access. No hematoma, Non-tender to palpation, 2+ pulse, No bruit, No Ecchymosis  Respiratory: Clear to auscultation bilaterally  Psychiatry: AAOx3, Mood & affect appropriate  Skin: No rashes, No ecchymoses, No cyanosis    10-05    140  |  104  |  10  ----------------------------<  111<H>  3.3<L>   |  25  |  0.90    Ca    8.6      05 Oct 2018 00:15    TPro  7.9  /  Alb  4.1  /  TBili  0.3  /  DBili  x   /  AST  14  /  ALT  15  /  AlkPhos  93  10-04        Lipid panel   Hgb A1c Hemoglobin A1C, Whole Blood: 9.2 % (10-04 @ 10:20)          Imaging:    Interpretation of Telemetry:    ASSESSMENT/PLAN  58y old  Male who presents with chest pain (04 Oct 2018 15:04) now s/p diagnostic cath luminal irreg LM, RCA and LCX via right radial artery access. Pt tolerated the procedure well, site benign. Hospital course c/b hypoglycemia now on D10 @ 100 cc/hr . Goal FS > 180, Discharge pending

## 2018-10-05 NOTE — DISCHARGE NOTE ADULT - CARE PROVIDER_API CALL
Shaik SUSAN Olvera), Cardiology; Nuclear Medicine  [address] [place] [address]  Phone: [phone]  Fax: [phone] Shaik SUSAN Olvera), Cardiology; Nuclear Medicine  [address] [place] [address]  Phone: [phone]  Fax: [phone]    Ana Aguiar), EndocrinologyMetabDiabetes  [address] [place] [address]  Phone: [phone]  Fax: [phone]

## 2018-10-05 NOTE — DISCHARGE NOTE ADULT - CARE PLAN
Principal Discharge DX:	S/P cardiac catheterization  Goal:	Pt remains chest pain free and understands post cath discharge instructions  Assessment and plan of treatment:	No heavy lifting or pushing/pulling with procedure arm for 2 weeks. No driving for 2 days. You may shower 24 hours following the procedure but avoid baths/swimming for 1 week. Check your wrist site for bleeding and/or swelling daily following procedure and call your doctor immediately if it occurs or if you experience increased pain at the site. Follow up with your cardiologist in 1-2 weeks. You may call [facility] if you have any questions/concerns regarding your procedure [phone].  Secondary Diagnosis:	Diabetes Mellitus Type 2 in Obese  Goal:	Your hemoglobin A1C will be between 7-8  Assessment and plan of treatment:	Continue to follow with your primary care MD or your endocrinologist.  Follow a heart healthy diabetic diet. If you check your fingerstick glucose at home, call your MD if it is greater than 250mg/dL on 2 occasions or less than 100mg/dL on 2 occasions. Know signs of low blood sugar, such as: dizziness, shakiness, sweating, confusion, hunger, nervousness-drink 4 ounces apple juice if occurs and call your doctor. Know early signs of high blood sugar, such as: frequent urination, increased thirst, blurry vision, fatigue, headache - call your doctor if this occurs. Follow with other practitioners to care for your diabetes, such as ophthalmologist and podiatrist.  Secondary Diagnosis:	Hyperlipidemia, unspecified hyperlipidemia type  Goal:	Your LDL cholesterol will be less than 70mg/dL  Assessment and plan of treatment:	Continue with your cholesterol medications. Eat a heart healthy diet that is low in saturated fats and salt, and includes whole grains, fruits, vegetables and lean protein; exercise regularly (consult with your physician or cardiologist first); maintain a heart healthy weight; if you smoke - quit (A resource to help you stop smoking is the [facility] – phone number [phone].). Continue to follow with your primary physician or cardiologist.  Secondary Diagnosis:	Smoker  Goal:	You will continue not to smoke.  Assessment and plan of treatment:	If you are on medication to help you quit smoking, be sure to take it as prescribed. Find healthy ways to deal with stress, such as exercise (check with your healthcare provider first), deep breathing, meditation, or enjoyable healthy hobbies.  Avoid situations that may cause you to smoke a cigarette.  Look for help with quitting; you are not alone. A resource to help you stop smoking is the [facility] – phone number [phone]. Principal Discharge DX:	S/P cardiac catheterization  Goal:	Pt remains chest pain free and understands post cath discharge instructions  Assessment and plan of treatment:	No heavy lifting or pushing/pulling with procedure arm for 2 weeks. No driving for 2 days. You may shower 24 hours following the procedure but avoid baths/swimming for 1 week. Check your wrist site for bleeding and/or swelling daily following procedure and call your doctor immediately if it occurs or if you experience increased pain at the site. Follow up with your cardiologist in 1-2 weeks. You may call [facility] if you have any questions/concerns regarding your procedure [phone].  Secondary Diagnosis:	Diabetes Mellitus Type 2 in Obese  Goal:	Your blood glucose will be controlled, without signs of hypoglycemia. Your hemoglobin A1C will be between 7-8  Assessment and plan of treatment:	Follow up with your endocrinologist next week.  You will be on metformin 1000mg twice daily and discontinue other diabetes medications for signs of low blood sugar level in this admission.  Continue to follow with your primary care MD or your endocrinologist.  Follow a heart healthy diabetic diet. If you check your fingerstick glucose at home, call your MD if it is greater than 250mg/dL on 2 occasions or less than 100mg/dL on 2 occasions. Know signs of low blood sugar, such as: dizziness, shakiness, sweating, confusion, hunger, nervousness-drink 4 ounces apple juice if occurs and call your doctor. Know early signs of high blood sugar, such as: frequent urination, increased thirst, blurry vision, fatigue, headache - call your doctor if this occurs. Follow with other practitioners to care for your diabetes, such as ophthalmologist and podiatrist.  Secondary Diagnosis:	Hyperlipidemia, unspecified hyperlipidemia type  Goal:	Your LDL cholesterol will be less than 70mg/dL  Assessment and plan of treatment:	Continue with your cholesterol medications. Eat a heart healthy diet that is low in saturated fats and salt, and includes whole grains, fruits, vegetables and lean protein; exercise regularly (consult with your physician or cardiologist first); maintain a heart healthy weight; if you smoke - quit (A resource to help you stop smoking is the [facility] – phone number [phone].). Continue to follow with your primary physician or cardiologist.  Secondary Diagnosis:	Smoker  Goal:	You will continue not to smoke.  Assessment and plan of treatment:	If you are on medication to help you quit smoking, be sure to take it as prescribed. Find healthy ways to deal with stress, such as exercise (check with your healthcare provider first), deep breathing, meditation, or enjoyable healthy hobbies.  Avoid situations that may cause you to smoke a cigarette.  Look for help with quitting; you are not alone. A resource to help you stop smoking is the [facility] – phone number [phone].

## 2018-10-05 NOTE — PROGRESS NOTE ADULT - SUBJECTIVE AND OBJECTIVE BOX
Chief Complaint: Evaluating this 59 y/o M for uncontrolled Type 2 DM w/ hypoglycemia      Interval History: Feeling better today. + po intake. No hypoglycemia but remains on D10.    MEDICATIONS  (STANDING):  aspirin enteric coated 81 milliGRAM(s) Oral daily  atorvastatin 40 milliGRAM(s) Oral at bedtime  dextrose 5%. 1000 milliLiter(s) (100 mL/Hr) IV Continuous <Continuous>  dextrose 50% Injectable 12.5 Gram(s) IV Push once  dextrose 50% Injectable 25 Gram(s) IV Push once  dextrose 50% Injectable 25 Gram(s) IV Push once  diVALproex  milliGRAM(s) Oral two times a day  enoxaparin Injectable 40 milliGRAM(s) SubCutaneous every 24 hours  escitalopram 10 milliGRAM(s) Oral daily  influenza   Vaccine 0.5 milliLiter(s) IntraMuscular once  metoprolol tartrate 12.5 milliGRAM(s) Oral two times a day  nicotine - 21 mG/24Hr(s) Patch 1 patch Transdermal daily  sodium chloride 0.9% lock flush 3 milliLiter(s) IV Push every 8 hours    MEDICATIONS  (PRN):  dextrose 40% Gel 15 Gram(s) Oral once PRN Blood Glucose LESS THAN 70 milliGRAM(s)/deciliter  dextrose 40% Gel 15 Gram(s) Oral once PRN Blood Glucose LESS THAN 70 milliGRAM(s)/deciliter  glucagon  Injectable 1 milliGRAM(s) IntraMuscular once PRN Glucose LESS THAN 70 milligrams/deciliter      Allergies    fish (Nausea; Urticaria)  Fish Products (Nausea; Urticaria)  No Known Drug Allergies  shellfish (Nausea; Urticaria)    Intolerances      Review of Systems:  Constitutional: No fever  Eyes: No blurry vision  Cardiovascular: No chest pain  Respiratory: No SOB  GI: No abdominal pain, No nausea, No vomiting  Endocrine: as noted in HPI    All other negative      PHYSICAL EXAM:  VITALS: T(C): 36.4 (10-05-18 @ 11:48)  T(F): 97.6 (10-05-18 @ 11:48), Max: 98.4 (10-04-18 @ 21:14)  HR: 58 (10-05-18 @ 11:48) (55 - 62)  BP: 137/81 (10-05-18 @ 11:48) (112/78 - 164/85)  RR:  (17 - 18)  SpO2:  (93% - 95%)  Wt(kg): --  GENERAL: NAD at this time  EYES: EOMI, No proptosis  HEENT:  Atraumatic, Normocephalic,   RESPIRATORY: Clear to auscultation bilaterally, full excursion, non labored  CARDIOVASCULAR: Regular rhythm; normal S1/S2,   GI: Soft, nontender, non distended, normal bowel sounds  SKIN: Warm and dry  PSYCH: normal affect, normal mood      POCT Blood Glucose.: 186 mg/dL (10-05-18 @ 16:24)  POCT Blood Glucose.: 141 mg/dL (10-05-18 @ 13:47)  POCT Blood Glucose.: 98 mg/dL (10-05-18 @ 11:34)  POCT Blood Glucose.: 108 mg/dL (10-05-18 @ 09:32)  POCT Blood Glucose.: 117 mg/dL (10-05-18 @ 07:31)  POCT Blood Glucose.: 136 mg/dL (10-05-18 @ 06:40)  POCT Blood Glucose.: 182 mg/dL (10-05-18 @ 05:46)  POCT Blood Glucose.: 161 mg/dL (10-05-18 @ 04:49)  POCT Blood Glucose.: 210 mg/dL (10-05-18 @ 03:53)  POCT Blood Glucose.: 195 mg/dL (10-05-18 @ 02:45)  POCT Blood Glucose.: 158 mg/dL (10-05-18 @ 01:44)  POCT Blood Glucose.: 133 mg/dL (10-05-18 @ 00:55)  POCT Blood Glucose.: 110 mg/dL (10-04-18 @ 23:47)  POCT Blood Glucose.: 124 mg/dL (10-04-18 @ 22:58)  POCT Blood Glucose.: 94 mg/dL (10-04-18 @ 22:07)  POCT Blood Glucose.: 101 mg/dL (10-04-18 @ 21:16)  POCT Blood Glucose.: 104 mg/dL (10-04-18 @ 20:03)  POCT Blood Glucose.: 119 mg/dL (10-04-18 @ 18:54)  POCT Blood Glucose.: 100 mg/dL (10-04-18 @ 17:42)  POCT Blood Glucose.: 95 mg/dL (10-04-18 @ 16:39)  POCT Blood Glucose.: 85 mg/dL (10-04-18 @ 14:03)  POCT Blood Glucose.: 145 mg/dL (10-04-18 @ 13:02)  POCT Blood Glucose.: 89 mg/dL (10-04-18 @ 12:36)  POCT Blood Glucose.: 91 mg/dL (10-04-18 @ 12:15)  POCT Blood Glucose.: 85 mg/dL (10-04-18 @ 12:01)  POCT Blood Glucose.: 83 mg/dL (10-04-18 @ 11:46)  POCT Blood Glucose.: 90 mg/dL (10-04-18 @ 10:58)  POCT Blood Glucose.: 111 mg/dL (10-04-18 @ 10:42)  POCT Blood Glucose.: 63 mg/dL (10-04-18 @ 09:57)  POCT Blood Glucose.: 71 mg/dL (10-04-18 @ 09:37)  POCT Blood Glucose.: 66 mg/dL (10-04-18 @ 09:20)  POCT Blood Glucose.: 68 mg/dL (10-04-18 @ 08:59)  POCT Blood Glucose.: 76 mg/dL (10-04-18 @ 08:45)  POCT Blood Glucose.: 63 mg/dL (10-04-18 @ 08:08)  POCT Blood Glucose.: 65 mg/dL (10-04-18 @ 07:42)  POCT Blood Glucose.: 76 mg/dL (10-04-18 @ 07:22)  POCT Blood Glucose.: 97 mg/dL (10-04-18 @ 07:09)  POCT Blood Glucose.: 55 mg/dL (10-04-18 @ 06:51)  POCT Blood Glucose.: 53 mg/dL (10-04-18 @ 06:49)        10-05    135  |  103  |  9   ----------------------------<  152<H>  3.6   |  24  |  0.78    EGFR if : 115  EGFR if non : 99    Ca    8.1<L>      10-05  Mg     2.2     10-05    TPro  7.9  /  Alb  4.1  /  TBili  0.3  /  DBili  x   /  AST  14  /  ALT  15  /  AlkPhos  93  10-04        Thyroid Function Tests:      Hemoglobin A1C, Whole Blood: 9.2 % <H> [4.0 - 5.6] (10-04-18 @ 10:20)

## 2018-10-05 NOTE — DISCHARGE NOTE ADULT - MEDICATION SUMMARY - MEDICATIONS TO STOP TAKING
I will STOP taking the medications listed below when I get home from the hospital:    Invokana 300 mg oral tablet  -- 1 tab(s) by mouth once a day    Soliqua 100/33 subcutaneous solution  -- 60 unit(s) subcutaneous once a day

## 2018-10-05 NOTE — PROVIDER CONTACT NOTE (OTHER) - ACTION/TREATMENT ORDERED:
NP at bedside, ekg done, cardiac enzymes and magnesium drawn, nasal cannula o2 2L given. will continue to monitor.

## 2018-10-06 VITALS
RESPIRATION RATE: 18 BRPM | HEART RATE: 61 BPM | TEMPERATURE: 98 F | SYSTOLIC BLOOD PRESSURE: 127 MMHG | DIASTOLIC BLOOD PRESSURE: 66 MMHG | OXYGEN SATURATION: 98 %

## 2018-10-06 LAB
ANION GAP SERPL CALC-SCNC: 8 MMOL/L — SIGNIFICANT CHANGE UP (ref 5–17)
BUN SERPL-MCNC: 15 MG/DL — SIGNIFICANT CHANGE UP (ref 7–23)
CALCIUM SERPL-MCNC: 8.5 MG/DL — SIGNIFICANT CHANGE UP (ref 8.4–10.5)
CHLORIDE SERPL-SCNC: 104 MMOL/L — SIGNIFICANT CHANGE UP (ref 96–108)
CO2 SERPL-SCNC: 26 MMOL/L — SIGNIFICANT CHANGE UP (ref 22–31)
CREAT SERPL-MCNC: 0.96 MG/DL — SIGNIFICANT CHANGE UP (ref 0.5–1.3)
GLUCOSE BLDC GLUCOMTR-MCNC: 162 MG/DL — HIGH (ref 70–99)
GLUCOSE BLDC GLUCOMTR-MCNC: 185 MG/DL — HIGH (ref 70–99)
GLUCOSE BLDC GLUCOMTR-MCNC: 204 MG/DL — HIGH (ref 70–99)
GLUCOSE BLDC GLUCOMTR-MCNC: 214 MG/DL — HIGH (ref 70–99)
GLUCOSE BLDC GLUCOMTR-MCNC: 257 MG/DL — HIGH (ref 70–99)
GLUCOSE SERPL-MCNC: 182 MG/DL — HIGH (ref 70–99)
HCT VFR BLD CALC: 47.6 % — SIGNIFICANT CHANGE UP (ref 39–50)
HGB BLD-MCNC: 15.6 G/DL — SIGNIFICANT CHANGE UP (ref 13–17)
MCHC RBC-ENTMCNC: 31.3 PG — SIGNIFICANT CHANGE UP (ref 27–34)
MCHC RBC-ENTMCNC: 32.7 GM/DL — SIGNIFICANT CHANGE UP (ref 32–36)
MCV RBC AUTO: 95.6 FL — SIGNIFICANT CHANGE UP (ref 80–100)
PLATELET # BLD AUTO: 157 K/UL — SIGNIFICANT CHANGE UP (ref 150–400)
POTASSIUM SERPL-MCNC: 3.9 MMOL/L — SIGNIFICANT CHANGE UP (ref 3.5–5.3)
POTASSIUM SERPL-SCNC: 3.9 MMOL/L — SIGNIFICANT CHANGE UP (ref 3.5–5.3)
RBC # BLD: 4.98 M/UL — SIGNIFICANT CHANGE UP (ref 4.2–5.8)
RBC # FLD: 14 % — SIGNIFICANT CHANGE UP (ref 10.3–14.5)
SODIUM SERPL-SCNC: 138 MMOL/L — SIGNIFICANT CHANGE UP (ref 135–145)
WBC # BLD: 10 K/UL — SIGNIFICANT CHANGE UP (ref 3.8–10.5)
WBC # FLD AUTO: 10 K/UL — SIGNIFICANT CHANGE UP (ref 3.8–10.5)

## 2018-10-06 PROCEDURE — 99232 SBSQ HOSP IP/OBS MODERATE 35: CPT

## 2018-10-06 PROCEDURE — 99239 HOSP IP/OBS DSCHRG MGMT >30: CPT

## 2018-10-06 RX ORDER — NICOTINE POLACRILEX 2 MG
1 GUM BUCCAL
Qty: 30 | Refills: 0 | OUTPATIENT
Start: 2018-10-06 | End: 2018-11-04

## 2018-10-06 RX ORDER — ASPIRIN/CALCIUM CARB/MAGNESIUM 324 MG
1 TABLET ORAL
Qty: 0 | Refills: 0 | COMMUNITY
Start: 2018-10-06

## 2018-10-06 RX ORDER — CANAGLIFLOZIN 100 MG/1
1 TABLET, FILM COATED ORAL
Qty: 0 | Refills: 0 | COMMUNITY

## 2018-10-06 RX ORDER — ATORVASTATIN CALCIUM 80 MG/1
1 TABLET, FILM COATED ORAL
Qty: 0 | Refills: 0 | COMMUNITY
Start: 2018-10-06

## 2018-10-06 RX ORDER — INSULIN GLARGINE AND LIXISENATIDE 100; 33 U/ML; UG/ML
60 INJECTION, SOLUTION SUBCUTANEOUS
Qty: 0 | Refills: 0 | COMMUNITY

## 2018-10-06 RX ORDER — METFORMIN HYDROCHLORIDE 850 MG/1
1 TABLET ORAL
Qty: 60 | Refills: 1 | OUTPATIENT
Start: 2018-10-06 | End: 2018-12-04

## 2018-10-06 RX ORDER — NICOTINE POLACRILEX 2 MG
1 GUM BUCCAL
Qty: 0 | Refills: 0 | COMMUNITY
Start: 2018-10-06

## 2018-10-06 RX ADMIN — Medication 1 PATCH: at 11:31

## 2018-10-06 RX ADMIN — ESCITALOPRAM OXALATE 10 MILLIGRAM(S): 10 TABLET, FILM COATED ORAL at 11:31

## 2018-10-06 RX ADMIN — DIVALPROEX SODIUM 500 MILLIGRAM(S): 500 TABLET, DELAYED RELEASE ORAL at 05:33

## 2018-10-06 RX ADMIN — Medication 81 MILLIGRAM(S): at 05:33

## 2018-10-06 RX ADMIN — SODIUM CHLORIDE 3 MILLILITER(S): 9 INJECTION INTRAMUSCULAR; INTRAVENOUS; SUBCUTANEOUS at 05:33

## 2018-10-06 RX ADMIN — Medication 12.5 MILLIGRAM(S): at 05:33

## 2018-10-06 NOTE — PROGRESS NOTE ADULT - SUBJECTIVE AND OBJECTIVE BOX
Patient is currently without interval symptoms. The dextrose solution was discontinued last night. He is tolerating meals well.    MEDICATIONS  (STANDING):  aspirin enteric coated 81 milliGRAM(s) Oral daily  atorvastatin 40 milliGRAM(s) Oral at bedtime  dextrose 5%. 1000 milliLiter(s) (100 mL/Hr) IV Continuous <Continuous>  dextrose 50% Injectable 12.5 Gram(s) IV Push once  dextrose 50% Injectable 25 Gram(s) IV Push once  dextrose 50% Injectable 25 Gram(s) IV Push once  diVALproex  milliGRAM(s) Oral two times a day  enoxaparin Injectable 40 milliGRAM(s) SubCutaneous every 24 hours  escitalopram 10 milliGRAM(s) Oral daily  influenza   Vaccine 0.5 milliLiter(s) IntraMuscular once  metoprolol tartrate 12.5 milliGRAM(s) Oral two times a day  nicotine - 21 mG/24Hr(s) Patch 1 patch Transdermal daily  sodium chloride 0.9% lock flush 3 milliLiter(s) IV Push every 8 hours    MEDICATIONS  (PRN):  dextrose 40% Gel 15 Gram(s) Oral once PRN Blood Glucose LESS THAN 70 milliGRAM(s)/deciliter  dextrose 40% Gel 15 Gram(s) Oral once PRN Blood Glucose LESS THAN 70 milliGRAM(s)/deciliter  glucagon  Injectable 1 milliGRAM(s) IntraMuscular once PRN Glucose LESS THAN 70 milligrams/deciliter      Allergies  fish (Nausea; Urticaria)  Fish Products (Nausea; Urticaria)  No Known Drug Allergies  shellfish (Nausea; Urticaria)    Review of Systems:  Constitutional: No fever  Eyes: No blurry vision  Neuro: No tremors  HEENT: No pain  Cardiovascular: No chest pain, palpitations  Respiratory: No SOB, no cough  GI: No nausea, vomiting, abdominal pain  ALL OTHER SYSTEMS REVIEWED AND NEGATIVE    PHYSICAL EXAM:  VITALS: T(C): 36.5 (10-06-18 @ 05:26)  T(F): 97.7 (10-06-18 @ 05:26), Max: 97.7 (10-06-18 @ 05:26)  HR: 65 (10-06-18 @ 05:26) (58 - 77)  BP: 147/75 (10-06-18 @ 05:26) (97/53 - 156/95)  RR:  (18 - 18)  SpO2:  (95% - 98%)  Wt(kg): --  GENERAL: obeseNAD, well-groomed, well-developed  EYES: No proptosis, no lid lag, anicteric  HEENT:  Atraumatic, Normocephalic, moist mucous membranes  RESPIRATORY: Respirations are even and unlabored, no use of accessory muscles  GI: Soft, nontender, non distended, normal bowel sounds  MUSCULOSKELETAL: Full range of motion, normal strength  PSYCH: Alert and oriented x 3, reactive affect, normal mood    POCT Blood Glucose.: 162 mg/dL (10-06-18 @ 10:23)  POCT Blood Glucose.: 214 mg/dL (10-06-18 @ 07:47)  POCT Blood Glucose.: 257 mg/dL (10-06-18 @ 05:34)  POCT Blood Glucose.: 185 mg/dL (10-06-18 @ 03:28)  POCT Blood Glucose.: 204 mg/dL (10-06-18 @ 01:38)  POCT Blood Glucose.: 173 mg/dL (10-05-18 @ 23:32)  POCT Blood Glucose.: 276 mg/dL (10-05-18 @ 21:32)  POCT Blood Glucose.: 269 mg/dL (10-05-18 @ 20:33)  POCT Blood Glucose.: 179 mg/dL (10-05-18 @ 18:42)  POCT Blood Glucose.: 194 mg/dL (10-05-18 @ 18:31)  POCT Blood Glucose.: 186 mg/dL (10-05-18 @ 16:24)  POCT Blood Glucose.: 141 mg/dL (10-05-18 @ 13:47)  POCT Blood Glucose.: 98 mg/dL (10-05-18 @ 11:34)  POCT Blood Glucose.: 108 mg/dL (10-05-18 @ 09:32)  POCT Blood Glucose.: 117 mg/dL (10-05-18 @ 07:31)  POCT Blood Glucose.: 136 mg/dL (10-05-18 @ 06:40)  POCT Blood Glucose.: 182 mg/dL (10-05-18 @ 05:46)  POCT Blood Glucose.: 161 mg/dL (10-05-18 @ 04:49)  POCT Blood Glucose.: 210 mg/dL (10-05-18 @ 03:53)  POCT Blood Glucose.: 195 mg/dL (10-05-18 @ 02:45)  POCT Blood Glucose.: 158 mg/dL (10-05-18 @ 01:44)  POCT Blood Glucose.: 133 mg/dL (10-05-18 @ 00:55)  POCT Blood Glucose.: 110 mg/dL (10-04-18 @ 23:47)  POCT Blood Glucose.: 124 mg/dL (10-04-18 @ 22:58)  POCT Blood Glucose.: 94 mg/dL (10-04-18 @ 22:07)  POCT Blood Glucose.: 101 mg/dL (10-04-18 @ 21:16)  POCT Blood Glucose.: 104 mg/dL (10-04-18 @ 20:03)  POCT Blood Glucose.: 119 mg/dL (10-04-18 @ 18:54)  POCT Blood Glucose.: 100 mg/dL (10-04-18 @ 17:42)  POCT Blood Glucose.: 95 mg/dL (10-04-18 @ 16:39)  POCT Blood Glucose.: 85 mg/dL (10-04-18 @ 14:03)  POCT Blood Glucose.: 145 mg/dL (10-04-18 @ 13:02)  POCT Blood Glucose.: 89 mg/dL (10-04-18 @ 12:36)  POCT Blood Glucose.: 91 mg/dL (10-04-18 @ 12:15)  POCT Blood Glucose.: 85 mg/dL (10-04-18 @ 12:01)  POCT Blood Glucose.: 83 mg/dL (10-04-18 @ 11:46)  POCT Blood Glucose.: 90 mg/dL (10-04-18 @ 10:58)  POCT Blood Glucose.: 111 mg/dL (10-04-18 @ 10:42)  POCT Blood Glucose.: 63 mg/dL (10-04-18 @ 09:57)  POCT Blood Glucose.: 71 mg/dL (10-04-18 @ 09:37)  POCT Blood Glucose.: 66 mg/dL (10-04-18 @ 09:20)  POCT Blood Glucose.: 68 mg/dL (10-04-18 @ 08:59)  POCT Blood Glucose.: 76 mg/dL (10-04-18 @ 08:45)  POCT Blood Glucose.: 63 mg/dL (10-04-18 @ 08:08)  POCT Blood Glucose.: 65 mg/dL (10-04-18 @ 07:42)  POCT Blood Glucose.: 76 mg/dL (10-04-18 @ 07:22)  POCT Blood Glucose.: 97 mg/dL (10-04-18 @ 07:09)  POCT Blood Glucose.: 55 mg/dL (10-04-18 @ 06:51)  POCT Blood Glucose.: 53 mg/dL (10-04-18 @ 06:49)    10-06    138  |  104  |  15  ----------------------------<  182<H>  3.9   |  26  |  0.96    EGFR if : 100  EGFR if non : 86    Ca    8.5      10-06  Mg     2.2     10-05    TPro  7.9  /  Alb  4.1  /  TBili  0.3  /  DBili  x   /  AST  14  /  ALT  15  /  AlkPhos  93  10-04    Hemoglobin A1C, Whole Blood: 9.2 % <H> [4.0 - 5.6] (10-04-18 @ 10:20)

## 2018-10-06 NOTE — PROGRESS NOTE ADULT - REASON FOR ADMISSION
Hypoglycemia
Hypoglycemia cardiac cath
chest pain, hypoglycemia
chest pain, sob, hypoglycemia
Chest pain

## 2018-10-06 NOTE — PROGRESS NOTE ADULT - PROBLEM SELECTOR PLAN 1
- s/p cath ; unremarkable; no intervention indicated  - CP and ISAACS currently resolved  - Repeat CXR yesterday showed clear lungs, no pulmonary edema  - c/w ASA, statin, BB  - Appreciate cards recs.  - TTE done 10/5 with EF 49% - s/p cath ; unremarkable; no intervention indicated  - CP and ISAACS currently resolved  - Repeat CXR yesterday showed clear lungs, no pulmonary edema  - c/w ASA, statin, BB  - Appreciate cards recs.  - TTE done 10/5 with EF 49%, normal LV systolic fxn, normal diastolic fxn

## 2018-10-06 NOTE — PROGRESS NOTE ADULT - PROBLEM SELECTOR PLAN 1
-Patient is without hypoglycemia off of dextrose solution. The hypoglycemia secondary to soliqua dose and intentional 50 lbs weight loss.  For now, his sugars are at goal  -discussed that he requires follow up visit with his endocrinologist Dr. Casey in [place] to determine whether he even needs Soliqua.  -for now, discharge on metformin 1000 mg BID, fingerstick checks and dietary/behavioral changes  -his diabetes regimen can be optimized in the outpatient setting with his endocrinologist  -consistent carbohydrate diet -Patient is without hypoglycemia off of dextrose solution. The hypoglycemia secondary to soliqua dose and intentional 50 lbs weight loss.  For now, his sugars are at goal  -discussed that he requires follow up visit with his endocrinologist Dr. Casey in [place] to determine whether he even needs Soliqua.  -for now, discharge on metformin 1000 mg BID, fingerstick checks and dietary/behavioral changes. Stop soliqua and hold on invokana for now.  -his diabetes regimen can be optimized in the outpatient setting with his endocrinologist  -consistent carbohydrate diet

## 2018-10-06 NOTE — PROGRESS NOTE ADULT - PROBLEM SELECTOR PLAN 3
- On Soliqua and Invokana at home  - both held in light of hypoglycemia  - Endocrinology recs appreciated ; will discharge home with Metformin 1000 BID

## 2018-10-06 NOTE — PROGRESS NOTE ADULT - SUBJECTIVE AND OBJECTIVE BOX
Patient is a 58y old  Male who presents with a chief complaint of Hypoglycemia (05 Oct 2018 22:47)      SUBJECTIVE / OVERNIGHT EVENTS:    MEDICATIONS  (STANDING):  aspirin enteric coated 81 milliGRAM(s) Oral daily  atorvastatin 40 milliGRAM(s) Oral at bedtime  dextrose 5%. 1000 milliLiter(s) (100 mL/Hr) IV Continuous <Continuous>  dextrose 50% Injectable 12.5 Gram(s) IV Push once  dextrose 50% Injectable 25 Gram(s) IV Push once  dextrose 50% Injectable 25 Gram(s) IV Push once  diVALproex  milliGRAM(s) Oral two times a day  enoxaparin Injectable 40 milliGRAM(s) SubCutaneous every 24 hours  escitalopram 10 milliGRAM(s) Oral daily  influenza   Vaccine 0.5 milliLiter(s) IntraMuscular once  metoprolol tartrate 12.5 milliGRAM(s) Oral two times a day  nicotine - 21 mG/24Hr(s) Patch 1 patch Transdermal daily  sodium chloride 0.9% lock flush 3 milliLiter(s) IV Push every 8 hours    MEDICATIONS  (PRN):  dextrose 40% Gel 15 Gram(s) Oral once PRN Blood Glucose LESS THAN 70 milliGRAM(s)/deciliter  dextrose 40% Gel 15 Gram(s) Oral once PRN Blood Glucose LESS THAN 70 milliGRAM(s)/deciliter  glucagon  Injectable 1 milliGRAM(s) IntraMuscular once PRN Glucose LESS THAN 70 milligrams/deciliter      Vital Signs Last 24 Hrs  T(C): 36.5 (06 Oct 2018 05:26), Max: 36.5 (06 Oct 2018 05:26)  T(F): 97.7 (06 Oct 2018 05:26), Max: 97.7 (06 Oct 2018 05:26)  HR: 65 (06 Oct 2018 05:26) (58 - 77)  BP: 147/75 (06 Oct 2018 05:26) (97/53 - 156/95)  BP(mean): --  RR: 18 (06 Oct 2018 05:26) (18 - 18)  SpO2: 98% (06 Oct 2018 05:26) (95% - 98%)  CAPILLARY BLOOD GLUCOSE      POCT Blood Glucose.: 214 mg/dL (06 Oct 2018 07:47)  POCT Blood Glucose.: 257 mg/dL (06 Oct 2018 05:34)  POCT Blood Glucose.: 185 mg/dL (06 Oct 2018 03:28)  POCT Blood Glucose.: 204 mg/dL (06 Oct 2018 01:38)  POCT Blood Glucose.: 173 mg/dL (05 Oct 2018 23:32)  POCT Blood Glucose.: 276 mg/dL (05 Oct 2018 21:32)  POCT Blood Glucose.: 269 mg/dL (05 Oct 2018 20:33)  POCT Blood Glucose.: 179 mg/dL (05 Oct 2018 18:42)  POCT Blood Glucose.: 194 mg/dL (05 Oct 2018 18:31)  POCT Blood Glucose.: 186 mg/dL (05 Oct 2018 16:24)  POCT Blood Glucose.: 141 mg/dL (05 Oct 2018 13:47)  POCT Blood Glucose.: 98 mg/dL (05 Oct 2018 11:34)    I&O's Summary    05 Oct 2018 07:01  -  06 Oct 2018 07:00  --------------------------------------------------------  IN: 1680 mL / OUT: 2050 mL / NET: -370 mL    06 Oct 2018 07:01  -  06 Oct 2018 10:00  --------------------------------------------------------  IN: 240 mL / OUT: 0 mL / NET: 240 mL        PHYSICAL EXAM:  GENERAL: NAD, well-developed  HEAD:  Atraumatic, Normocephalic  EYES: EOMI, PERRLA, conjunctiva and sclera clear  NECK: Supple, No JVD  CHEST/LUNG: Clear to auscultation bilaterally; No wheeze  HEART: Regular rate and rhythm; No murmurs, rubs, or gallops  ABDOMEN: Soft, Nontender, Nondistended; Bowel sounds present  EXTREMITIES:  2+ Peripheral Pulses, No clubbing, cyanosis, or edema  PSYCH: AAOx3  NEUROLOGY: non-focal  SKIN: No rashes or lesions    LABS:                        15.6   10.0  )-----------( 157      ( 06 Oct 2018 00:03 )             47.6     10-06    138  |  104  |  15  ----------------------------<  182<H>  3.9   |  26  |  0.96    Ca    8.5      06 Oct 2018 00:03  Mg     2.2     10-05        CARDIAC MARKERS ( 05 Oct 2018 06:33 )  x     / x     / 48 U/L / x     / 1.7 ng/mL    TTE 10/5  EF 49%  1. Mitral annular calcification, otherwise normal mitral  valve. Mild mitral regurgitation.  2. Calcified trileaflet aortic valve with normal opening.  Minimal aortic regurgitation.  3. Moderate concentric left ventricular hypertrophy.  4. Normal left ventricular systolic function. No segmental  wall motion abnormalities.  5. Normal diastolic function  6. The right ventricle is not well visualized; grossly  normal right ventricular systolic function.        RADIOLOGY & ADDITIONAL TESTS:    Imaging Personally Reviewed:    Consultant(s) Notes Reviewed:      Care Discussed with Consultants/Other Providers: Patient is a 58y old  Male who presents with a chief complaint of Hypoglycemia (05 Oct 2018 22:47)      SUBJECTIVE / OVERNIGHT EVENTS: Pt seen and examined. No acute events overnight. He denies any new c/o. Pt denies cp, sob, dizziness.  Seen by Endocrine ; reccs appreciated. D5 was stopped last night and FS have been stable since.    MEDICATIONS  (STANDING):  aspirin enteric coated 81 milliGRAM(s) Oral daily  atorvastatin 40 milliGRAM(s) Oral at bedtime  dextrose 5%. 1000 milliLiter(s) (100 mL/Hr) IV Continuous <Continuous>  dextrose 50% Injectable 12.5 Gram(s) IV Push once  dextrose 50% Injectable 25 Gram(s) IV Push once  dextrose 50% Injectable 25 Gram(s) IV Push once  diVALproex  milliGRAM(s) Oral two times a day  enoxaparin Injectable 40 milliGRAM(s) SubCutaneous every 24 hours  escitalopram 10 milliGRAM(s) Oral daily  influenza   Vaccine 0.5 milliLiter(s) IntraMuscular once  metoprolol tartrate 12.5 milliGRAM(s) Oral two times a day  nicotine - 21 mG/24Hr(s) Patch 1 patch Transdermal daily  sodium chloride 0.9% lock flush 3 milliLiter(s) IV Push every 8 hours    MEDICATIONS  (PRN):  dextrose 40% Gel 15 Gram(s) Oral once PRN Blood Glucose LESS THAN 70 milliGRAM(s)/deciliter  dextrose 40% Gel 15 Gram(s) Oral once PRN Blood Glucose LESS THAN 70 milliGRAM(s)/deciliter  glucagon  Injectable 1 milliGRAM(s) IntraMuscular once PRN Glucose LESS THAN 70 milligrams/deciliter      Vital Signs Last 24 Hrs  T(C): 36.5 (06 Oct 2018 05:26), Max: 36.5 (06 Oct 2018 05:26)  T(F): 97.7 (06 Oct 2018 05:26), Max: 97.7 (06 Oct 2018 05:26)  HR: 65 (06 Oct 2018 05:26) (58 - 77)  BP: 147/75 (06 Oct 2018 05:26) (97/53 - 156/95)  BP(mean): --  RR: 18 (06 Oct 2018 05:26) (18 - 18)  SpO2: 98% (06 Oct 2018 05:26) (95% - 98%)  CAPILLARY BLOOD GLUCOSE      POCT Blood Glucose.: 214 mg/dL (06 Oct 2018 07:47)  POCT Blood Glucose.: 257 mg/dL (06 Oct 2018 05:34)  POCT Blood Glucose.: 185 mg/dL (06 Oct 2018 03:28)  POCT Blood Glucose.: 204 mg/dL (06 Oct 2018 01:38)  POCT Blood Glucose.: 173 mg/dL (05 Oct 2018 23:32)  POCT Blood Glucose.: 276 mg/dL (05 Oct 2018 21:32)  POCT Blood Glucose.: 269 mg/dL (05 Oct 2018 20:33)  POCT Blood Glucose.: 179 mg/dL (05 Oct 2018 18:42)  POCT Blood Glucose.: 194 mg/dL (05 Oct 2018 18:31)  POCT Blood Glucose.: 186 mg/dL (05 Oct 2018 16:24)  POCT Blood Glucose.: 141 mg/dL (05 Oct 2018 13:47)  POCT Blood Glucose.: 98 mg/dL (05 Oct 2018 11:34)    I&O's Summary    05 Oct 2018 07:01  -  06 Oct 2018 07:00  --------------------------------------------------------  IN: 1680 mL / OUT: 2050 mL / NET: -370 mL    06 Oct 2018 07:01  -  06 Oct 2018 10:00  --------------------------------------------------------  IN: 240 mL / OUT: 0 mL / NET: 240 mL        PHYSICAL EXAM:  GENERAL: NAD, anicteric, afebrile  HEAD:  Atraumatic, Normocephalic  EYES: EOMI, PERRLA, conjunctiva and sclera clear  NECK: Supple, No JVD  CHEST/LUNG: Clear to auscultation bilaterally; No wheeze  HEART: Regular rate and rhythm; No murmurs, rubs, or gallops  ABDOMEN: Soft, Nontender, Nondistended; Bowel sounds present  EXTREMITIES:  2+ Peripheral Pulses, No clubbing, cyanosis, or edema  PSYCH: AAOx3  NEUROLOGY: non-focal  SKIN: No rashes or lesions    LABS:                        15.6   10.0  )-----------( 157      ( 06 Oct 2018 00:03 )             47.6     10-06    138  |  104  |  15  ----------------------------<  182<H>  3.9   |  26  |  0.96    Ca    8.5      06 Oct 2018 00:03  Mg     2.2     10-05        CARDIAC MARKERS ( 05 Oct 2018 06:33 )  x     / x     / 48 U/L / x     / 1.7 ng/mL    TTE 10/5  EF 49%  1. Mitral annular calcification, otherwise normal mitral  valve. Mild mitral regurgitation.  2. Calcified trileaflet aortic valve with normal opening.  Minimal aortic regurgitation.  3. Moderate concentric left ventricular hypertrophy.  4. Normal left ventricular systolic function. No segmental  wall motion abnormalities.  5. Normal diastolic function  6. The right ventricle is not well visualized; grossly  normal right ventricular systolic function.          Consultant(s) Notes Reviewed:  Endocrinology

## 2018-10-06 NOTE — PROGRESS NOTE ADULT - PROBLEM SELECTOR PLAN 2
- improved off D5 ; FS above 140 over the past 12 hrs  -Seen by Endo ; reccs noted  - will discharge home on Metformin 1000mg bid  - medication and diet compliance encouraged

## 2018-11-11 PROCEDURE — 82553 CREATINE MB FRACTION: CPT

## 2018-11-11 PROCEDURE — 82962 GLUCOSE BLOOD TEST: CPT

## 2018-11-11 PROCEDURE — 80053 COMPREHEN METABOLIC PANEL: CPT

## 2018-11-11 PROCEDURE — 85027 COMPLETE CBC AUTOMATED: CPT

## 2018-11-11 PROCEDURE — 71045 X-RAY EXAM CHEST 1 VIEW: CPT

## 2018-11-11 PROCEDURE — 84484 ASSAY OF TROPONIN QUANT: CPT

## 2018-11-11 PROCEDURE — C1894: CPT

## 2018-11-11 PROCEDURE — 93005 ELECTROCARDIOGRAM TRACING: CPT

## 2018-11-11 PROCEDURE — 82550 ASSAY OF CK (CPK): CPT

## 2018-11-11 PROCEDURE — C1887: CPT

## 2018-11-11 PROCEDURE — 99152 MOD SED SAME PHYS/QHP 5/>YRS: CPT

## 2018-11-11 PROCEDURE — 80048 BASIC METABOLIC PNL TOTAL CA: CPT

## 2018-11-11 PROCEDURE — 93306 TTE W/DOPPLER COMPLETE: CPT

## 2018-11-11 PROCEDURE — 93454 CORONARY ARTERY ANGIO S&I: CPT

## 2018-11-11 PROCEDURE — 83036 HEMOGLOBIN GLYCOSYLATED A1C: CPT

## 2018-11-11 PROCEDURE — C1769: CPT

## 2018-11-11 PROCEDURE — 83735 ASSAY OF MAGNESIUM: CPT

## 2018-11-20 ENCOUNTER — EMERGENCY (EMERGENCY)
Facility: HOSPITAL | Age: 58
LOS: 1 days | Discharge: HOME | End: 2018-11-20
Attending: EMERGENCY MEDICINE
Payer: COMMERCIAL

## 2018-11-20 VITALS
SYSTOLIC BLOOD PRESSURE: 154 MMHG | TEMPERATURE: 98 F | RESPIRATION RATE: 18 BRPM | HEART RATE: 74 BPM | OXYGEN SATURATION: 96 % | DIASTOLIC BLOOD PRESSURE: 70 MMHG

## 2018-11-20 VITALS
SYSTOLIC BLOOD PRESSURE: 167 MMHG | RESPIRATION RATE: 18 BRPM | TEMPERATURE: 98 F | HEIGHT: 72 IN | DIASTOLIC BLOOD PRESSURE: 106 MMHG | HEART RATE: 76 BPM | OXYGEN SATURATION: 96 % | WEIGHT: 281.97 LBS

## 2018-11-20 PROBLEM — Z95.5 PRESENCE OF CORONARY ANGIOPLASTY IMPLANT AND GRAFT: Chronic | Status: ACTIVE | Noted: 2018-10-04

## 2018-11-20 PROBLEM — E66.9 OBESITY, UNSPECIFIED: Chronic | Status: ACTIVE | Noted: 2018-10-04

## 2018-11-20 PROBLEM — F31.9 BIPOLAR DISORDER, UNSPECIFIED: Chronic | Status: ACTIVE | Noted: 2018-10-04

## 2018-11-20 LAB
ALBUMIN SERPL ELPH-MCNC: 4 G/DL — SIGNIFICANT CHANGE UP (ref 3.3–5)
ALP SERPL-CCNC: 95 U/L — SIGNIFICANT CHANGE UP (ref 40–120)
ALT FLD-CCNC: 28 U/L — SIGNIFICANT CHANGE UP (ref 10–45)
ANION GAP SERPL CALC-SCNC: 13 MMOL/L — SIGNIFICANT CHANGE UP (ref 5–17)
APPEARANCE UR: CLEAR — SIGNIFICANT CHANGE UP
AST SERPL-CCNC: 17 U/L — SIGNIFICANT CHANGE UP (ref 10–40)
BACTERIA # UR AUTO: NEGATIVE — SIGNIFICANT CHANGE UP
BASOPHILS # BLD AUTO: 0.1 K/UL — SIGNIFICANT CHANGE UP (ref 0–0.2)
BASOPHILS NFR BLD AUTO: 0.8 % — SIGNIFICANT CHANGE UP (ref 0–2)
BILIRUB SERPL-MCNC: 0.2 MG/DL — SIGNIFICANT CHANGE UP (ref 0.2–1.2)
BILIRUB UR-MCNC: NEGATIVE — SIGNIFICANT CHANGE UP
BUN SERPL-MCNC: 12 MG/DL — SIGNIFICANT CHANGE UP (ref 7–23)
CALCIUM SERPL-MCNC: 9.5 MG/DL — SIGNIFICANT CHANGE UP (ref 8.4–10.5)
CHLORIDE SERPL-SCNC: 100 MMOL/L — SIGNIFICANT CHANGE UP (ref 96–108)
CO2 SERPL-SCNC: 24 MMOL/L — SIGNIFICANT CHANGE UP (ref 22–31)
COLOR SPEC: SIGNIFICANT CHANGE UP
CREAT SERPL-MCNC: 0.74 MG/DL — SIGNIFICANT CHANGE UP (ref 0.5–1.3)
DIFF PNL FLD: NEGATIVE — SIGNIFICANT CHANGE UP
EOSINOPHIL # BLD AUTO: 0.1 K/UL — SIGNIFICANT CHANGE UP (ref 0–0.5)
EOSINOPHIL NFR BLD AUTO: 1.3 % — SIGNIFICANT CHANGE UP (ref 0–6)
EPI CELLS # UR: 0 /HPF — SIGNIFICANT CHANGE UP
GLUCOSE SERPL-MCNC: 384 MG/DL — HIGH (ref 70–99)
GLUCOSE UR QL: ABNORMAL
HCT VFR BLD CALC: 49.1 % — SIGNIFICANT CHANGE UP (ref 39–50)
HGB BLD-MCNC: 16.4 G/DL — SIGNIFICANT CHANGE UP (ref 13–17)
HYALINE CASTS # UR AUTO: 0 /LPF — SIGNIFICANT CHANGE UP (ref 0–2)
KETONES UR-MCNC: NEGATIVE — SIGNIFICANT CHANGE UP
LEUKOCYTE ESTERASE UR-ACNC: NEGATIVE — SIGNIFICANT CHANGE UP
LIDOCAIN IGE QN: 28 U/L — SIGNIFICANT CHANGE UP (ref 7–60)
LYMPHOCYTES # BLD AUTO: 1.7 K/UL — SIGNIFICANT CHANGE UP (ref 1–3.3)
LYMPHOCYTES # BLD AUTO: 20.2 % — SIGNIFICANT CHANGE UP (ref 13–44)
MCHC RBC-ENTMCNC: 31.5 PG — SIGNIFICANT CHANGE UP (ref 27–34)
MCHC RBC-ENTMCNC: 33.4 GM/DL — SIGNIFICANT CHANGE UP (ref 32–36)
MCV RBC AUTO: 94.3 FL — SIGNIFICANT CHANGE UP (ref 80–100)
MONOCYTES # BLD AUTO: 0.4 K/UL — SIGNIFICANT CHANGE UP (ref 0–0.9)
MONOCYTES NFR BLD AUTO: 5.3 % — SIGNIFICANT CHANGE UP (ref 2–14)
NEUTROPHILS # BLD AUTO: 6.1 K/UL — SIGNIFICANT CHANGE UP (ref 1.8–7.4)
NEUTROPHILS NFR BLD AUTO: 72.4 % — SIGNIFICANT CHANGE UP (ref 43–77)
NITRITE UR-MCNC: NEGATIVE — SIGNIFICANT CHANGE UP
PH UR: 5.5 — SIGNIFICANT CHANGE UP (ref 5–8)
PLATELET # BLD AUTO: 173 K/UL — SIGNIFICANT CHANGE UP (ref 150–400)
POTASSIUM SERPL-MCNC: 4 MMOL/L — SIGNIFICANT CHANGE UP (ref 3.5–5.3)
POTASSIUM SERPL-SCNC: 4 MMOL/L — SIGNIFICANT CHANGE UP (ref 3.5–5.3)
PROT SERPL-MCNC: 7.1 G/DL — SIGNIFICANT CHANGE UP (ref 6–8.3)
PROT UR-MCNC: NEGATIVE — SIGNIFICANT CHANGE UP
RBC # BLD: 5.21 M/UL — SIGNIFICANT CHANGE UP (ref 4.2–5.8)
RBC # FLD: 14 % — SIGNIFICANT CHANGE UP (ref 10.3–14.5)
RBC CASTS # UR COMP ASSIST: 1 /HPF — SIGNIFICANT CHANGE UP (ref 0–4)
SODIUM SERPL-SCNC: 137 MMOL/L — SIGNIFICANT CHANGE UP (ref 135–145)
SP GR SPEC: 1.03 — HIGH (ref 1.01–1.02)
UROBILINOGEN FLD QL: NEGATIVE — SIGNIFICANT CHANGE UP
WBC # BLD: 8.4 K/UL — SIGNIFICANT CHANGE UP (ref 3.8–10.5)
WBC # FLD AUTO: 8.4 K/UL — SIGNIFICANT CHANGE UP (ref 3.8–10.5)
WBC UR QL: 0 /HPF — SIGNIFICANT CHANGE UP (ref 0–5)

## 2018-11-20 PROCEDURE — 83690 ASSAY OF LIPASE: CPT

## 2018-11-20 PROCEDURE — 74176 CT ABD & PELVIS W/O CONTRAST: CPT | Mod: 26

## 2018-11-20 PROCEDURE — 81001 URINALYSIS AUTO W/SCOPE: CPT

## 2018-11-20 PROCEDURE — 99284 EMERGENCY DEPT VISIT MOD MDM: CPT | Mod: 25

## 2018-11-20 PROCEDURE — 85027 COMPLETE CBC AUTOMATED: CPT

## 2018-11-20 PROCEDURE — 80053 COMPREHEN METABOLIC PANEL: CPT

## 2018-11-20 PROCEDURE — 93010 ELECTROCARDIOGRAM REPORT: CPT

## 2018-11-20 PROCEDURE — 74176 CT ABD & PELVIS W/O CONTRAST: CPT

## 2018-11-20 PROCEDURE — 93005 ELECTROCARDIOGRAM TRACING: CPT

## 2018-11-20 RX ORDER — OXYCODONE HYDROCHLORIDE 5 MG/1
5 TABLET ORAL ONCE
Qty: 0 | Refills: 0 | Status: DISCONTINUED | OUTPATIENT
Start: 2018-11-20 | End: 2018-11-20

## 2018-11-20 RX ORDER — IBUPROFEN 200 MG
600 TABLET ORAL ONCE
Qty: 0 | Refills: 0 | Status: COMPLETED | OUTPATIENT
Start: 2018-11-20 | End: 2018-11-20

## 2018-11-20 RX ORDER — OXYCODONE HYDROCHLORIDE 5 MG/1
1 TABLET ORAL
Qty: 9 | Refills: 0 | OUTPATIENT
Start: 2018-11-20 | End: 2018-11-22

## 2018-11-20 RX ORDER — ACETAMINOPHEN 500 MG
975 TABLET ORAL ONCE
Qty: 0 | Refills: 0 | Status: COMPLETED | OUTPATIENT
Start: 2018-11-20 | End: 2018-11-20

## 2018-11-20 RX ADMIN — Medication 975 MILLIGRAM(S): at 15:55

## 2018-11-20 RX ADMIN — OXYCODONE HYDROCHLORIDE 5 MILLIGRAM(S): 5 TABLET ORAL at 18:31

## 2018-11-20 RX ADMIN — Medication 975 MILLIGRAM(S): at 16:30

## 2018-11-20 RX ADMIN — Medication 600 MILLIGRAM(S): at 15:30

## 2018-11-20 RX ADMIN — Medication 600 MILLIGRAM(S): at 15:05

## 2018-11-20 RX ADMIN — OXYCODONE HYDROCHLORIDE 5 MILLIGRAM(S): 5 TABLET ORAL at 18:47

## 2018-11-20 NOTE — ED PROVIDER NOTE - NS ED ROS FT
Constitutional: no fevers, chills  HEENT: no visual changes, no sore throat, no rhinorrhea  CV: no cp  Resp: no sob  GI: +abd pain, no n/v/diarrhea/constipation  : no dysuria, hematuria  MSK: no joint pains  skin: no rashes  neuro: no HA, no confusion

## 2018-11-20 NOTE — ED STATDOCS - PMH
Abdominal Obesity    Arthritis    Bipolar disorder    CAD (coronary artery disease)    Congestive heart failure    Diabetes Mellitus Type 2 in Obese    Obesity (BMI 30-39.9)    JONAH treated with BiPAP    Smoker    Stented coronary artery

## 2018-11-20 NOTE — ED PROVIDER NOTE - OBJECTIVE STATEMENT
59yo M h/o CAD s/p stent x1, arthritis, HTN, DM, obesity sent in by PMD Hugo Barreto for r/o cholecystitis. Pt with RUQ pain and flank pain x2 wks, constant, associated with movement. Denies n/v, diarrhea/constipation. Having normal BM, normal PO intake. Was seen by pain control physician who attributed pain to his herniated disk, scheduled for MRI in several days, however pain unbearable so pt went to PMD who told pt to come to ED for US. also endorsing sob with the pain.    PMD: hugo skinner

## 2018-11-20 NOTE — ED ADULT TRIAGE NOTE - CHIEF COMPLAINT QUOTE
RUQ radiating to right back pain for 2 weeks.  Sent by PMD.  Nausea no vomiting, no urinary sx or fevers. RUQ radiating to right back for 2 weeks.  Sent by PMD.  Nausea no vomiting, no urinary sx or fevers.

## 2018-11-20 NOTE — ED PROVIDER NOTE - PROGRESS NOTE DETAILS
Patient having ongoing pain, workup unremarkable, discussed with patient will give Rx for pain medications and have follow up with outpatient pain management physician.  David Nayak M.D.

## 2018-11-20 NOTE — ED STATDOCS - OBJECTIVE STATEMENT
59 y/o M pt with PMHx of DM c/o worsening constant RUQ radiating to the back x2 weeks. Pt was sent in by PMD to r/o cholecystitis. Notes nausea. Denies vomiting, urinary sx, fevers or any other complaints.

## 2018-11-20 NOTE — ED PROVIDER NOTE - ATTENDING CONTRIBUTION TO CARE
Patient presenting c/o 2 weeks of epigastric/RUQ/R flank pain progressively worsening.  Never had similar pains.  No associated nausea, vomiting, normal appetite.  Saw outpatient physician who sent for evaluation.  Taking NSAIDs with minimal relief.    On exam patient well appearing, vital signs within normal limits, RRR S1/S2, lungs clear to ascultation bilaterally, abdomen soft but tenderness to palpation without rebound or guarding from epigastrum wrapping around through RUQ into R flank.    DDx includes biliary disease, renal colic - plan for labs, UA, CT A/P, pain control, re-evaluate.

## 2018-11-20 NOTE — ED PROVIDER NOTE - PHYSICAL EXAMINATION
Vitals: WNL  Gen: laying comfortably in NAD, obese  Head: NCAT  ENT: sclerae white, anicterus, moist mucous membranes. No exudates. Neck supple, no LAD,  no carotid bruits, no JVD  CV: RRR. Audible S1 and S2. No murmurs, rubs, gallops, S3, nor S4, 2+ radial and DP pulses   Pulm: Clear to auscultation bilaterally. No wheezes, rales, or rhonchi  Abd: soft, normoactive BS x4, +RUQ ttp, no rebound, no guarding, no rashes  Musculoskeletal:  No peripheral edema  Skin: no lesions or scars noted  Neurologic: AAOx3  : +R flank pain  Psych: no SI/HI

## 2018-11-20 NOTE — ED ADULT NURSE NOTE - OBJECTIVE STATEMENT
57 y/o Male  c/o worsening constant RUQ  pain radiating to the back x2 weeks.  Pt c/o nausea.   Denies vomiting, urinary sx, fevers or any other complaints. 59 y/o Male  sent in by PMD Vijaya Barreto for r/o cholecystitis.  Pt with RUQ pain and flank pain x2 wks, with movement.   Having normal BM, normal PO intake.  Pt also reports SOB with the pain.  Pt denies n/v/d, fever, chills, constipation.  Respiration easy and non labored.

## 2018-11-20 NOTE — ED PROVIDER NOTE - MEDICAL DECISION MAKING DETAILS
57yo M h/o CAD s/p stent x1, arthritis, HTN, DM, obesity sent in by PMD Vijaya Barreto for r/o cholecystitis. Flank and RUQ pain. CTAP, labs, UA, ekg.

## 2018-11-20 NOTE — ED ADULT NURSE NOTE - NSIMPLEMENTINTERV_GEN_ALL_ED
Implemented All Universal Safety Interventions:  [place] to call system. Call bell, personal items and telephone within reach. Instruct patient to call for assistance. Room bathroom lighting operational. Non-slip footwear when patient is off stretcher. Physically safe environment: no spills, clutter or unnecessary equipment. Stretcher in lowest position, wheels locked, appropriate side rails in place.

## 2018-11-20 NOTE — ED ADULT NURSE NOTE - CHIEF COMPLAINT QUOTE
RUQ radiating to right back for 2 weeks.  Sent by PMD.  Nausea no vomiting, no urinary sx or fevers.

## 2019-01-01 NOTE — CONSULT NOTE ADULT - REASON FOR ADMISSION
mother; B+ blood type; AROM, clear @ 04:18; hx: HSV, on Valtrex; Hypothyroid, on Synthroid
Hypoglycemia

## 2019-11-02 NOTE — PATIENT PROFILE ADULT. - CENTRAL VENOUS CATHETER
Subjective:       Patient ID: Michelle Tian 
no

## 2020-09-29 NOTE — DOWNTIME INTERRUPTION NOTE - WHICH MANUAL FORMS INITIATED?
----- Message from Jono Arredondo sent at 9/29/2020  9:19 AM CDT -----  Regarding: Orders for mammogram  Type:  Mammogram    Caller is requesting to schedule their annual mammogram appointment.  Order is not listed in EPIC.  Please enter order and contact patient to schedule.  Name of Caller: Pt   Where would they like the mammogram performed? [facility]   Would the patient rather a call back or a response via [facility]? Call back   Best Call Back Number: [phone] (home)   Additional Information: n/a      See down time forms

## 2021-12-30 NOTE — H&P ADULT - PROBLEM SELECTOR PROBLEM 6
Bronwyn is calling and requesting a RTW letter with restrictions.  She would like a call back to discuss this with you.   Need for prophylactic measure

## 2022-02-24 NOTE — ED PROVIDER NOTE - NSCAREINITIATED _GEN_ER
Jasmin Russell(Attending)
[id number]. Normal sinus rhythm. Rightward ventricular axis. Atrial and ventricular forces were normal. No ST segment or T-wave abnormality. QTc 432.

## 2022-07-07 NOTE — PATIENT PROFILE ADULT. - FUNCTIONAL SCREEN CURRENT LEVEL: BATHING, MLM
[facility] DIVISION OF KIDNEY DISEASES AND HYPERTENSION -- FOLLOW UP NOTE  --------------------------------------------------------------------------------    HPI: 67 y/o male w/ a PMHx of CAD s/p CABG (2020), AF on Eliquis c/b CVA (2019) c/b seizures, HTN, HLD, DM type II, hypothyroidism, GI bleed due to a duodenal ulcer (2/2022), and ESRD s/p DDRT (4/21/22) c/b DGF requiring HD (last session 4/29/22) & large perinephric hematoma s/p evacuation w/ revision of arterial anastomosis (5/2/22) who presented on 6/10/22 for status epilepticus. Patient was intubated for airway protection. AEDs were adjusted and he was successfully extubated on 6/11. Patient was noted to have a large right pleural effusion so thoracentesis was performed w/ 1.3 L of serosanguineous fluid drained. Patient was started on a heparin infusion for his atrial fibrillation post-procedure but had a H&H drop w/ CXR concerning for a hemothorax. Patient was transferred to SICU for further management. He was intubated for airway protection & was taken to the OR on 6/15 for a right VATS for hematoma washout and placement of a second chest tube. He was transferred to the floors on 6/17. Both chest tubes were removed on 6/18. He went into status epilepticus again on 6/25 so he was readmitted to SICU. He was intubated for airway protection again on 6/26 and successfully extubated on 6/28. Hospital course has also been significant for delirium, dysphagia requiring NGT placement for enteral access, hyperkalemia.  ?PRES on MRI, off Envarsus, switched to Belatacept 6/23. Last dose of Renee given on 7/4      24 hour events/subjective: Patient seen & examined. Labs & vitals reviewed. Pt with episodes of agitation & delirium overnight. Appears lethargic this AM. Coughing up this AM, feeds started yesterday. Back on vEEG. Constipation relieved.  UO in the last 24 hours 860mL. Afebrile. VSS.           PAST HISTORY  --------------------------------------------------------------------------------  No significant changes to PMH, PSH, FHx, SHx, unless otherwise noted    ALLERGIES & MEDICATIONS  --------------------------------------------------------------------------------  Allergies    No Known Allergies    Intolerances      Standing Inpatient Medications  amLODIPine   Tablet 10 milliGRAM(s) Oral <User Schedule>  atorvastatin 40 milliGRAM(s) Oral at bedtime  atovaquone  Suspension 1500 milliGRAM(s) Oral <User Schedule>  chlorhexidine 2% Cloths 1 Application(s) Topical <User Schedule>  doxazosin 4 milliGRAM(s) Oral daily  enoxaparin Injectable 60 milliGRAM(s) SubCutaneous every 12 hours  epoetin cortney-epbx (RETACRIT) Injectable 28566 Unit(s) SubCutaneous every 7 days  finasteride 5 milliGRAM(s) Oral daily  fosphenytoin IVPB 100 milliGRAM(s) PE IV Intermittent every 12 hours  hydrALAZINE 100 milliGRAM(s) Oral every 8 hours  insulin lispro (ADMELOG) corrective regimen sliding scale   SubCutaneous every 6 hours  levothyroxine Injectable 40 MICROGram(s) IV Push at bedtime  melatonin 6 milliGRAM(s) Oral at bedtime  mycophenolate mofetil Suspension 1000 milliGRAM(s) Enteral Tube <User Schedule>  pantoprazole   Suspension 40 milliGRAM(s) Oral daily  polyethylene glycol 3350 17 Gram(s) Oral daily  predniSONE  Solution 5 milliGRAM(s) Oral daily  sodium zirconium cyclosilicate 10 Gram(s) Oral daily  valGANciclovir 50 mG/mL Oral Solution 450 milliGRAM(s) Oral daily    PRN Inpatient Medications  acetaminophen    Suspension .. 650 milliGRAM(s) Enteral Tube every 6 hours PRN      REVIEW OF SYSTEMS  --------------------------------------------------------------------------------  unable to obtain      VITALS/PHYSICAL EXAM  --------------------------------------------------------------------------------  T(C): 36.1 (07-07-22 @ 11:00), Max: 36.6 (07-06-22 @ 15:00)  HR: 53 (07-07-22 @ 11:00) (50 - 62)  BP: 136/62 (07-07-22 @ 11:00) (131/61 - 169/74)  RR: 14 (07-07-22 @ 11:00) (12 - 25)  SpO2: 99% (07-07-22 @ 11:00) (94% - 100%)  Wt(kg): --        07-06-22 @ 07:01  -  07-07-22 @ 07:00  --------------------------------------------------------  IN: 1600 mL / OUT: 970 mL / NET: 630 mL    07-07-22 @ 07:01  -  07-07-22 @ 11:40  --------------------------------------------------------  IN: 220 mL / OUT: 105 mL / NET: 115 mL      Physical Exam:  	Gen: NAD  	HEENT: Anicteric, no JVD  	Pulm: CTA B/L  	CV: RRR, S1S2  	Abd: +BS, soft, nontender/nondistended          Extremities: no bilateral LE edema, UE edema b/l          Neuro: lethargic, barely arousable  	Skin: Warm, without rashes  	Vascular access:                Miranda with clear urine          Transplant: No tenderness, swelling        LABS/STUDIES  --------------------------------------------------------------------------------              7.9    7.91  >-----------<  276      [07-07-22 @ 06:22]              26.0     139  |  104  |  24  ----------------------------<  203      [07-07-22 @ 06:17]  4.4   |  26  |  1.55        Ca     8.2     [07-07-22 @ 06:17]      Mg     2.2     [07-07-22 @ 06:17]      Phos  3.7     [07-07-22 @ 06:17]    TPro  5.4  /  Alb  2.3  /  TBili  0.2  /  DBili  <0.1  /  AST  13  /  ALT  12  /  AlkPhos  138  [07-07-22 @ 06:17]    PT/INR: PT 16.5 , INR 1.43       [07-07-22 @ 06:24]  PTT: 45.1       [07-07-22 @ 06:24]      Creatinine Trend:  SCr 1.55 [07-07 @ 06:17]  SCr 1.53 [07-06 @ 22:55]  SCr 1.61 [07-06 @ 15:36]  SCr 1.71 [07-06 @ 05:37]  SCr 1.69 [07-06 @ 00:59]              Urinalysis - [07-03-22 @ 14:32]      Color Light Yellow / Appearance Clear / SG 1.018 / pH 8.0      Gluc Negative / Ketone Negative  / Bili Negative / Urobili Negative       Blood Negative / Protein Trace / Leuk Est Negative / Nitrite Negative      RBC 1 / WBC 2 / Hyaline 0 / Gran  / Sq Epi  / Non Sq Epi 1 / Bacteria Negative      Iron 17, TIBC 171, %sat 10      [05-02-22 @ 13:03]  Ferritin 1505      [05-02-22 @ 13:05]  PTH -- (Ca 9.2)      [02-05-22 @ 10:13]   294  HbA1c 6.0      [02-21-20 @ 08:53]  TSH 4.69      [07-06-22 @ 18:36]      Syphilis Screen (Treponema Pallidum Ab) Negative      [06-27-22 @ 02:00]     (0) independent

## 2023-09-15 NOTE — ED ADULT TRIAGE NOTE - AS O2 DELIVERY
room air Taltz Pregnancy And Lactation Text: The risk during pregnancy and breastfeeding is uncertain with this medication.

## 2023-10-03 NOTE — CONSULT NOTE ADULT - PROBLEM SELECTOR PROBLEM 3
10/3/2023         RE: Chintan Harley  [id number] [place] [address]        Dear Colleague,    Thank you for referring your patient, Chintan Harley, to the [facility]. Please see a copy of my visit note below.    GI CLINIC VISIT    CC/REFERRING MD:  Bridget Pollard  REASON FOR CONSULTATION: Epigastric Pain    ASSESSMENT/PLAN:    25 y/o  presents for evaluation of reflux symptoms.     #epigastric pain  #Soft stools  Patient developed upper abdominal pain about 10 days ago, after possibly eating a moldy bagel. He was evaluated at  about 5 days ago and was started on omeprazole 20mg daily and carafate with meals -- he has noticed improvement with this. Symptoms may be secondary to gastritis, PUD, GERD, also considering biliary etiology. Will plan to obtain H.Pylori testing, but did notify patient that he will need to stop omeprazole for two weeks prior to submitting the sample. Once he has submitted the sample, he can start taking omeprazole 40mg daily. In the mean time he will take carafate and pepcid PRN. He does endorse loose stools that occurred for a week, but now they are more so described as a bristol type VII. Prior to his abdominal pain starting stools were described as a bristol type III. Discussed with patient that he should start a fiber supplementation, if soft stools are persistent would plan to obtain further labs and stool studies.   --submit H.Pylori testing  --once testing is submitted, start omeprazole 40mg daily.  --can take carafate and pepcid PRN  --start a soluble fiber.  --future considerations: if persistent soft stools, would obtain labs and stool studies. In regards to epigastric pain, could consider EGD if no improvement in epigastric pain.        RTC 3 months    Thank you for this consultation.  It was a pleasure to participate in the care of this patient; please contact us with any further questions.       31 minutes spent on the date of  the encounter doing chart review, review of outside records, patient visit, and documentation    This note was created with voice recognition software, and while reviewed for accuracy, typos may remain.    Flavia Martínez PA-C  Division of Gastroenterology, Hepatology and Nutrition  [facility]  25 y/o  presents for evaluation of reflux symptoms.    Patient reports he developed a burning sensation in his upper abdomen which began about 10 days ago. He was seen at  about 5 days ago, and was started on omeprazole 20mg daily, and carafate (which he takes 1 hour before lunch, then 3 hours after lunch, and then takes it at night) and has noticed improvement in symptoms. He notes that pain can be present constantly, does not feel that symptoms are not worse with eating. He notes that initially symptoms began after eating a bagel (which he wonders may have been moldy). Denies nausea or vomiting.  In terms of improvement, he notes pain is no longer a burning sensation, which he describes as a cramp, which is generally present constantly. No hx of gallbladder issues.     In regards to his bowel movements, he will generally have a bowel movement 1-2x/day, described as a bristol type III, denies pushing/straining, denies BRBPR. Since abdominal pain started, patient was having liquid stools, usually 3x/d. Since the  visit, he has only had one liquid stool, at times can be soft, other times can be solid. Patient started a liquid diet for two days after visiting in .     In the beginning of September, patient tried to cut out gluten, no caffeine, no sugar, cut out beef, and pork as well.     Denies NSAIDS. He takes tylenol 500mg BID for his abdominal pain, he started this 5 days ago. Denies use of OTC herbal supplements/weight loss products.      Denies use of ETOH (he has not drank alcohol in the last month, he was binge drinking on the weekends - did this for a couple of  months) and tobacco products. No recreational drug use.     Uncle may have passed away from colon cancer.  Denies family history of IBD/celiac disease.     ROS:    No fevers or chills  No weight loss  No shortness of breath or wheezing  No chest pain or pressure  No odynophagia or dysphagia  No BRBPR, hematochezia, melena  No anxiety or depression    PROBLEM LIST  There are no problems to display for this patient.      PERTINENT PAST MEDICAL HISTORY:    No past medical history on file.    PREVIOUS SURGERIES:    No past surgical history on file.    PREVIOUS ENDOSCOPY:      ALLERGIES:   No Known Allergies    PERTINENT MEDICATIONS:    Current Outpatient Medications:      acetaminophen (TYLENOL) 500 MG tablet, Take 500-1,000 mg by mouth every 6 hours as needed for mild pain, Disp: , Rfl:      calcipotriene (DOVONOX) 0.005 % external solution, Apply 10-15 drops once daily at nighttime before bed (Patient not taking: Reported on 9/27/2023), Disp: 60 mL, Rfl: 11     calcium carbonate (TUMS) 500 MG chewable tablet, Take 1 chew tab by mouth 2 times daily, Disp: , Rfl:      ketoconazole (NIZORAL) 2 % external shampoo, Lather onto scalp in shower at least three times weekly. Let sit for 2 minutes before rinsing. (Patient not taking: Reported on 9/27/2023), Disp: 120 mL, Rfl: 11     omeprazole (PRILOSEC) 20 MG DR capsule, Take 1 capsule (20 mg) by mouth daily for 30 days, Disp: 30 capsule, Rfl: 0     sucralfate (CARAFATE) 1 GM tablet, Take 1 tablet (1 g) by mouth 4 times daily for 10 days, Disp: 40 tablet, Rfl: 0    SOCIAL HISTORY:    Social History     Socioeconomic History     Marital status:      Spouse name: Not on file     Number of children: Not on file     Years of education: Not on file     Highest education level: Not on file   Occupational History     Not on file   Tobacco Use     Smoking status: Never     Smokeless tobacco: Never   Substance and Sexual Activity     Alcohol use: Yes     Alcohol/week: 1.0  "standard drink of alcohol     Types: 1 Cans of beer per week     Drug use: No     Sexual activity: Never   Other Topics Concern     Not on file   Social History Narrative     Not on file     Social Determinants of Health     Financial Resource Strain: Not on file   Food Insecurity: Not on file   Transportation Needs: Not on file   Physical Activity: Not on file   Stress: Not on file   Social Connections: Not on file   Interpersonal Safety: Not on file   Housing Stability: Not on file       FAMILY HISTORY:  FH of CRC: Uncle had colon cancer  FH of IBD: None.  Family History   Problem Relation Age of Onset     Diabetes Maternal Grandmother      Asthma No family hx of      C.A.D. No family hx of      Cardiovascular No family hx of      Hypertension No family hx of      Cerebrovascular Disease No family hx of      Breast Cancer No family hx of      Cancer - colorectal No family hx of      Prostate Cancer No family hx of        Past/family/social history reviewed and no changes    PHYSICAL EXAMINATION:  Constitutional: aaox3, cooperative, pleasant, not dyspneic/diaphoretic, no acute distress  Vitals reviewed: /74 (BP Location: Left arm, Patient Position: Sitting, Cuff Size: Adult Regular)   Pulse 79   Ht 1.721 m (5' 7.75\")   Wt 65.8 kg (145 lb)   SpO2 99%   BMI 22.21 kg/m    Wt:   Wt Readings from Last 2 Encounters:   09/27/23 67.1 kg (148 lb)   03/02/23 70.7 kg (155 lb 12.8 oz)      Eyes: Sclera anicteric/injected  Respiratory: Unlabored breathing  Skin: warm, perfused, no jaundice  Psych: Normal affect  MSK: Normal gait        Again, thank you for allowing me to participate in the care of your patient.        Sincerely,        Flavia Martínez PA-C  " Abdominal Obesity

## 2024-05-06 NOTE — DISCHARGE NOTE ADULT - PRINCIPAL DIAGNOSIS
Detail Level: Detailed Add 51069 Cpt? (Important Note: In 2017 The Use Of 82923 Is Being Tracked By Cms To Determine Future Global Period Reimbursement For Global Periods): yes S/P cardiac catheterization